# Patient Record
Sex: FEMALE | Race: WHITE | Employment: FULL TIME | URBAN - METROPOLITAN AREA
[De-identification: names, ages, dates, MRNs, and addresses within clinical notes are randomized per-mention and may not be internally consistent; named-entity substitution may affect disease eponyms.]

---

## 2017-04-12 LAB
GLUCOSE CHALLENGE: 77 MG/DL
HCT VFR BLD CALC: 38.5 % (ref 36–48)
HEMOGLOBIN: 12.8 G/DL (ref 12–16)
MCH RBC QN AUTO: 30.7 PG (ref 26–34)
MCHC RBC AUTO-ENTMCNC: 33.3 G/DL (ref 31–36)
MCV RBC AUTO: 92.2 FL (ref 80–100)
PDW BLD-RTO: 13.9 % (ref 12.4–15.4)
PLATELET # BLD: 204 K/UL (ref 135–450)
PMV BLD AUTO: 8.4 FL (ref 5–10.5)
RBC # BLD: 4.18 M/UL (ref 4–5.2)
WBC # BLD: 8.7 K/UL (ref 4–11)

## 2018-03-14 ENCOUNTER — OFFICE VISIT (OUTPATIENT)
Dept: DERMATOLOGY | Age: 43
End: 2018-03-14

## 2018-03-14 DIAGNOSIS — L30.9 DERMATITIS: Primary | ICD-10-CM

## 2018-03-14 PROCEDURE — 99202 OFFICE O/P NEW SF 15 MIN: CPT | Performed by: DERMATOLOGY

## 2018-03-14 RX ORDER — FLUOCINONIDE 1 MG/G
CREAM TOPICAL
Qty: 60 G | Refills: 0 | Status: SHIPPED | OUTPATIENT
Start: 2018-03-14 | End: 2019-11-15 | Stop reason: ALTCHOICE

## 2018-03-14 NOTE — PATIENT INSTRUCTIONS
DRY SKIN CARE    1. Do not take more than 1 shower or bath each day. Try to spend 10 minutes or less in the shower/bath. 2. Use a moisturizing soap such as Dove, Oil of Olay or Cetaphil. Antibacterial soaps can be too drying. 3. Use soap only on limited areas (face, underarms, groin). Try to avoid using soap on the arms, legs, trunk and back. 4. After showering, pat dry with a towel and generously apply a thick moisturizer all over. 5. If you are able, apply the moisturizer a second time during the day as well. 6. If a prescription cream or ointment has been ordered for you, apply the prescription medication to affected areas after your bath/shower while the skin is still damp, then apply the moisturizer as above. 7. When it comes to moisturizers, the thicker the better. Ointments (such as vaseline) are thicker than creams, and creams are thicker than lotions.  Suggested over-the-counter moisturizers include:    · CeraVe Cream  · Cetaphil Cream  · Lubriderm Cream  · Vaseline (petroleum jelly)  · Aquaphor  · Eucerin Cream  · Neutrogena Moisturizing Cream  · Neutrogena Hand Cream  · Aveeno Moisturizing Cream or Lotion  · Curel Cream

## 2018-03-14 NOTE — PROGRESS NOTES
[facility]) Dermatology  Sammy Cheatham Bita Bernal Luis  1975    38 y.o. female     Date of Visit: 3/14/2018    Chief Complaint / Reason for Referral: Rash     I was asked to see this patient by  No ref. provider found. History of Present Illness:  1. 9mo hx persistent mildly pruritic eruption of neck and extremities. No improvement w/ HC 1% crm, Cerave lotion.   -Showers daily w/ Dove all over. Intermittently moisturizes. -(+) personal hx allergic rhiniti s    Review of Systems:  Constitutional: Reports general sense of well-being. Heme: Denies abnormal bleeding/bruising. Past Medical History, Surgical History, Family History, Medications and Allergies reviewed. 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       Physical Examination     The following were examined and determined to be normal: Psych/Neuro, Head/face and Conjunctivae/eyelids. The following were examined and determined to be abnormal: Neck, RUE, LUE, RLE and LLE. -General: Well-appearing, NAD  1. Lateral neck, upper arms, anterior lower legs - ill-defined mildly finely scaling pink papules and plaques   -Arms and legs - severely xerotic     Assessment and Plan     1. Pruritic eruption of neck and extremities.  Morphology of eruption is consistent w/ dermatitis,

## 2018-05-11 ENCOUNTER — HOSPITAL ENCOUNTER (OUTPATIENT)
Dept: OTHER | Age: 43
Discharge: HOME | End: 2018-05-11
Attending: INTERNAL MEDICINE | Admitting: INTERNAL MEDICINE

## 2018-05-11 LAB
BASOPHILS ABSOLUTE: 0 K/UL (ref 0–0.2)
BASOPHILS RELATIVE PERCENT: 0.9 %
CHOLESTEROL, TOTAL: 167 MG/DL (ref 0–199)
EOSINOPHILS ABSOLUTE: 0.1 K/UL (ref 0–0.6)
EOSINOPHILS RELATIVE PERCENT: 3 %
HCT VFR BLD CALC: 40 % (ref 36–48)
HDLC SERPL-MCNC: 87 MG/DL (ref 40–60)
HEMOGLOBIN: 13.7 G/DL (ref 12–16)
LDL CHOLESTEROL CALCULATED: 71 MG/DL
LYMPHOCYTES ABSOLUTE: 1.7 K/UL (ref 1–5.1)
LYMPHOCYTES RELATIVE PERCENT: 33.1 %
MCH RBC QN AUTO: 30.1 PG (ref 26–34)
MCHC RBC AUTO-ENTMCNC: 34.2 G/DL (ref 31–36)
MCV RBC AUTO: 88.1 FL (ref 80–100)
MONOCYTES ABSOLUTE: 0.5 K/UL (ref 0–1.3)
MONOCYTES RELATIVE PERCENT: 10.2 %
NEUTROPHILS ABSOLUTE: 2.6 K/UL (ref 1.7–7.7)
NEUTROPHILS RELATIVE PERCENT: 52.8 %
PDW BLD-RTO: 13.2 % (ref 12.4–15.4)
PLATELET # BLD: 228 K/UL (ref 135–450)
PMV BLD AUTO: 8.5 FL (ref 5–10.5)
RBC # BLD: 4.54 M/UL (ref 4–5.2)
RHEUMATOID FACTOR: <10 IU/ML
SEDIMENTATION RATE, ERYTHROCYTE: 8 MM/HR (ref 0–20)
TRIGL SERPL-MCNC: 47 MG/DL (ref 0–150)
TSH SERPL DL<=0.05 MIU/L-ACNC: 1.01 UIU/ML (ref 0.27–4.2)
VITAMIN D 25-HYDROXY: 26 NG/ML
VLDLC SERPL CALC-MCNC: 9 MG/DL
WBC # BLD: 5 K/UL (ref 4–11)

## 2018-05-13 LAB
ANA INTERPRETATION: NORMAL
ANTI-NUCLEAR ANTIBODY (ANA): NEGATIVE

## 2018-05-15 LAB
A/G RATIO: 1.7 (ref 1.1–2.2)
ALBUMIN SERPL-MCNC: 4.6 G/DL (ref 3.4–5)
ALP BLD-CCNC: 67 U/L (ref 40–129)
ALT SERPL-CCNC: 15 U/L (ref 10–40)
ANION GAP SERPL CALCULATED.3IONS-SCNC: 23 MMOL/L (ref 3–16)
AST SERPL-CCNC: 20 U/L (ref 15–37)
BILIRUB SERPL-MCNC: 0.3 MG/DL (ref 0–1)
BUN BLDV-MCNC: 17 MG/DL (ref 7–20)
CALCIUM SERPL-MCNC: 9.1 MG/DL (ref 8.3–10.6)
CHLORIDE BLD-SCNC: 102 MMOL/L (ref 99–110)
CO2: 19 MMOL/L (ref 21–32)
CREAT SERPL-MCNC: 0.5 MG/DL (ref 0.6–1.1)
GFR AFRICAN AMERICAN: >60
GFR NON-AFRICAN AMERICAN: >60
GLOBULIN: 2.7 G/DL
GLUCOSE BLD-MCNC: 90 MG/DL (ref 70–99)
POTASSIUM SERPL-SCNC: 4.4 MMOL/L (ref 3.5–5.1)
SODIUM BLD-SCNC: 144 MMOL/L (ref 136–145)
TOTAL PROTEIN: 7.3 G/DL (ref 6.4–8.2)

## 2018-09-17 LAB
HPV COMMENT: NORMAL
HPV TYPE 16: NOT DETECTED
HPV TYPE 18: NOT DETECTED
HPVOH (OTHER TYPES): NOT DETECTED

## 2019-05-02 ENCOUNTER — HOSPITAL ENCOUNTER (OUTPATIENT)
Age: 44
Discharge: HOME | End: 2019-05-02
Payer: COMMERCIAL

## 2019-05-02 LAB
A/G RATIO: 1.5 (ref 1.1–2.2)
ALBUMIN SERPL-MCNC: 4.8 G/DL (ref 3.4–5)
ALP BLD-CCNC: 50 U/L (ref 40–129)
ALT SERPL-CCNC: 12 U/L (ref 10–40)
ANION GAP SERPL CALCULATED.3IONS-SCNC: 13 MMOL/L (ref 3–16)
AST SERPL-CCNC: 16 U/L (ref 15–37)
BASOPHILS ABSOLUTE: 0 K/UL (ref 0–0.2)
BASOPHILS RELATIVE PERCENT: 0.5 %
BILIRUB SERPL-MCNC: 0.8 MG/DL (ref 0–1)
BUN BLDV-MCNC: 13 MG/DL (ref 7–20)
CALCIUM SERPL-MCNC: 9.4 MG/DL (ref 8.3–10.6)
CHLORIDE BLD-SCNC: 102 MMOL/L (ref 99–110)
CHOLESTEROL, TOTAL: 165 MG/DL (ref 0–199)
CO2: 25 MMOL/L (ref 21–32)
CREAT SERPL-MCNC: 0.5 MG/DL (ref 0.6–1.1)
EOSINOPHILS ABSOLUTE: 0.1 K/UL (ref 0–0.6)
EOSINOPHILS RELATIVE PERCENT: 1.3 %
FOLATE: 13.29 NG/ML (ref 4.78–24.2)
GFR AFRICAN AMERICAN: >60
GFR NON-AFRICAN AMERICAN: >60
GLOBULIN: 3.1 G/DL
GLUCOSE BLD-MCNC: 93 MG/DL (ref 70–99)
HCT VFR BLD CALC: 43.3 % (ref 36–48)
HDLC SERPL-MCNC: 74 MG/DL (ref 40–60)
HEMOGLOBIN: 14.6 G/DL (ref 12–16)
LDL CHOLESTEROL CALCULATED: 79 MG/DL
LYMPHOCYTES ABSOLUTE: 1.3 K/UL (ref 1–5.1)
LYMPHOCYTES RELATIVE PERCENT: 25.3 %
MCH RBC QN AUTO: 29.5 PG (ref 26–34)
MCHC RBC AUTO-ENTMCNC: 33.8 G/DL (ref 31–36)
MCV RBC AUTO: 87.4 FL (ref 80–100)
MONOCYTES ABSOLUTE: 0.4 K/UL (ref 0–1.3)
MONOCYTES RELATIVE PERCENT: 7.9 %
NEUTROPHILS ABSOLUTE: 3.4 K/UL (ref 1.7–7.7)
NEUTROPHILS RELATIVE PERCENT: 65 %
PDW BLD-RTO: 12.9 % (ref 12.4–15.4)
PLATELET # BLD: 274 K/UL (ref 135–450)
PMV BLD AUTO: 8.6 FL (ref 5–10.5)
POTASSIUM SERPL-SCNC: 4.4 MMOL/L (ref 3.5–5.1)
RBC # BLD: 4.96 M/UL (ref 4–5.2)
SODIUM BLD-SCNC: 140 MMOL/L (ref 136–145)
T3 FREE: 2.8 PG/ML (ref 2.3–4.2)
T4 FREE: 1.3 NG/DL (ref 0.9–1.8)
TOTAL PROTEIN: 7.9 G/DL (ref 6.4–8.2)
TRIGL SERPL-MCNC: 62 MG/DL (ref 0–150)
TSH SERPL DL<=0.05 MIU/L-ACNC: 1.61 UIU/ML (ref 0.27–4.2)
VITAMIN B-12: 452 PG/ML (ref 211–911)
VITAMIN D 25-HYDROXY: 36.8 NG/ML
VLDLC SERPL CALC-MCNC: 12 MG/DL
WBC # BLD: 5.3 K/UL (ref 4–11)

## 2019-05-02 PROCEDURE — 80061 LIPID PANEL: CPT

## 2019-05-02 PROCEDURE — 84439 ASSAY OF FREE THYROXINE: CPT

## 2019-05-02 PROCEDURE — 84443 ASSAY THYROID STIM HORMONE: CPT

## 2019-05-02 PROCEDURE — 36415 COLL VENOUS BLD VENIPUNCTURE: CPT

## 2019-05-02 PROCEDURE — 82746 ASSAY OF FOLIC ACID SERUM: CPT

## 2019-05-02 PROCEDURE — 82607 VITAMIN B-12: CPT

## 2019-05-02 PROCEDURE — 84481 FREE ASSAY (FT-3): CPT

## 2019-05-02 PROCEDURE — 82306 VITAMIN D 25 HYDROXY: CPT

## 2019-05-02 PROCEDURE — 80053 COMPREHEN METABOLIC PANEL: CPT

## 2019-05-02 PROCEDURE — 85025 COMPLETE CBC W/AUTO DIFF WBC: CPT

## 2019-10-01 ENCOUNTER — HOSPITAL ENCOUNTER (OUTPATIENT)
Dept: CT IMAGING | Age: 44
Discharge: HOME | End: 2019-10-01
Payer: COMMERCIAL

## 2019-10-01 DIAGNOSIS — R10.32 LEFT LOWER QUADRANT PAIN: ICD-10-CM

## 2019-10-01 PROCEDURE — 74177 CT ABD & PELVIS W/CONTRAST: CPT

## 2019-10-01 PROCEDURE — 6360000004 HC RX CONTRAST MEDICATION: Performed by: INTERNAL MEDICINE

## 2019-10-01 RX ADMIN — IOPAMIDOL 75 ML: 755 INJECTION, SOLUTION INTRAVENOUS at 12:08

## 2019-10-01 RX ADMIN — IOHEXOL 50 ML: 240 INJECTION, SOLUTION INTRATHECAL; INTRAVASCULAR; INTRAVENOUS; ORAL at 12:07

## 2019-10-04 ENCOUNTER — HOSPITAL ENCOUNTER (OUTPATIENT)
Dept: WOMENS IMAGING | Age: 44
Discharge: HOME | End: 2019-10-04
Payer: COMMERCIAL

## 2019-10-04 DIAGNOSIS — Z12.31 VISIT FOR SCREENING MAMMOGRAM: ICD-10-CM

## 2019-10-04 PROCEDURE — 77067 SCR MAMMO BI INCL CAD: CPT

## 2019-11-15 ENCOUNTER — ANESTHESIA EVENT (OUTPATIENT)
Dept: ENDOSCOPY | Age: 44
End: 2019-11-15
Payer: COMMERCIAL

## 2019-11-15 RX ORDER — OMEPRAZOLE 20 MG/1
20 CAPSULE, DELAYED RELEASE ORAL DAILY
COMMUNITY

## 2019-11-26 ENCOUNTER — HOSPITAL ENCOUNTER (OUTPATIENT)
Age: 44
Setting detail: OUTPATIENT SURGERY
Discharge: HOME | End: 2019-11-26
Attending: INTERNAL MEDICINE | Admitting: INTERNAL MEDICINE
Payer: COMMERCIAL

## 2019-11-26 ENCOUNTER — ANESTHESIA (OUTPATIENT)
Dept: ENDOSCOPY | Age: 44
End: 2019-11-26
Payer: COMMERCIAL

## 2019-11-26 VITALS
WEIGHT: 145 LBS | OXYGEN SATURATION: 100 % | TEMPERATURE: 97.5 F | BODY MASS INDEX: 25.69 KG/M2 | DIASTOLIC BLOOD PRESSURE: 67 MMHG | HEIGHT: 63 IN | RESPIRATION RATE: 16 BRPM | SYSTOLIC BLOOD PRESSURE: 113 MMHG | HEART RATE: 64 BPM

## 2019-11-26 VITALS
RESPIRATION RATE: 19 BRPM | DIASTOLIC BLOOD PRESSURE: 63 MMHG | OXYGEN SATURATION: 100 % | SYSTOLIC BLOOD PRESSURE: 102 MMHG

## 2019-11-26 LAB — HCG(URINE) PREGNANCY TEST: NEGATIVE

## 2019-11-26 PROCEDURE — 88305 TISSUE EXAM BY PATHOLOGIST: CPT

## 2019-11-26 PROCEDURE — 6370000000 HC RX 637 (ALT 250 FOR IP): Performed by: INTERNAL MEDICINE

## 2019-11-26 PROCEDURE — 3609012400 HC EGD TRANSORAL BIOPSY SINGLE/MULTIPLE: Performed by: INTERNAL MEDICINE

## 2019-11-26 PROCEDURE — 2709999900 HC NON-CHARGEABLE SUPPLY: Performed by: INTERNAL MEDICINE

## 2019-11-26 PROCEDURE — 7100000010 HC PHASE II RECOVERY - FIRST 15 MIN: Performed by: INTERNAL MEDICINE

## 2019-11-26 PROCEDURE — 3700000001 HC ADD 15 MINUTES (ANESTHESIA): Performed by: INTERNAL MEDICINE

## 2019-11-26 PROCEDURE — 7100000011 HC PHASE II RECOVERY - ADDTL 15 MIN: Performed by: INTERNAL MEDICINE

## 2019-11-26 PROCEDURE — 84703 CHORIONIC GONADOTROPIN ASSAY: CPT

## 2019-11-26 PROCEDURE — 3700000000 HC ANESTHESIA ATTENDED CARE: Performed by: INTERNAL MEDICINE

## 2019-11-26 PROCEDURE — 6360000002 HC RX W HCPCS: Performed by: NURSE ANESTHETIST, CERTIFIED REGISTERED

## 2019-11-26 PROCEDURE — 2500000003 HC RX 250 WO HCPCS: Performed by: NURSE ANESTHETIST, CERTIFIED REGISTERED

## 2019-11-26 PROCEDURE — 2580000003 HC RX 258: Performed by: ANESTHESIOLOGY

## 2019-11-26 PROCEDURE — 3609010600 HC COLONOSCOPY POLYPECTOMY SNARE/COLD BIOPSY: Performed by: INTERNAL MEDICINE

## 2019-11-26 RX ORDER — SODIUM CHLORIDE 9 MG/ML
INJECTION, SOLUTION INTRAVENOUS CONTINUOUS
Status: DISCONTINUED | OUTPATIENT
Start: 2019-11-26 | End: 2019-11-26 | Stop reason: HOSPADM

## 2019-11-26 RX ORDER — SODIUM CHLORIDE 0.9 % (FLUSH) 0.9 %
10 SYRINGE (ML) INJECTION PRN
Status: DISCONTINUED | OUTPATIENT
Start: 2019-11-26 | End: 2019-11-26 | Stop reason: HOSPADM

## 2019-11-26 RX ORDER — PROPOFOL 10 MG/ML
INJECTION, EMULSION INTRAVENOUS PRN
Status: DISCONTINUED | OUTPATIENT
Start: 2019-11-26 | End: 2019-11-26 | Stop reason: SDUPTHER

## 2019-11-26 RX ORDER — GLYCOPYRROLATE 0.2 MG/ML
INJECTION INTRAMUSCULAR; INTRAVENOUS PRN
Status: DISCONTINUED | OUTPATIENT
Start: 2019-11-26 | End: 2019-11-26 | Stop reason: SDUPTHER

## 2019-11-26 RX ORDER — LIDOCAINE HYDROCHLORIDE 20 MG/ML
INJECTION, SOLUTION INFILTRATION; PERINEURAL PRN
Status: DISCONTINUED | OUTPATIENT
Start: 2019-11-26 | End: 2019-11-26 | Stop reason: SDUPTHER

## 2019-11-26 RX ORDER — SODIUM CHLORIDE 0.9 % (FLUSH) 0.9 %
10 SYRINGE (ML) INJECTION EVERY 12 HOURS SCHEDULED
Status: DISCONTINUED | OUTPATIENT
Start: 2019-11-26 | End: 2019-11-26 | Stop reason: HOSPADM

## 2019-11-26 RX ADMIN — GLYCOPYRROLATE 0.2 MG: 0.2 INJECTION, SOLUTION INTRAMUSCULAR; INTRAVENOUS at 10:20

## 2019-11-26 RX ADMIN — PROPOFOL 100 MG: 10 INJECTION, EMULSION INTRAVENOUS at 10:24

## 2019-11-26 RX ADMIN — PROPOFOL 100 MG: 10 INJECTION, EMULSION INTRAVENOUS at 10:20

## 2019-11-26 RX ADMIN — PROPOFOL 30 MG: 10 INJECTION, EMULSION INTRAVENOUS at 10:38

## 2019-11-26 RX ADMIN — LIDOCAINE HYDROCHLORIDE 100 MG: 20 INJECTION, SOLUTION INFILTRATION; PERINEURAL at 10:20

## 2019-11-26 RX ADMIN — PROPOFOL 100 MG: 10 INJECTION, EMULSION INTRAVENOUS at 10:29

## 2019-11-26 RX ADMIN — SODIUM CHLORIDE: 9 INJECTION, SOLUTION INTRAVENOUS at 10:04

## 2019-11-26 RX ADMIN — PROPOFOL 30 MG: 10 INJECTION, EMULSION INTRAVENOUS at 10:43

## 2019-11-26 ASSESSMENT — PAIN SCALES - GENERAL
PAINLEVEL_OUTOF10: 0

## 2019-11-26 ASSESSMENT — ENCOUNTER SYMPTOMS: SHORTNESS OF BREATH: 0

## 2019-11-26 ASSESSMENT — PAIN - FUNCTIONAL ASSESSMENT: PAIN_FUNCTIONAL_ASSESSMENT: 0-10

## 2020-05-01 ENCOUNTER — OFFICE VISIT (OUTPATIENT)
Dept: ORTHOPEDIC SURGERY | Age: 45
End: 2020-05-01
Payer: COMMERCIAL

## 2020-05-01 VITALS — WEIGHT: 144 LBS | BODY MASS INDEX: 25.52 KG/M2 | HEIGHT: 63 IN | TEMPERATURE: 99.1 F

## 2020-05-01 PROCEDURE — 99202 OFFICE O/P NEW SF 15 MIN: CPT | Performed by: ORTHOPAEDIC SURGERY

## 2020-05-07 ENCOUNTER — HOSPITAL ENCOUNTER (OUTPATIENT)
Dept: MRI IMAGING | Age: 45
Discharge: HOME | End: 2020-05-07
Payer: COMMERCIAL

## 2020-05-07 PROCEDURE — 73721 MRI JNT OF LWR EXTRE W/O DYE: CPT

## 2020-05-08 ENCOUNTER — TELEPHONE (OUTPATIENT)
Dept: ORTHOPEDIC SURGERY | Age: 45
End: 2020-05-08

## 2020-05-08 ENCOUNTER — HOSPITAL ENCOUNTER (EMERGENCY)
Age: 45
Discharge: HOME | End: 2020-05-08
Payer: COMMERCIAL

## 2020-05-08 ENCOUNTER — NURSE TRIAGE (OUTPATIENT)
Dept: OTHER | Facility: CLINIC | Age: 45
End: 2020-05-08

## 2020-05-08 VITALS
TEMPERATURE: 98.2 F | HEART RATE: 69 BPM | HEIGHT: 63 IN | BODY MASS INDEX: 25.69 KG/M2 | WEIGHT: 145 LBS | DIASTOLIC BLOOD PRESSURE: 77 MMHG | SYSTOLIC BLOOD PRESSURE: 121 MMHG | RESPIRATION RATE: 19 BRPM | OXYGEN SATURATION: 97 %

## 2020-05-08 PROCEDURE — 99282 EMERGENCY DEPT VISIT SF MDM: CPT

## 2020-05-08 RX ORDER — IBUPROFEN 600 MG/1
600 TABLET ORAL EVERY 6 HOURS PRN
Qty: 30 TABLET | Refills: 0 | Status: ON HOLD | OUTPATIENT
Start: 2020-05-08 | End: 2020-06-04 | Stop reason: HOSPADM

## 2020-05-08 RX ORDER — HYDROCODONE BITARTRATE AND ACETAMINOPHEN 5; 325 MG/1; MG/1
1 TABLET ORAL EVERY 6 HOURS PRN
Qty: 10 TABLET | Refills: 0 | Status: SHIPPED | OUTPATIENT
Start: 2020-05-08 | End: 2020-05-11

## 2020-05-08 ASSESSMENT — ENCOUNTER SYMPTOMS
DIARRHEA: 0
VOMITING: 0
NAUSEA: 0
COUGH: 0
RHINORRHEA: 0
SHORTNESS OF BREATH: 0
WHEEZING: 0
ABDOMINAL PAIN: 0

## 2020-05-08 ASSESSMENT — PAIN SCALES - GENERAL: PAINLEVEL_OUTOF10: 10

## 2020-05-08 ASSESSMENT — PAIN DESCRIPTION - LOCATION: LOCATION: KNEE

## 2020-05-08 NOTE — ED PROVIDER NOTES
[facility]        Pt Name: Zabrina Daly  MRN: [id number]  [place] 1975  Date of evaluation: 5/8/2020  Provider: Areli Fuentes PA-C  PCP: Shivani Castro MD    Evaluation by VINNIE. My supervising physician was available for consultation. CHIEF COMPLAINT       Chief Complaint   Patient presents with    Fall     Pt to ED c/o pain to left knee. Pt states she injured it three weeks ago and has had a MRI. Pt reports falling yesterday on stairs and reinjured it. CMS intact to left lower extremity.  Knee Pain       HISTORY OF PRESENT ILLNESS   (Location, Timing/Onset, Context/Setting, Quality, Duration, Modifying Factors, Severity, Associated Signs and Symptoms)  Note limiting factors. Zabrina Daly is a 39 y.o. female with recent left knee injury that initially occurred 3 weeks ago presents for evaluation of new injury that occurred yesterday. Patient states that she did have an MRI performed yesterday did show a complete ACL tear. States that she was trying to walk down the stairs yesterday, missed a step and twisted the knee, landing on her right ankle. She states that she did not hit the knee or land on the knee. She states that she has since had increasing pain and swelling. She denies numbness tingling or weakness distally. Range of motion continues to be to be decreased secondary to pain and swelling. She tried to get a hold of her orthopedist, Dr. Joycelyn Martino, however, he was in the OR. She came here for further evaluation management of symptoms. No other complaints or concerns at this time. Nursing Notes were all reviewed and agreed with or any disagreements were addressed in the HPI. REVIEW OF SYSTEMS    (2-9 systems for level 4, 10 or more for level 5)     Review of Systems   Constitutional: Negative for appetite change, chills and fever. HENT: Negative for congestion and rhinorrhea. Topics    Alcohol use: Yes     Comment: socially    Drug use: No       SCREENINGS             PHYSICAL EXAM    (up to 7 for level 4, 8 or more for level 5)     ED Triage Vitals [05/08/20 1709]   BP Temp Temp Source Pulse Resp SpO2 Height Weight   121/77 98.2 °F (36.8 °C) Oral 69 19 97 % 5' 3\" (1.6 m) 145 lb (65.8 kg)       Physical Exam  Vitals signs and nursing note reviewed. Constitutional:       Appearance: She is well-developed. She is not diaphoretic. HENT:      Head: Normocephalic and atraumatic. Right Ear: External ear normal.      Left Ear: External ear normal.      Nose: Nose normal.   Eyes:      General:         Right eye: No discharge. Left eye: No discharge. Neck:      Musculoskeletal: Normal range of motion and neck supple. Cardiovascular:      Pulses: Normal pulses. Pulmonary:      Effort: Pulmonary effort is normal. No respiratory distress. Musculoskeletal:      Left knee: She exhibits decreased range of motion, swelling and effusion. She exhibits no deformity and normal patellar mobility. Tenderness found. Skin:     General: Skin is warm and dry. Neurological:      Mental Status: She is alert and oriented to person, place, and time. Sensory: Sensation is intact. Motor: Motor function is intact. Psychiatric:         Behavior: Behavior normal.         DIAGNOSTIC RESULTS   LABS:    Labs Reviewed - No data to display    All other labs were within normal range or not returned as of this dictation. EKG: All EKG's are interpreted by the Emergency Department Physician in the absence of a cardiologist.  Please see their note for interpretation of EKG.       RADIOLOGY:   Non-plain film images such as CT, Ultrasound and MRI are read by the radiologist. Plain radiographic images are visualized and preliminarily interpreted by the ED Provider with the below findings:        Interpretation per the Radiologist below, if available at the time of this note:    No orders to N/A    CONSULTS:  None      EMERGENCY DEPARTMENT COURSE and DIFFERENTIAL DIAGNOSIS/MDM:   Vitals:    Vitals:    05/08/20 1709   BP: 121/77   Pulse: 69   Resp: 19   Temp: 98.2 °F (36.8 °C)   TempSrc: Oral   SpO2: 97%   Weight: 145 lb (65.8 kg)   Height: 5' 3\" (1.6 m)       Patient was given the following medications:  Medications - No data to display    Patient presents for evaluation of left knee pain and swelling. On exam, she is resting comfortably in bed no acute distress nontoxic. Vitals are stable and she is afebrile. She does have exquisite tenderness with obvious effusion to the left knee. There is no bony tenderness step-offs crepitus obvious deformity or dislocation. Patella is intact. Mild ligamentous laxity noted with anterior drawer test.  She is neurovascularly intact distally. Range of motion decreased secondary to pain and swelling. MRI from yesterday does show complete ACL tear but there is no osseous injury. Due to lack of any reproducible bony tenderness and mechanism of injury do not lose any indication for repeat imaging and patient is in agreement with this. She is provided with Ace wrap, knee immobilizer and crutches as well as Motrin and Norco for additional pain relief and encouraged to follow-up with Dr. Jamee Jasso next week. I estimate there is LOW risk for COMPARTMENT SYNDROME, DEEP VENOUS THROMBOSIS, SEPTIC ARTHRITIS, TENDON OR NEUROVASCULAR INJURY, thus I consider the discharge disposition reasonable. Conditions for return to the ED were also discussed such as any new or worsening symptoms or signs of neurovascular compromise, intractable pain or infection. She is agreeable with plan stable for discharge at this time. FINAL IMPRESSION      1.  Rupture of anterior cruciate ligament of left knee, initial encounter          DISPOSITION/PLAN   DISPOSITION Decision To Discharge 05/08/2020 06:17:00 PM      PATIENT REFERREDTO:  Lilia Nava MD  [facility] [address]

## 2020-05-11 ENCOUNTER — OFFICE VISIT (OUTPATIENT)
Dept: ORTHOPEDIC SURGERY | Age: 45
End: 2020-05-11
Payer: COMMERCIAL

## 2020-05-11 VITALS — BODY MASS INDEX: 25.69 KG/M2 | HEIGHT: 63 IN | TEMPERATURE: 98.8 F | WEIGHT: 145 LBS

## 2020-05-11 PROCEDURE — L1812 KO ELASTIC W/JOINTS PRE OTS: HCPCS | Performed by: ORTHOPAEDIC SURGERY

## 2020-05-11 PROCEDURE — 99213 OFFICE O/P EST LOW 20 MIN: CPT | Performed by: ORTHOPAEDIC SURGERY

## 2020-05-11 NOTE — LETTER
[address]  Phone: [phone]  Fax: [phone]    Bassem Bailey MD        May 11, 2020     Patient: Paige De La Rosa   YOB: 1975   Date of Visit: 5/11/2020       To Whom It May Concern: It is my medical opinion that Ileana Melchor may return to work on 8/11/20. If you have any questions or concerns, please don't hesitate to call.     Sincerely,          Bassem Bailey MD

## 2020-05-13 ENCOUNTER — HOSPITAL ENCOUNTER (OUTPATIENT)
Dept: PHYSICAL THERAPY | Age: 45
Setting detail: THERAPIES SERIES
Discharge: HOME | End: 2020-05-13
Payer: COMMERCIAL

## 2020-05-13 PROCEDURE — 97161 PT EVAL LOW COMPLEX 20 MIN: CPT | Performed by: PHYSICAL THERAPIST

## 2020-05-13 PROCEDURE — 97016 VASOPNEUMATIC DEVICE THERAPY: CPT | Performed by: PHYSICAL THERAPIST

## 2020-05-13 PROCEDURE — 97110 THERAPEUTIC EXERCISES: CPT | Performed by: PHYSICAL THERAPIST

## 2020-05-13 NOTE — FLOWSHEET NOTE
[facility]  Phone: [phone]   Fax: [phone]    Physical Therapy Treatment Note/ Progress Report:     Date:  2020    Patient Name:  Joana Monsivais    :  1975  MRN: [id number]  Restrictions/Precautions:    Medical/Treatment Diagnosis Information:  Diagnosis: S83.512 (ICD-10-CM) - Anterior cruciate ligament complete tear, left  Treatment Diagnosis: L knee pain; L knee stiffness; L LE atrophy; L LE swelling; difficulty walking  Insurance/Certification information:  PT Insurance Information: Med [place] - visits per medical necessity; no auth  Physician Information:  Referring Practitioner: Dr. Andrew Tang of care signed (Y/N): []  Yes [x]  No     Date of Patient follow up with Physician:      Progress Report: []  Yes  [x]  No     Date Range for reporting period:  Beginnin2020  Ending:     Progress report due (10 Rx/or 30 days whichever is less): visit #10 or      Recertification due (POC duration/ or 90 days whichever is less): visit #8 or 2020    Visit # Insurance Allowable Auth required? Date Range   1 Medical necessity []  Yes  [x]  No n/a     Latex Allergy:  [x]NO      []YES  Preferred Language for Healthcare:   [x]English       []other:    Functional Scale:        Date assessed:  LEFS: raw score = 9/80; dysfunction = 89%  2020    Pain level:  7-10/10     SUBJECTIVE:  See eval    OBJECTIVE: See eval      RESTRICTIONS/PRECAUTIONS: ACL tear. NWB w/ B axillary crutches.      Exercises/Interventions:     Therapeutic Exercise (96859)  Resistance / level Sets/sec Reps Notes / Cues   Calf stretch - seated w/ towel pull   30\"  5 Small towel roll under knee for support   Hamstring stretch - seate  30\" 5 Small towel roll under knee for support   Knee flexion PROM - EOB  10\" 10    Knee flexion PROM - heel slide w/ strap  10\" 10           Hip addcutor ball squeeze    Next visit   SAQ    Next visit   SLR supine in constant attendance of 2 or more patients providing skilled therapy interventions, but not providing any significant amount of measurable one-on-one time to either patient, for improvements in LE, proximal hip, and core control in self care, mobility, lifting, ambulation and eccentric single leg control. NMR and Therapeutic Activities:    [x] (80796 or 84547) Provided verbal/tactile cueing for activities related to improving balance, coordination, kinesthetic sense, posture, motor skill, proprioception and motor activation to allow for proper function of core, proximal hip and LE with self care and ADLs  [] (84570) Gait Re-education- Provided training and instruction to the patient for proper LE, core and proximal hip recruitment and positioning and eccentric body weight control with ambulation re-education including up and down stairs     Home Exercise Program:    [x] (57119) Reviewed/Progressed HEP activities related to strengthening, flexibility, endurance, ROM of core, proximal hip and LE for functional self-care, mobility, lifting and ambulation/stair navigation   [] (02676)Reviewed/Progressed HEP activities related to improving balance, coordination, kinesthetic sense, posture, motor skill, proprioception of core, proximal hip and LE for self care, mobility, lifting, and ambulation/stair navigation      Manual Treatments:  PROM / STM / Oscillations-Mobs:  G-I, II, III, IV (PA's, Inf., Post.)  [x] (41405) Provided manual therapy to mobilize LE, proximal hip and/or LS spine soft tissue/joints for the purpose of modulating pain, promoting relaxation,  increasing ROM, reducing/eliminating soft tissue swelling/inflammation/restriction, improving soft tissue extensibility and allowing for proper ROM for normal function with self care, mobility, lifting and ambulation.      Modalities:  [] (78099) Vasopneumatic compression: Utilized vasopneumatic compression to decrease edema / swelling for the purpose of

## 2020-05-13 NOTE — PLAN OF CARE
[place], [facility], [address]  Phone: [phone]   Fax: [phone]                                                       Physical Therapy Certification    Dear Referring Practitioner: Dr. Raoul Ho,    We had the pleasure of evaluating the following patient for physical therapy services at [address] [place] [address]. A summary of our findings can be found in the initial assessment below. This includes our plan of care. If you have any questions or concerns regarding these findings, please do not hesitate to contact me at the office phone number checked above. Thank you for the referral.       Physician Signature:_______________________________Date:__________________  By signing above (or electronic signature), therapists plan is approved by physician      Patient: Sriram White   : 1975   MRN: [id number]  Referring Physician: Referring Practitioner: Dr. Raoul Ho      Evaluation Date: 2020      Medical Diagnosis Information:  Diagnosis: D86.050 (ICD-10-CM) - Anterior cruciate ligament complete tear, left   Treatment Diagnosis: L knee pain; L knee stiffness; L LE atrophy; L LE swelling; difficulty walking                                         Insurance information: PT Insurance Information: Med [place] - visits per medical necessity; no auth     Precautions/ Contra-indications: ACL tear  Latex Allergy:  [x]NO      []YES  Preferred Language for Healthcare:   [x]English       []other:    SUBJECTIVE: Patient stated complaint: L knee original injury - was trying to jump over something and felt it pop about a month ago. Last Thursday, had MRI and later that evening fell down her back steps resulting in increased pain. Went to ER  - was given immobilizer, crutches and pain meds.  Returned to Dr. Raoul Ho on  and was given smaller hinged

## 2020-05-14 PROBLEM — S83.512A ANTERIOR CRUCIATE LIGAMENT COMPLETE TEAR, LEFT, INITIAL ENCOUNTER: Status: ACTIVE | Noted: 2020-05-14

## 2020-05-15 ENCOUNTER — TELEPHONE (OUTPATIENT)
Dept: ORTHOPEDIC SURGERY | Age: 45
End: 2020-05-15

## 2020-05-15 NOTE — TELEPHONE ENCOUNTER
PATIENT WOULD LIKE A CALL BACK REGARDING HER [facility] PAPERWORK. PATIENT WOULD LIKE TO COME IN THE OFFICE TODAY AND GET HER PAPERWORK FILLED OUT.  [place] [phone]

## 2020-05-19 ENCOUNTER — TELEPHONE (OUTPATIENT)
Dept: ORTHOPEDIC SURGERY | Age: 45
End: 2020-05-19

## 2020-05-19 NOTE — TELEPHONE ENCOUNTER
Pt called req her disability forms be re-faxed. Pt stated last page was not received. Please advise.        Call [phone]    Toni

## 2020-05-20 ENCOUNTER — TELEPHONE (OUTPATIENT)
Dept: ORTHOPEDIC SURGERY | Age: 45
End: 2020-05-20

## 2020-05-20 ENCOUNTER — HOSPITAL ENCOUNTER (OUTPATIENT)
Dept: PHYSICAL THERAPY | Age: 45
Setting detail: THERAPIES SERIES
Discharge: HOME | End: 2020-05-20
Payer: COMMERCIAL

## 2020-05-20 PROCEDURE — 97112 NEUROMUSCULAR REEDUCATION: CPT

## 2020-05-20 PROCEDURE — 97110 THERAPEUTIC EXERCISES: CPT

## 2020-05-20 NOTE — FLOWSHEET NOTE
[facility]  Phone: [phone]   Fax: [phone]    Physical Therapy Treatment Note/ Progress Report:     Date:  2020    Patient Name:  Hero Haile    :  1975  MRN: [id number]  Restrictions/Precautions:    Medical/Treatment Diagnosis Information:  Diagnosis: S83.512 (ICD-10-CM) - Anterior cruciate ligament complete tear, left  Treatment Diagnosis: L knee pain; L knee stiffness; L LE atrophy; L LE swelling; difficulty walking  Insurance/Certification information:  PT Insurance Information: Med [place] - visits per medical necessity; no auth  Physician Information:  Referring Practitioner: Dr. Dianne Orta of care signed (Y/N): []  Yes [x]  No     Date of Patient follow up with Physician:      Progress Report: []  Yes  [x]  No     Date Range for reporting period:  Beginnin2020  Ending:     Progress report due (10 Rx/or 30 days whichever is less): visit #10 or      Recertification due (POC duration/ or 90 days whichever is less): visit #8 or 2020    Visit # Insurance Allowable Auth required? Date Range   2 Medical necessity []  Yes  [x]  No n/a     Latex Allergy:  [x]NO      []YES  Preferred Language for Healthcare:   [x]English       []other:    Functional Scale:        Date assessed:  LEFS: raw score = 9/80; dysfunction = 89%  2020    Pain level:  3/10     SUBJECTIVE:  Pt reports she has been feeling better since her eval. Having pain in bed with rolling and moving around. The exercises went well at home. OBJECTIVE:   : pt arrives to department with knee brace (requested to remove for mat therex with exception of side lying hip abd) and leggings (requested she wear or bring shorts to next visit); L knee AROM ext: 8 deg from neutral, L knee AROM (seated at EOB): 90 deg,  quad set (with towel roll under knee): fair to good      RESTRICTIONS/PRECAUTIONS: ACL tear. NWB w/ B axillary crutches. LE for functional self-care, mobility, lifting and ambulation/stair navigation   [] (15884)Reviewed/Progressed HEP activities related to improving balance, coordination, kinesthetic sense, posture, motor skill, proprioception of core, proximal hip and LE for self care, mobility, lifting, and ambulation/stair navigation      Manual Treatments:  PROM / STM / Oscillations-Mobs:  G-I, II, III, IV (PA's, Inf., Post.)  [x] (38516) Provided manual therapy to mobilize LE, proximal hip and/or LS spine soft tissue/joints for the purpose of modulating pain, promoting relaxation,  increasing ROM, reducing/eliminating soft tissue swelling/inflammation/restriction, improving soft tissue extensibility and allowing for proper ROM for normal function with self care, mobility, lifting and ambulation. Modalities:  [] (43455) Vasopneumatic compression: Utilized vasopneumatic compression to decrease edema / swelling for the purpose of improving mobility and quad tone / recruitment which will allow for increased overall function including but not limited to self-care, transfers, ambulation, and ascending / descending stairs. Modalities:  Vaso to L knee x 10' (consider knee and ankle next visit). Charges:  Timed Code Treatment Minutes: 55   Total Treatment Minutes: 55     [] EVAL - LOW (46422)   [] EVAL - MOD (48888)  [] EVAL - HIGH (22213)  [] RE-EVAL (58762)  [x] (11668) x 3      [] Ionto  [x] NMR (74641) x 1      [x] Vaso  [] Manual (00593) x       [] Ultrasound  [] TA x        [] Mech Traction (63706)  [] Aquatic Therapy x     [] ES (un) (22827):   [] Home Management Training x  [] ES(attended) (89804)   [] Dry Needling 1-2 muscles (89626):  [] Dry Needling 3+ muscles (008587  [] Group:      [] Other:     GOALS:  Patient stated goal: improve ROM and return to normal   []? Progressing: []? Met: []? Not Met: []? Adjusted     Therapist goals for Patient:   Short Term Goals: To be achieved in: 2 weeks  1.  Independent in

## 2020-05-22 ENCOUNTER — HOSPITAL ENCOUNTER (OUTPATIENT)
Dept: PHYSICAL THERAPY | Age: 45
Setting detail: THERAPIES SERIES
Discharge: HOME | End: 2020-05-22
Payer: COMMERCIAL

## 2020-05-22 PROCEDURE — 97110 THERAPEUTIC EXERCISES: CPT

## 2020-05-22 PROCEDURE — 97112 NEUROMUSCULAR REEDUCATION: CPT

## 2020-05-22 NOTE — FLOWSHEET NOTE
NMES      RESTRICTIONS/PRECAUTIONS: ACL tear. NWB w/ B axillary crutches. Exercises/Interventions:     Therapeutic Exercise (98455)  Resistance / level Sets/sec Reps Notes / Cues   Nu Step    Added 5/20   Calf stretch - seated w/ towel pull   30\"  5 Small towel roll under knee for support   Hamstring stretch - seated  Small towel roll under knee for support   Knee flexion PROM - EOB  10\" 10    Knee flexion PROM - heel slide w/ strap  10\" 10    Glute sets  10\" 10 Added 5/20   Hip addcutor ball squeeze  10\" 10 Added 5/20 pt in long sitting   SAQ  2/3\" 10 Added 5/20   SAQ to SLR supine  1 10 Added 5/20   SLR abduction  2 10 Added 5/20   Long sitting hamstring stretch  30\" 5 Added 5/20   SLR npv? Therapeutic Activities (74272)       Gait training with axillary crutches npv? Neuromuscular Re-ed (23310)       VMS Burst w/ quad sets  8 min  Added 5/22  50% duty cycle  10/20 cycle time  2 sec ramp  50 bps  No towel roll   Quad Set Into towel roll  1/10\"  10                  Manual Intervention (61572)       Patella Mobs - sup / inf and med / lat Gr II 3'                        Pt. Education:  -pt educated on diagnosis, prognosis and expectations for rehab  -discussed ACL reconstruction surgery as well as recovery and expectations.   -all pt questions were answered    Home Exercise Program:  Access Code: [email or web address]   URL: [email or web address]   Date: 05/13/2020   Prepared by: Abdi Argueta     Program Notes   Ice: 15 - 20 minutes at least 3-4 times per day. Okay to ice more (up to 20 minutes per hour). Elevate when possible.      Exercises   Long Sitting Ankle Pumps - 30 reps - 1 sets - 3x daily - 21x weekly   Long Sitting Calf Stretch with Strap - 5 reps - 1 sets - 30 second hold - 2x daily - 14x weekly   Seated Table Hamstring Stretch - 5 reps - 1 sets - 30 seconds hold - 2x daily - 14x weekly   Long Sitting Quad Set - 20 reps - 1 sets - 5 second hold - 2x daily - Met: []? Not Met: []? Adjusted     Therapist goals for Patient:   Short Term Goals: To be achieved in: 2 weeks  1. Independent in HEP and progression per patient tolerance, in order to prevent re-injury. []? Progressing: []? Met: []? Not Met: []? Adjusted  2. Patient will have a decrease in pain to facilitate improvement in movement, function, and ADLs as indicated by Functional Deficits. []? Progressing: []? Met: []? Not Met: []? Adjusted     Long Term Goals: To be achieved in: 4-6 weeks  1. Disability index score of 50% or less for the LEFS to assist with reaching prior level of function. []? Progressing: []? Met: []? Not Met: []? Adjusted  2. Patient will demonstrate increased AROM to at least 0-120 to allow for proper joint functioning as indicated by patients Functional Deficits. []? Progressing: []? Met: []? Not Met: []? Adjusted  3. Patient will demonstrate an increase in Strength to at least good as well as good proximal hip strength and control to allow for proper functional mobility as indicated by patients Functional Deficits. []? Progressing: []? Met: []? Not Met: []? Adjusted  4. Patient will return to functional activities including walking w/o assistive device without increased symptoms or restriction. []? Progressing: []? Met: []? Not Met: []? Adjusted  5. To be determined post-op. []? Progressing: []? Met: []? Not Met: []? Adjusted         Overall Progression Towards Functional goals/ Treatment Progress Update:  [] Patient is progressing as expected towards functional goals listed. [] Progression is slowed due to complexities/Impairments listed. [] Progression has been slowed due to co-morbidities.   [x] Plan just implemented, too soon to assess goals progression <30days   [] Goals require adjustment due to lack of progress  [] Patient is not progressing as expected and requires additional follow up with physician  [] Other    Persisting Functional Limitations/Impairments:  [x]Sitting [x]Standing   [x]Walking [x]Stairs   [x]Transfers [x]ADLs   [x]Squatting/bending [x]Kneeling  [x]Housework [x]Job related tasks  []Driving [x]Sports/Recreation   [x]Sleeping []Other:    ASSESSMENT:  Pt presents to clinic ambulating with axillary crutches, NWB through L LE secondary to pain/buckling. Exercise progression in bold above. Pt is able to tolerate new exercise with no increase in symptoms. Pt presents with improved extension and flexion ROM this date. Also presents with improved quad set with towel roll under knee. Unable to initiate quad set without towel roll, but improved with NMES this date. Continue to progress LE strengthening as able to tolerate. Consider gait training next visit if pt is able to tolerate partial WB. Treatment/Activity Tolerance:  [x] Pt able to complete treatment [] Patient limited by fatique  [] Patient limited by pain  [] Patient limited by other medical complications  [] Other:     Prognosis:  [x] Good [] Fair  [] Poor    Patient Requires Follow-up: [x] Yes  [] No    Return to Play:    [x]  N/A   []  Stage 1: Intro to Strength   []  Stage 2: Return to Run and Strength   []  Stage 3: Return to Jump and Strength   []  Stage 4: Dynamic Strength and Agility   []  Stage 5: Sport Specific Training     []  Ready to Return to Play, Meets All Above Stages   []  Not Ready for Return to Sports   Comments:            PLAN: See eval. PT 2x / week for 4 weeks prior to potential surgery. [x] Continue per plan of care [] Alter current plan (see comments)  [] Plan of care initiated [] Hold pending MD visit [] Discharge    Electronically signed by: Angie Baumann PT, DPT      Note: If patient does not return for scheduled/ recommended follow up visits, this note will serve as a discharge from care along with most recent update on progress.

## 2020-05-26 ENCOUNTER — HOSPITAL ENCOUNTER (OUTPATIENT)
Dept: PHYSICAL THERAPY | Age: 45
Setting detail: THERAPIES SERIES
Discharge: HOME | End: 2020-05-26
Payer: COMMERCIAL

## 2020-05-26 PROCEDURE — 97112 NEUROMUSCULAR REEDUCATION: CPT

## 2020-05-26 PROCEDURE — 97110 THERAPEUTIC EXERCISES: CPT

## 2020-05-26 NOTE — FLOWSHEET NOTE
Information:  Referring Practitioner: Dr. Evelin Esquivel of care signed (Y/N): []  Yes [x]  No     Date of Patient follow up with Physician:      Progress Report: []  Yes  [x]  No     Date Range for reporting period:  Beginnin2020  Ending:     Progress report due (10 Rx/or 30 days whichever is less): visit #10 or      Recertification due (POC duration/ or 90 days whichever is less): visit #8 or 2020    Visit # Insurance Allowable Auth required? Date Range   4 Medical necessity []  Yes  [x]  No n/a     Latex Allergy:  [x]NO      []YES  Preferred Language for Healthcare:   [x]English       []other:    Functional Scale:        Date assessed:  LEFS: raw score = ; dysfunction = 89%  2020    Pain level:  3/10     SUBJECTIVE:  Pt reports she is feeling about the same. When she tried to bear weight with the crutches it feels like the leg is going to buckle and she get pain inferior and medial to the patella. When she tries to [facility] with walking it feels like she is over doing it. She is hoping to schedule surgery when she speaks with Dr. Emily Chappell tomorrow. OBJECTIVE:   : pt arrives to department with knee brace (requested to remove for mat therex); L knee AROM ext: 6 deg from neutral, L knee AROM (with heel slide/stretch strap): 102 deg,  quad set (with towel roll under knee): fair to good, improved with NMES    :  L knee AROM ext: 8 deg from neutral, L knee AROM (with heel slide/stretch strap): 95 deg,  quad set (with towel roll under knee): fair to good, poor quad       RESTRICTIONS/PRECAUTIONS: ACL tear. NWB w/ B axillary crutches.      Exercises/Interventions:     Therapeutic Exercise (93018)  Resistance / level Sets/sec Reps Notes / Cues   Nu Step    Added    Calf stretch - seated w/ towel pull   30\"  5 Small towel roll under knee for support   Hamstring stretch - seated  Small towel roll under knee for support   Knee flexion PROM - EOB  10\" 10    Knee flexion PROM - heel slide for self care, mobility, lifting, and ambulation/stair navigation      Manual Treatments:  PROM / STM / Oscillations-Mobs:  G-I, II, III, IV (PA's, Inf., Post.)  [x] (50546) Provided manual therapy to mobilize LE, proximal hip and/or LS spine soft tissue/joints for the purpose of modulating pain, promoting relaxation,  increasing ROM, reducing/eliminating soft tissue swelling/inflammation/restriction, improving soft tissue extensibility and allowing for proper ROM for normal function with self care, mobility, lifting and ambulation. Modalities:  [] (20377) Vasopneumatic compression: Utilized vasopneumatic compression to decrease edema / swelling for the purpose of improving mobility and quad tone / recruitment which will allow for increased overall function including but not limited to self-care, transfers, ambulation, and ascending / descending stairs. Modalities:  Vaso to L knee x 10' (consider knee and ankle next visit). Charges:  Timed Code Treatment Minutes: 47   Total Treatment Minutes: 47     [] EVAL - LOW (90741)   [] EVAL - MOD (29177)  [] EVAL - HIGH (68505)  [] RE-EVAL (35728)  [x] GZ(84342) x 2      [] Ionto  [x] NMR (68841) x 1      [x] Vaso  [] Manual (22790) x       [] Ultrasound  [] TA x        [] Mech Traction (36160)  [] Aquatic Therapy x     [] ES (un) (34087):   [] Home Management Training x  [] ES(attended) (54739)   [] Dry Needling 1-2 muscles (64991):  [] Dry Needling 3+ muscles (222307  [] Group:      [] Other:     GOALS:  Patient stated goal: improve ROM and return to normal   []? Progressing: []? Met: []? Not Met: []? Adjusted     Therapist goals for Patient:   Short Term Goals: To be achieved in: 2 weeks  1. Independent in HEP and progression per patient tolerance, in order to prevent re-injury. []? Progressing: []? Met: []? Not Met: []? Adjusted  2.  Patient will have a decrease in pain to facilitate improvement in movement, function, and ADLs as indicated by Functional Deficits. []? Progressing: []? Met: []? Not Met: []? Adjusted     Long Term Goals: To be achieved in: 4-6 weeks  1. Disability index score of 50% or less for the LEFS to assist with reaching prior level of function. []? Progressing: []? Met: []? Not Met: []? Adjusted  2. Patient will demonstrate increased AROM to at least 0-120 to allow for proper joint functioning as indicated by patients Functional Deficits. []? Progressing: []? Met: []? Not Met: []? Adjusted  3. Patient will demonstrate an increase in Strength to at least good as well as good proximal hip strength and control to allow for proper functional mobility as indicated by patients Functional Deficits. []? Progressing: []? Met: []? Not Met: []? Adjusted  4. Patient will return to functional activities including walking w/o assistive device without increased symptoms or restriction. []? Progressing: []? Met: []? Not Met: []? Adjusted  5. To be determined post-op. []? Progressing: []? Met: []? Not Met: []? Adjusted         Overall Progression Towards Functional goals/ Treatment Progress Update:  [] Patient is progressing as expected towards functional goals listed. [] Progression is slowed due to complexities/Impairments listed. [] Progression has been slowed due to co-morbidities. [x] Plan just implemented, too soon to assess goals progression <30days   [] Goals require adjustment due to lack of progress  [] Patient is not progressing as expected and requires additional follow up with physician  [] Other    Persisting Functional Limitations/Impairments:  [x]Sitting [x]Standing   [x]Walking [x]Stairs   [x]Transfers [x]ADLs   [x]Squatting/bending [x]Kneeling  [x]Housework [x]Job related tasks  []Driving [x]Sports/Recreation   [x]Sleeping []Other:    ASSESSMENT:  Pt is a 38 yo female referred to PT s/p L ACL tear occurring mid April. She has been seen for 4 visits in PT.  She presents with slight improvement in ROM and quad set, however has had difficulty with therex progression secondary to pain and extensor lag. She continues to present with significant functional deficits. Current plan to to hold PT pending follow up with MD.     Treatment/Activity Tolerance:  [x] Pt able to complete treatment [] Patient limited by fatique  [] Patient limited by pain  [] Patient limited by other medical complications  [] Other:     Prognosis:  [x] Good [] Fair  [] Poor    Patient Requires Follow-up: [x] Yes  [] No    Return to Play:    [x]  N/A   []  Stage 1: Intro to Strength   []  Stage 2: Return to Run and Strength   []  Stage 3: Return to Jump and Strength   []  Stage 4: Dynamic Strength and Agility   []  Stage 5: Sport Specific Training     []  Ready to Return to Play, Meets All Above Stages   []  Not Ready for Return to Sports   Comments:            PLAN: See eval. PT 2x / week for 4 weeks prior to potential surgery. [x] Continue per plan of care [] Alter current plan (see comments)  [] Plan of care initiated [] Hold pending MD visit [] Discharge    Electronically signed by: Aura Ball PT, DPT      Note: If patient does not return for scheduled/ recommended follow up visits, this note will serve as a discharge from care along with most recent update on progress.

## 2020-05-27 ENCOUNTER — OFFICE VISIT (OUTPATIENT)
Dept: ORTHOPEDIC SURGERY | Age: 45
End: 2020-05-27
Payer: COMMERCIAL

## 2020-05-27 VITALS — HEIGHT: 63 IN | TEMPERATURE: 98.2 F | WEIGHT: 145 LBS | BODY MASS INDEX: 25.69 KG/M2

## 2020-05-27 PROBLEM — S83.512D ANTERIOR CRUCIATE LIGAMENT COMPLETE TEAR, LEFT, SUBSEQUENT ENCOUNTER: Status: ACTIVE | Noted: 2020-05-14

## 2020-05-27 PROCEDURE — 99213 OFFICE O/P EST LOW 20 MIN: CPT | Performed by: ORTHOPAEDIC SURGERY

## 2020-05-27 NOTE — PROGRESS NOTES
Honor Raymond Guardado MD  [facility]  [address]    History of Present Illness:  Chief Complaint   Patient presents with    Knee Pain     f/u for LT knee pain, ACL tear. Kathy Davies is a 39 y.o. female here for evaluation of left knee pain. She is here to discuss upcoming left ACL reconstruction. Her pain is improved from her last visit and she has been working with physical therapy to regain some range of motion. Pain is rated 3/10 in severity. She reports continued tenderness in the knee. She denies new injury. Medication Review:  Current Outpatient Medications   Medication Sig Dispense Refill    ibuprofen (ADVIL;MOTRIN) 600 MG tablet Take 1 tablet by mouth every 6 hours as needed for Pain 30 tablet 0    omeprazole (PRILOSEC) 20 MG delayed release capsule Take 20 mg by mouth Daily       No current facility-administered medications for this visit. Review of Systems:  Relevant review of systems reviewed and can be found in the Media section of patient's chart.        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 She expressed desire to have surgery as soon as possible, and I discussed the risks and benefits of this procedure with her. She will schedule her procedure for next Thursday at today's visit. We again reviewed her graft choice and she would prefer autograft bone patella tendon bone ACL reconstruction. She understands will be able to evaluate the meniscal cartilages as well and if there is been any damage from her secondary injury following her MRI we can repair that at the same time. She was given her postop T scope knee brace today. Once we have her surgical date set she will need her COVID testing within 7 days of surgery and understands the multiple steps that we are using to safeguard her patients from liam COVID-19 although still there were no guarantees that she will not develop the COVID-19 virus during her recovery and this could certainly complicate matters. I have reviewed patient's pertinent medical history, relevant laboratory and imaging studies, and past surgical history. Patient's medications have been reviewed and were discussed during the visit. Patient was advised to keep future appointments with their respective specialty care team(s). Patient had the opportunity to ask questions, all of which were answered to the best of my ability and with patient satisfaction. Patient understands and is agreeable with the care plan following today's visit. Patient is to schedule an appointment for any new or worsening symptoms. By signing my name below, Liz Wilson, attest that this documentation has been prepared under the direction and in the presence of Yemi Watts MD.   Electronically Signed: Kayley Solorio, 5/27/20, 1:32 PM EDT. Lauren Montero MD, personally performed the services described in this documentation.  All medical record entries made by the kayley were at my direction and in my presence. I have reviewed the chart and discharge instructions (if applicable) and agree that the record reflects my personal performance and is accurate and complete. Jed Sims MD, 6/7/20, 9:41 PM EDT. Some documentation was done using voice recognition dragon software. Every effort was made to ensure accuracy; however, inadvertent unintentional computerized transcription errors may be present.

## 2020-05-27 NOTE — LETTER
[facility]  Surgery Scheduling Form:  [facility]    DEMOGRAPHICS:                                                                                                              .    Patient Name:  Amelia Zaman  Patient :  1975   Patient SS#:      Patient Phone:  [phone] (home) [phone] (work) Alt. Patient Phone:    Patient Address:  [address] [place], [place] [address] [address]    PCP:  Mahogany Ambrosio MD  Payor/Plan Subscr  Sex Relation Sub. Ins. ID Effective Group Num   1. [id number] [facility] Danielle Paci 1975 Female  [id number]                                   [address]     DIAGNOSIS & PROCEDURE:                                                                                            .    Diagnosis:   Left anterior cruciate ligament tear S83.512D  Operation:  Arthroscopic Left anterior cruciate ligament bone patella tendon bone reconstruction [id number]  Location: [address] [place] [address]  Provider:  Ivette Rodarte.  DERRELL Franks Rose:                                                                                         .    Requested Date:  20    OR Time:  10:30                      Patient Arrival Time:  8:30  OR Time Required:  80  Minutes  Anesthesia:  General and Regional Block  Equipment:   CPM,   Mini C-Arm:  No   Standard C-Arm:  No  Status:  Outpatient  PAT Required:  No  Comments:Allergies: Sulfa antibiotics     20   BILLING INFORMATION:                                                                                                    .    Procedure:       CPT Code Modifier                        ORTHOPAEDIC SURGERY PRE-SURGICAL PHYSICIAN ORDERS    Amelia Zaman  1975  Date of Surgery: 20  Arthroscopic Left anterior cruciate ligament bone patella tendon bone reconstruction     Sulfa antibiotics  History & Physical:  [ ] [facility]   [ ] By Surgeon   By PCP [ Chioma Adame  Referrals:

## 2020-05-28 ENCOUNTER — TELEPHONE (OUTPATIENT)
Dept: ORTHOPEDIC SURGERY | Age: 45
End: 2020-05-28

## 2020-05-28 ENCOUNTER — HOSPITAL ENCOUNTER (OUTPATIENT)
Dept: PHYSICAL THERAPY | Age: 45
Setting detail: THERAPIES SERIES
Discharge: HOME | End: 2020-05-28
Payer: COMMERCIAL

## 2020-05-28 ENCOUNTER — OFFICE VISIT (OUTPATIENT)
Dept: PRIMARY CARE CLINIC | Age: 45
End: 2020-05-28

## 2020-05-28 NOTE — PROGRESS NOTES
Name_______________________________________Printed:____________________  Date and time of surgery__6/4 1030______________________Arrival Time:_0900_______________   1. The instructions given regarding when and if a patient needs to stop oral intake prior to surgery varies. Follow the specific instructions you were given                  ___Nothing to eat or to drink after Midnight the night before.                             ____Endoscopy patient follow your DRS instructions-generally you will be doing a part of the prep after Midnight                   __x__Carbo loading or ERAS instructions will be given to select patients-if you have been given those instructions -please do the following                           The evening before your surgery after dinner before midnight drink 40 ounces of gatorade. If you are diabetic use sugar free. The morning of surgery drink 40 ounces of water. This needs to be finished 3 hours prior to your surgery start time. 2. Take the following pills with a small sip of water on the morning of surgery_____prilosec______________________________________________                  Do not take blood pressure medications ending in pril or sartan the noel prior to surgery or the morning of surgery_   3. Aspirin, Ibuprofen, Advil, Naproxen, Vitamin E and other Anti-inflammatory products and supplements should be stopped for 5 -7days before surgery or as directed by your physician. 4. Check with your Doctor regarding stopping Plavix, Coumadin,Eliquis, Lovenox,Effient,Pradaxa,Xarelto, Fragmin or other blood thinners and follow their instructions. 5. Do not smoke, and do not drink any alcoholic beverages 24 hours prior to surgery. This includes NA Beer. Refrain from the usage of any recreational drugs. 6. You may brush your teeth and gargle the morning of surgery. DO NOT SWALLOW WATER   7.  You MUST make arrangements for a responsible adult to stay on site while you are here and take you

## 2020-05-31 LAB
SARS-COV-2: NOT DETECTED
SOURCE: NORMAL

## 2020-05-31 NOTE — PROGRESS NOTES
 BLADDER SUSPENSION      COLONOSCOPY N/A 11/26/2019    COLONOSCOPY POLYPECTOMY SNARE/COLD BIOPSY performed by Samira Torres MD at [address] [address] [facility]      TONSILLECTOMY      UPPER GASTROINTESTINAL ENDOSCOPY N/A 11/26/2019    EGD BIOPSY performed by Samira Torres MD at [address]       Current Outpatient Medications   Medication Sig Dispense Refill    ibuprofen (ADVIL;MOTRIN) 600 MG tablet Take 1 tablet by mouth every 6 hours as needed for Pain 30 tablet 0    omeprazole (PRILOSEC) 20 MG delayed release capsule Take 20 mg by mouth Daily       No current facility-administered medications for this visit. allergies, social and family histories were reviewed and updated as appropriate. Review of Systems:  Relevant review of systems reviewed and available in the patient's chart and scanned in under the MEDIA tab today. Vital Signs:  Temp 99.1 °F (37.3 °C)   Ht 5' 3\" (1.6 m)   Wt 144 lb (65.3 kg)   LMP 04/22/2020   BMI 25.51 kg/m²     General Exam:   Constitutional: She is adequately groomed with no evidence of malnutrition, obesity absent  Mental Status: She is oriented to time, place and person. Normal mentation and affect for age. Lymphatic: The lymphatic examination bilaterally reveals all areas to be without enlargement or induration. Vascular: Examination reveals no swelling or calf tenderness. Peripheral pulses are palpable and 2+. Neurological: She has good coordination and balance. There is no focal weakness or sensory deficit. Left Knee Examination:    Inspection:  Knee shows neutral alignment  Normal muscle bulk and tone for her age and activity level. No erythema or significant effusion. Palpation: Moderate tenderness over the joint line laterally and anterior laterally. She prefers a flexed knee posture.     Range of Motion: With extreme difficulty she can achieve active extension within about 10 degrees of neutral to 60 degrees of flexion with moderate pain    Strength:  Quad 3/ 5  ; Hamstrings 3/ 5. Gross motor to hip and ankle intact, no pain with logroll the hip. Stinchfield examination negative. Special Tests:      Lachman difficult to assess due to pain   Unable to assess as she is unable to bend to 90 degrees for anterior drawer or posterior drawer   Pain causes her to guard making assessment of varus or valgus stress instability difficult   Unable to flex past 90 degrees to check Sheldon's   negative Homans    Posterior tibial pulses are +2/4 capillary refill is brisk sensation is intact. Skin: There are no rashes, ulcerations or lesions. Gait: Mild to moderate limping favoring her left knee with a preferred slightly flexed knee posture. Radiology:     X-rays obtained today and reviewed in office:  Views 4 Left  Knee with comparison AP, flexion PA and skyline views of the right knee  Impression No evidence for acute fracture or subluxation / dislocation. On the comparison AP views both knees show slight squaring of the medial femoral condyles and slight joint space narrowing compatible with age. No significant joint effusion noted in the left knee on lateral view. Good alignment of both patella on skyline views. No lytic or blastic lesions within the metaphyseal regions of either knee. Impression:  Encounter Diagnoses   Name Primary?     Left knee pain, unspecified chronicity Yes    Loose body in knee, left knee        Office Procedures:  Orders Placed This Encounter   Procedures    XR Knee Bilateral Standing     Standing Status:   Future     Number of Occurrences:   1     Standing Expiration Date:   6/1/2020    XR KNEE LEFT (3 VIEWS)     Standing Status:   Future     Number of Occurrences:   1     Standing Expiration Date:   6/1/2020    MRI KNEE LEFT WO CONTRAST     ESSENTIAL     Standing Status:   Future     Number of Occurrences:   1     Standing Expiration Date:   5/1/2021

## 2020-06-01 PROCEDURE — L1832 KO ADJ JNT POS R SUP PRE CST: HCPCS | Performed by: ORTHOPAEDIC SURGERY

## 2020-06-03 ENCOUNTER — TELEPHONE (OUTPATIENT)
Dept: ORTHOPEDIC SURGERY | Age: 45
End: 2020-06-03

## 2020-06-04 ENCOUNTER — HOSPITAL ENCOUNTER (OUTPATIENT)
Age: 45
Setting detail: OUTPATIENT SURGERY
Discharge: HOME | End: 2020-06-04
Attending: ORTHOPAEDIC SURGERY | Admitting: ORTHOPAEDIC SURGERY
Payer: COMMERCIAL

## 2020-06-04 ENCOUNTER — ANESTHESIA (OUTPATIENT)
Dept: OPERATING ROOM | Age: 45
End: 2020-06-04
Payer: COMMERCIAL

## 2020-06-04 ENCOUNTER — ANESTHESIA EVENT (OUTPATIENT)
Dept: OPERATING ROOM | Age: 45
End: 2020-06-04
Payer: COMMERCIAL

## 2020-06-04 VITALS
SYSTOLIC BLOOD PRESSURE: 106 MMHG | OXYGEN SATURATION: 100 % | DIASTOLIC BLOOD PRESSURE: 58 MMHG | RESPIRATION RATE: 11 BRPM

## 2020-06-04 VITALS
OXYGEN SATURATION: 98 % | DIASTOLIC BLOOD PRESSURE: 62 MMHG | RESPIRATION RATE: 14 BRPM | HEART RATE: 48 BPM | WEIGHT: 142 LBS | BODY MASS INDEX: 25.16 KG/M2 | TEMPERATURE: 98.2 F | HEIGHT: 63 IN | SYSTOLIC BLOOD PRESSURE: 102 MMHG

## 2020-06-04 LAB
GLUCOSE BLD-MCNC: 98 MG/DL (ref 70–99)
HCG(URINE) PREGNANCY TEST: NEGATIVE
PERFORMED ON: NORMAL

## 2020-06-04 PROCEDURE — 7100000010 HC PHASE II RECOVERY - FIRST 15 MIN: Performed by: ORTHOPAEDIC SURGERY

## 2020-06-04 PROCEDURE — 7100000001 HC PACU RECOVERY - ADDTL 15 MIN: Performed by: ORTHOPAEDIC SURGERY

## 2020-06-04 PROCEDURE — 2780000010 HC IMPLANT OTHER: Performed by: ORTHOPAEDIC SURGERY

## 2020-06-04 PROCEDURE — 76942 ECHO GUIDE FOR BIOPSY: CPT | Performed by: FAMILY MEDICINE

## 2020-06-04 PROCEDURE — 3600000004 HC SURGERY LEVEL 4 BASE: Performed by: ORTHOPAEDIC SURGERY

## 2020-06-04 PROCEDURE — 6360000002 HC RX W HCPCS

## 2020-06-04 PROCEDURE — 6360000002 HC RX W HCPCS: Performed by: FAMILY MEDICINE

## 2020-06-04 PROCEDURE — 6360000002 HC RX W HCPCS: Performed by: ORTHOPAEDIC SURGERY

## 2020-06-04 PROCEDURE — C1769 GUIDE WIRE: HCPCS | Performed by: ORTHOPAEDIC SURGERY

## 2020-06-04 PROCEDURE — 7100000000 HC PACU RECOVERY - FIRST 15 MIN: Performed by: ORTHOPAEDIC SURGERY

## 2020-06-04 PROCEDURE — 3600000014 HC SURGERY LEVEL 4 ADDTL 15MIN: Performed by: ORTHOPAEDIC SURGERY

## 2020-06-04 PROCEDURE — 84703 CHORIONIC GONADOTROPIN ASSAY: CPT

## 2020-06-04 PROCEDURE — C1713 ANCHOR/SCREW BN/BN,TIS/BN: HCPCS | Performed by: ORTHOPAEDIC SURGERY

## 2020-06-04 PROCEDURE — 2500000003 HC RX 250 WO HCPCS: Performed by: NURSE ANESTHETIST, CERTIFIED REGISTERED

## 2020-06-04 PROCEDURE — 2720000010 HC SURG SUPPLY STERILE: Performed by: ORTHOPAEDIC SURGERY

## 2020-06-04 PROCEDURE — 6360000002 HC RX W HCPCS: Performed by: NURSE ANESTHETIST, CERTIFIED REGISTERED

## 2020-06-04 PROCEDURE — 3700000000 HC ANESTHESIA ATTENDED CARE: Performed by: ORTHOPAEDIC SURGERY

## 2020-06-04 PROCEDURE — 7100000011 HC PHASE II RECOVERY - ADDTL 15 MIN: Performed by: ORTHOPAEDIC SURGERY

## 2020-06-04 PROCEDURE — 3700000001 HC ADD 15 MINUTES (ANESTHESIA): Performed by: ORTHOPAEDIC SURGERY

## 2020-06-04 PROCEDURE — 64447 NJX AA&/STRD FEMORAL NRV IMG: CPT | Performed by: FAMILY MEDICINE

## 2020-06-04 PROCEDURE — 2580000003 HC RX 258: Performed by: ORTHOPAEDIC SURGERY

## 2020-06-04 PROCEDURE — C9359 IMPLNT,BON VOID FILLER-PUTTY: HCPCS | Performed by: ORTHOPAEDIC SURGERY

## 2020-06-04 PROCEDURE — 2709999900 HC NON-CHARGEABLE SUPPLY: Performed by: ORTHOPAEDIC SURGERY

## 2020-06-04 DEVICE — BIOSURE REGENSORB INTERFERENCE                                    SCREW 8 MM X 25MM
Type: IMPLANTABLE DEVICE | Site: KNEE | Status: FUNCTIONAL
Brand: BIOSURE

## 2020-06-04 DEVICE — ULTRA FAST-FIX ASSEMBLY - CURVED
Type: IMPLANTABLE DEVICE | Site: KNEE | Status: FUNCTIONAL
Brand: FAST-FIX

## 2020-06-04 DEVICE — BIOSURE REGENSORB INTERFERENCE                                    SCREW 7 MM X 20MM
Type: IMPLANTABLE DEVICE | Site: KNEE | Status: FUNCTIONAL
Brand: BIOSURE

## 2020-06-04 DEVICE — DBX PUTTY, 5CC
Type: IMPLANTABLE DEVICE | Site: KNEE | Status: FUNCTIONAL
Brand: DBX®

## 2020-06-04 RX ORDER — PROCHLORPERAZINE EDISYLATE 5 MG/ML
5 INJECTION INTRAMUSCULAR; INTRAVENOUS ONCE
Status: COMPLETED | OUTPATIENT
Start: 2020-06-04 | End: 2020-06-04

## 2020-06-04 RX ORDER — DIPHENHYDRAMINE HYDROCHLORIDE 50 MG/ML
12.5 INJECTION INTRAMUSCULAR; INTRAVENOUS
Status: DISCONTINUED | OUTPATIENT
Start: 2020-06-04 | End: 2020-06-04 | Stop reason: HOSPADM

## 2020-06-04 RX ORDER — ONDANSETRON 2 MG/ML
INJECTION INTRAMUSCULAR; INTRAVENOUS PRN
Status: DISCONTINUED | OUTPATIENT
Start: 2020-06-04 | End: 2020-06-04 | Stop reason: SDUPTHER

## 2020-06-04 RX ORDER — SODIUM CHLORIDE, SODIUM LACTATE, POTASSIUM CHLORIDE, CALCIUM CHLORIDE 600; 310; 30; 20 MG/100ML; MG/100ML; MG/100ML; MG/100ML
INJECTION, SOLUTION INTRAVENOUS CONTINUOUS
Status: DISCONTINUED | OUTPATIENT
Start: 2020-06-04 | End: 2020-06-04 | Stop reason: HOSPADM

## 2020-06-04 RX ORDER — HYDROMORPHONE HCL 110MG/55ML
0.25 PATIENT CONTROLLED ANALGESIA SYRINGE INTRAVENOUS EVERY 5 MIN PRN
Status: DISCONTINUED | OUTPATIENT
Start: 2020-06-04 | End: 2020-06-04 | Stop reason: HOSPADM

## 2020-06-04 RX ORDER — SODIUM CHLORIDE 0.9 % (FLUSH) 0.9 %
10 SYRINGE (ML) INJECTION PRN
Status: DISCONTINUED | OUTPATIENT
Start: 2020-06-04 | End: 2020-06-04 | Stop reason: HOSPADM

## 2020-06-04 RX ORDER — DEXAMETHASONE SODIUM PHOSPHATE 4 MG/ML
INJECTION, SOLUTION INTRA-ARTICULAR; INTRALESIONAL; INTRAMUSCULAR; INTRAVENOUS; SOFT TISSUE PRN
Status: DISCONTINUED | OUTPATIENT
Start: 2020-06-04 | End: 2020-06-04 | Stop reason: SDUPTHER

## 2020-06-04 RX ORDER — OXYCODONE HYDROCHLORIDE 5 MG/1
10 TABLET ORAL PRN
Status: DISCONTINUED | OUTPATIENT
Start: 2020-06-04 | End: 2020-06-04 | Stop reason: HOSPADM

## 2020-06-04 RX ORDER — SODIUM CHLORIDE 0.9 % (FLUSH) 0.9 %
10 SYRINGE (ML) INJECTION EVERY 12 HOURS SCHEDULED
Status: DISCONTINUED | OUTPATIENT
Start: 2020-06-04 | End: 2020-06-04 | Stop reason: HOSPADM

## 2020-06-04 RX ORDER — SODIUM CHLORIDE 9 MG/ML
INJECTION, SOLUTION INTRAVENOUS CONTINUOUS
Status: DISCONTINUED | OUTPATIENT
Start: 2020-06-04 | End: 2020-06-04 | Stop reason: HOSPADM

## 2020-06-04 RX ORDER — LIDOCAINE HYDROCHLORIDE 10 MG/ML
0.5 INJECTION, SOLUTION EPIDURAL; INFILTRATION; INTRACAUDAL; PERINEURAL ONCE
Status: DISCONTINUED | OUTPATIENT
Start: 2020-06-04 | End: 2020-06-04 | Stop reason: HOSPADM

## 2020-06-04 RX ORDER — FENTANYL CITRATE 50 UG/ML
50 INJECTION, SOLUTION INTRAMUSCULAR; INTRAVENOUS EVERY 5 MIN PRN
Status: DISCONTINUED | OUTPATIENT
Start: 2020-06-04 | End: 2020-06-04 | Stop reason: HOSPADM

## 2020-06-04 RX ORDER — PROPOFOL 10 MG/ML
INJECTION, EMULSION INTRAVENOUS PRN
Status: DISCONTINUED | OUTPATIENT
Start: 2020-06-04 | End: 2020-06-04 | Stop reason: SDUPTHER

## 2020-06-04 RX ORDER — PROCHLORPERAZINE EDISYLATE 5 MG/ML
INJECTION INTRAMUSCULAR; INTRAVENOUS
Status: COMPLETED
Start: 2020-06-04 | End: 2020-06-04

## 2020-06-04 RX ORDER — MEPERIDINE HYDROCHLORIDE 25 MG/ML
12.5 INJECTION INTRAMUSCULAR; INTRAVENOUS; SUBCUTANEOUS EVERY 5 MIN PRN
Status: DISCONTINUED | OUTPATIENT
Start: 2020-06-04 | End: 2020-06-04 | Stop reason: HOSPADM

## 2020-06-04 RX ORDER — PROMETHAZINE HYDROCHLORIDE 25 MG/ML
6.25 INJECTION, SOLUTION INTRAMUSCULAR; INTRAVENOUS PRN
Status: DISCONTINUED | OUTPATIENT
Start: 2020-06-04 | End: 2020-06-04 | Stop reason: HOSPADM

## 2020-06-04 RX ORDER — LABETALOL HYDROCHLORIDE 5 MG/ML
5 INJECTION, SOLUTION INTRAVENOUS EVERY 10 MIN PRN
Status: DISCONTINUED | OUTPATIENT
Start: 2020-06-04 | End: 2020-06-04 | Stop reason: HOSPADM

## 2020-06-04 RX ORDER — OXYCODONE HYDROCHLORIDE 5 MG/1
5 TABLET ORAL PRN
Status: DISCONTINUED | OUTPATIENT
Start: 2020-06-04 | End: 2020-06-04 | Stop reason: HOSPADM

## 2020-06-04 RX ORDER — LIDOCAINE HYDROCHLORIDE 20 MG/ML
INJECTION, SOLUTION INFILTRATION; PERINEURAL PRN
Status: DISCONTINUED | OUTPATIENT
Start: 2020-06-04 | End: 2020-06-04 | Stop reason: SDUPTHER

## 2020-06-04 RX ORDER — HYDROMORPHONE HCL 110MG/55ML
0.5 PATIENT CONTROLLED ANALGESIA SYRINGE INTRAVENOUS EVERY 5 MIN PRN
Status: DISCONTINUED | OUTPATIENT
Start: 2020-06-04 | End: 2020-06-04 | Stop reason: HOSPADM

## 2020-06-04 RX ORDER — OXYCODONE HYDROCHLORIDE AND ACETAMINOPHEN 5; 325 MG/1; MG/1
1-2 TABLET ORAL EVERY 6 HOURS PRN
Qty: 42 TABLET | Refills: 0 | Status: SHIPPED | OUTPATIENT
Start: 2020-06-04 | End: 2020-06-11

## 2020-06-04 RX ADMIN — LIDOCAINE HYDROCHLORIDE 100 MG: 20 INJECTION, SOLUTION INFILTRATION; PERINEURAL at 10:49

## 2020-06-04 RX ADMIN — PROCHLORPERAZINE EDISYLATE 5 MG: 5 INJECTION INTRAMUSCULAR; INTRAVENOUS at 13:34

## 2020-06-04 RX ADMIN — SODIUM CHLORIDE, POTASSIUM CHLORIDE, SODIUM LACTATE AND CALCIUM CHLORIDE: 600; 310; 30; 20 INJECTION, SOLUTION INTRAVENOUS at 12:41

## 2020-06-04 RX ADMIN — FENTANYL CITRATE 25 MCG: 50 INJECTION, SOLUTION INTRAMUSCULAR; INTRAVENOUS at 12:27

## 2020-06-04 RX ADMIN — DEXAMETHASONE SODIUM PHOSPHATE 8 MG: 4 INJECTION, SOLUTION INTRAMUSCULAR; INTRAVENOUS at 11:10

## 2020-06-04 RX ADMIN — ONDANSETRON 4 MG: 2 INJECTION INTRAMUSCULAR; INTRAVENOUS at 11:10

## 2020-06-04 RX ADMIN — SODIUM CHLORIDE, POTASSIUM CHLORIDE, SODIUM LACTATE AND CALCIUM CHLORIDE: 600; 310; 30; 20 INJECTION, SOLUTION INTRAVENOUS at 10:45

## 2020-06-04 RX ADMIN — FENTANYL CITRATE 25 MCG: 50 INJECTION, SOLUTION INTRAMUSCULAR; INTRAVENOUS at 12:32

## 2020-06-04 RX ADMIN — SODIUM CHLORIDE, POTASSIUM CHLORIDE, SODIUM LACTATE AND CALCIUM CHLORIDE: 600; 310; 30; 20 INJECTION, SOLUTION INTRAVENOUS at 09:26

## 2020-06-04 RX ADMIN — PROPOFOL 200 MG: 10 INJECTION, EMULSION INTRAVENOUS at 10:49

## 2020-06-04 RX ADMIN — FENTANYL CITRATE 50 MCG: 50 INJECTION, SOLUTION INTRAMUSCULAR; INTRAVENOUS at 10:48

## 2020-06-04 RX ADMIN — CEFAZOLIN SODIUM 2 G: 10 INJECTION, POWDER, FOR SOLUTION INTRAVENOUS at 10:44

## 2020-06-04 ASSESSMENT — PULMONARY FUNCTION TESTS
PIF_VALUE: 11
PIF_VALUE: 10
PIF_VALUE: 2
PIF_VALUE: 11
PIF_VALUE: 15
PIF_VALUE: 11
PIF_VALUE: 11
PIF_VALUE: 10
PIF_VALUE: 11
PIF_VALUE: 11
PIF_VALUE: 16
PIF_VALUE: 7
PIF_VALUE: 13
PIF_VALUE: 9
PIF_VALUE: 2
PIF_VALUE: 7
PIF_VALUE: 19
PIF_VALUE: 1
PIF_VALUE: 10
PIF_VALUE: 11
PIF_VALUE: 2
PIF_VALUE: 8
PIF_VALUE: 19
PIF_VALUE: 7
PIF_VALUE: 11
PIF_VALUE: 10
PIF_VALUE: 11
PIF_VALUE: 15
PIF_VALUE: 2
PIF_VALUE: 15
PIF_VALUE: 8
PIF_VALUE: 13
PIF_VALUE: 2
PIF_VALUE: 11
PIF_VALUE: 11
PIF_VALUE: 7
PIF_VALUE: 12
PIF_VALUE: 2
PIF_VALUE: 14
PIF_VALUE: 10
PIF_VALUE: 13
PIF_VALUE: 10
PIF_VALUE: 7
PIF_VALUE: 11
PIF_VALUE: 11
PIF_VALUE: 12
PIF_VALUE: 0
PIF_VALUE: 14
PIF_VALUE: 8
PIF_VALUE: 12
PIF_VALUE: 13
PIF_VALUE: 1
PIF_VALUE: 11
PIF_VALUE: 2
PIF_VALUE: 8
PIF_VALUE: 2
PIF_VALUE: 11
PIF_VALUE: 2
PIF_VALUE: 19
PIF_VALUE: 10
PIF_VALUE: 15
PIF_VALUE: 0
PIF_VALUE: 7
PIF_VALUE: 10
PIF_VALUE: 2
PIF_VALUE: 17
PIF_VALUE: 3
PIF_VALUE: 2
PIF_VALUE: 1
PIF_VALUE: 12
PIF_VALUE: 12
PIF_VALUE: 11
PIF_VALUE: 12
PIF_VALUE: 2
PIF_VALUE: 6
PIF_VALUE: 12
PIF_VALUE: 16
PIF_VALUE: 11
PIF_VALUE: 0
PIF_VALUE: 19
PIF_VALUE: 10
PIF_VALUE: 12
PIF_VALUE: 9
PIF_VALUE: 15
PIF_VALUE: 10
PIF_VALUE: 5
PIF_VALUE: 3
PIF_VALUE: 1
PIF_VALUE: 15
PIF_VALUE: 15
PIF_VALUE: 11
PIF_VALUE: 2
PIF_VALUE: 15
PIF_VALUE: 11
PIF_VALUE: 10
PIF_VALUE: 14
PIF_VALUE: 16
PIF_VALUE: 10
PIF_VALUE: 17
PIF_VALUE: 7
PIF_VALUE: 10
PIF_VALUE: 18
PIF_VALUE: 0
PIF_VALUE: 2
PIF_VALUE: 10
PIF_VALUE: 4
PIF_VALUE: 20
PIF_VALUE: 2
PIF_VALUE: 17
PIF_VALUE: 11
PIF_VALUE: 20
PIF_VALUE: 15
PIF_VALUE: 10

## 2020-06-04 ASSESSMENT — PAIN - FUNCTIONAL ASSESSMENT
PAIN_FUNCTIONAL_ASSESSMENT: 0-10
PAIN_FUNCTIONAL_ASSESSMENT: PREVENTS OR INTERFERES SOME ACTIVE ACTIVITIES AND ADLS

## 2020-06-04 ASSESSMENT — PAIN SCALES - GENERAL
PAINLEVEL_OUTOF10: 0

## 2020-06-04 ASSESSMENT — PAIN DESCRIPTION - DESCRIPTORS: DESCRIPTORS: ACHING

## 2020-06-04 NOTE — PROGRESS NOTES
Pt arrived to PACU from OR in Stable condition and is RASS -1 (Drowsy) . Respirations are Regular Pattern; RR 8-20 = 0 on room air. Skin warm and dry. Abd is  soft. Pain: denies at this time. Left knee surgical site(s) intact with dressing= clean, dry, intact and nontender. Will continue to monitor for safety and comfort. S/P: ARTHROSCOPIC LEFT KNEE MEDIAL MENISCU REPARI AND ANTERIOR CRUCIATE LIGAMENT BONE PATELLA TENDON BONE RECONSTRUCTION - (REGIONAL BLOCK) (Left Knee), with Dr. Tyrell Fuentes at [facility].

## 2020-06-04 NOTE — ANESTHESIA PRE PROCEDURE
Department of Anesthesiology  Preprocedure Note       Name:  Joana Monsivais   Age:  39 y.o.  :  1975                                          MRN:  [id number]         Date:  2020      Surgeon: Arlette Whalenr):  Abdirashid Byrne MD    Procedure: Procedure(s):  ARTHROSCOPIC LEFT ANTERIOR CRUCIATE LIGAMENT BONE PATELLA TENDON BONE RECONSTRUCTION - (REGIONAL BLOCK)    Medications prior to admission:   Prior to Admission medications    Medication Sig Start Date End Date Taking?  Authorizing Provider   ibuprofen (ADVIL;MOTRIN) 600 MG tablet Take 1 tablet by mouth every 6 hours as needed for Pain 20   Jeovanny Muniz PA-C   omeprazole (PRILOSEC) 20 MG delayed release capsule Take 20 mg by mouth Daily    Historical Provider, MD       Current medications:    Current Facility-Administered Medications   Medication Dose Route Frequency Provider Last Rate Last Dose    lactated ringers infusion   Intravenous Continuous Abdirashid Byrne MD        lidocaine PF 1 % injection 0.5 mL  0.5 mL Intradermal Once Abdirashid Byrne MD        ceFAZolin (ANCEF) 2 g in dextrose 5 % 100 mL IVPB  2 g Intravenous On Call to [address] [place] [address], MD        HYDROmorphone (DILAUDID) injection 0.25 mg  0.25 mg Intravenous Q5 Min PRN Mary Gomez MD        fentaNYL (SUBLIMAZE) injection 50 mcg  50 mcg Intravenous Q5 Min PRN Mary Gomez MD        HYDROmorphone (DILAUDID) injection 0.25 mg  0.25 mg Intravenous Q5 Min PRN Mary Gomez MD        HYDROmorphone (DILAUDID) injection 0.5 mg  0.5 mg Intravenous Q5 Min PRN Mary Gomez MD        oxyCODONE (ROXICODONE) immediate release tablet 5 mg  5 mg Oral PRN Mary Gomez MD        Or    oxyCODONE (ROXICODONE) immediate release tablet 10 mg  10 mg Oral PRN Mary Gomez MD        diphenhydrAMINE (BENADRYL) injection 12.5 mg  12.5 mg Intravenous Once PRN Mary Gomez MD        promethazine (PHENERGAN) injection 6.25 mg  6.25 mg Intravenous PRN Ahmet Dickens MD        labetalol (NORMODYNE;TRANDATE) injection 5 mg  5 mg Intravenous Q10 Min PRN Ahmet Dickens MD        meperidine (DEMEROL) injection 12.5 mg  12.5 mg Intravenous Q5 Min PRN Ahmet Dickens MD           Allergies:     Allergies   Allergen Reactions    Sulfa Antibiotics Rash       Problem List:    Patient Active Problem List   Diagnosis Code    Spontaneous vaginal delivery O80    Anterior cruciate ligament complete tear, left, subsequent encounter S83.512D       Past Medical History:        Diagnosis Date    Anesthesia     HR has dropped after surgeries in the past    Anxiety     Depression     Disease of blood and blood forming organ     Liden factor V    Factor 5 Leiden mutation, heterozygous ([facility] [address]     Hashimoto's disease     no meds    Postpartum depression     no meds    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, not pregnant     Thyroid disease     Auto-immune Hashimotos- no meds       Past Surgical History:        Procedure Laterality Date    APPENDECTOMY      BLADDER SUSPENSION      COLONOSCOPY N/A 11/26/2019    COLONOSCOPY POLYPECTOMY SNARE/COLD BIOPSY performed by Ketty Garcia MD at [address] [facility] ENDOSCOPY N/A 11/26/2019    EGD BIOPSY performed by Ketty Garcia MD at [address] [facility] [address] History:    Social History     Tobacco Use    Smoking status: Never Smoker    Smokeless tobacco: Never Used   Substance Use Topics    Alcohol use: Yes     Comment: socially                                Counseling given: Not Answered      Vital Signs (Current):   Vitals:    05/28/20 1537   Weight: 145 lb (65.8 kg)   Height: 5' 3\" (1.6 m)                                              BP Readings from Last 3 Encounters:   05/08/20 121/77   11/26/19 102/63   11/26/19 113/67       NPO Status: BMI:   Wt Readings from Last 3 Encounters:   05/28/20 145 lb (65.8 kg)   05/27/20 145 lb (65.8 kg)   05/11/20 145 lb (65.8 kg)     Body mass index is 25.69 kg/m². CBC:   Lab Results   Component Value Date    WBC 6.6 10/01/2019    RBC 4.89 10/01/2019    HGB 14.9 10/01/2019    HCT 44.2 10/01/2019    MCV 90.3 10/01/2019    RDW 13.0 10/01/2019     10/01/2019       CMP:   Lab Results   Component Value Date     10/01/2019    K 4.5 10/01/2019     10/01/2019    CO2 24 10/01/2019    BUN 14 10/01/2019    CREATININE 0.6 10/01/2019    GFRAA >60 10/01/2019    GFRAA >60 08/28/2012    AGRATIO 1.9 10/01/2019    LABGLOM >60 10/01/2019    GLUCOSE 71 10/01/2019    PROT 7.8 10/01/2019    PROT 7.2 08/28/2012    CALCIUM 9.8 10/01/2019    BILITOT 0.6 10/01/2019    ALKPHOS 56 10/01/2019    AST 14 10/01/2019    ALT 11 10/01/2019       POC Tests: No results for input(s): POCGLU, POCNA, POCK, POCCL, POCBUN, POCHEMO, POCHCT in the last 72 hours.     Coags:   Lab Results   Component Value Date    PROTIME 10.3 07/25/2017    PROTIME 12.4 04/06/2011    INR 0.91 07/25/2017    APTT 23.0 07/25/2017       HCG (If Applicable):   Lab Results   Component Value Date    PREGTESTUR Negative 11/26/2019        ABGs: No results found for: PHART, PO2ART, MMZ1SZG, INN5ANP, BEART, Q5LBOCDZ     Type & Screen (If Applicable):  No results found for: LABABO, LABRH    Drug/Infectious Status (If Applicable):  Lab Results   Component Value Date    HEPCAB Non-Reactive (Negative) 09/18/2012       COVID-19 Screening (If Applicable):   Lab Results   Component Value Date    COVID19 Not Detected 05/28/2020         Anesthesia Evaluation   history of anesthetic complications:   Airway: Mallampati: II  TM distance: >3 FB   Neck ROM: full  Mouth opening: > = 3 FB Dental:          Pulmonary:                              Cardiovascular:            Rhythm: regular  Rate: normal                    Neuro/Psych:               GI/Hepatic/Renal:

## 2020-06-04 NOTE — OP NOTE
Operative Note      Patient: Leo Champion  YOB: 1975  MRN: [id number]    Date of Procedure: 6/4/2020    Pre-Op Diagnosis: LEFT ANTERIOR CRUCIATE LIGAMENT TEAR S83.512D    Post-Op Diagnosis: Left ACL tear and left medial meniscus tear       Procedure(s):  ARTHROSCOPIC ANTERIOR CRUCIATE LIGAMENT BONE PATELLA TENDON BONE RECONSTRUCTION and LEFT KNEE MEDIAL MENISCU REPAIR - (REGIONAL BLOCK)    Surgeon(s):  Godwin Weber MD    Assistant:   First Assistant: Edwige Ng    Anesthesia: General    Estimated Blood Loss (mL): less than 50     Complications: None    Specimens:   * No specimens in log *    Implants:  Implant Name Type Inv. Item Serial No.  Lot No. LRB No. Used Action   ANCHOR ULTR FASTFIX CRV Fastener ANCHOR ULTR FASTFIX CRV  [employer] [id number] Left 1 Implanted   PUTTY GRAFT BONE DEMINRL SUB 5ML FD - [id number] Bone/Graft/Tissue/Human/Synth PUTTY GRAFT BONE DEMINRL SUB 5ML FD [id number] MUSCULOSKELETAL TRANSPLANT FND  Left 1 Implanted   SCREW BIOSURE REGENESORB 7X20MM Screw/Plate/Nail/Guilherme SCREW BIOSURE REGENESORB 7X20MM  [employer]: ENDOSCOPY [id number] Left 1 Implanted   SCREW BIOSURE REGENESORB 8X25MM Screw/Plate/Nail/Guilherme SCREW BIOSURE REGENESORB 8X25MM  [employer]: ENDOSCOPY [id number] Left 1 Implanted         Drains: * No LDAs found *    Findings: Complete ACL tear, vertical posterior horn medial meniscus tear approximately 5-6 mm long. Grade 3 chondral damage to the weight bearing articular surface of the lateral femoral condyle 15 by 15 mm estimated      Indications for Operation  Knee pain  And clinical examination consistent with ACL deficiency. Also,  MRI confirmed ACL tear. The patient chose to proceed with the aforementioned procedures. At no time were any guarantees implied or stated. Informed Consent  TheI risks and possible complications of this procedure have been discussed in detail.   The post operative protocol graft and properly positioned hardware. Closure  The tourniquet was deflated. The patella harvest site was bone grafted with DBM putty. The tibial periosteum and the patellar paratenon were closed with 0-Vicryl sutures. The subcutaneous tissue was closed with 2-0 Vicryl suture and the skin closed with 4-0 Monocryl. Marcaine (0.5%) was injected into the joint. Sterile dressings, a cryotherapy pad, and an elastic bandage were placed. A hinged knee brace locked in full extension was fitted prior to leaving the operative suite. The patient was awakened and taken to the postoperative area in stable condition. The toes were pink and warm. All sponge and needle counts were correct. The procedure was completed in a satisfactory fashion.         Electronically signed by Olivia Ferreira MD on 6/4/2020 at 1:43 PM

## 2020-06-04 NOTE — ANESTHESIA PROCEDURE NOTES
Peripheral Block    Patient location during procedure: pre-op  Start time: 6/4/2020 10:06 AM  End time: 6/4/2020 10:07 AM  Staffing  Anesthesiologist: Theotis Galeazzi, MD  Performed: anesthesiologist   Preanesthetic Checklist  Completed: patient identified, site marked, surgical consent, pre-op evaluation, timeout performed, IV checked, risks and benefits discussed, monitors and equipment checked, anesthesia consent given, oxygen available and patient being monitored  Peripheral Block  Patient position: supine  Prep: ChloraPrep  Patient monitoring: cardiac monitor, continuous pulse ox, frequent blood pressure checks and IV access  Block type: Sciatic  Laterality: left  Injection technique: single-shot  Procedures: ultrasound guided  Local infiltration: lidocaine  Infiltration strength: 1 %  Dose: 3 mL  Popliteal  Provider prep: mask and sterile gloves  Local infiltration: lidocaine  Needle  Needle type: combined needle/nerve stimulator   Needle gauge: 21 G  Needle length: 10 cm  Needle localization: ultrasound guidance  Assessment  Injection assessment: negative aspiration for heme, no paresthesia on injection and local visualized surrounding nerve on ultrasound  Paresthesia pain: none  Slow fractionated injection: yes  Hemodynamics: stable  Additional Notes  Left sciatic nb w us to 0.4 mA.  0.25% bup 25 ml          Reason for block: post-op pain management and at surgeon's request

## 2020-06-12 ENCOUNTER — TELEPHONE (OUTPATIENT)
Dept: ORTHOPEDIC SURGERY | Age: 45
End: 2020-06-12

## 2020-06-17 ENCOUNTER — OFFICE VISIT (OUTPATIENT)
Dept: ORTHOPEDIC SURGERY | Age: 45
End: 2020-06-17

## 2020-06-17 VITALS — HEIGHT: 63 IN | BODY MASS INDEX: 24.8 KG/M2 | WEIGHT: 140 LBS

## 2020-06-17 PROCEDURE — 99024 POSTOP FOLLOW-UP VISIT: CPT | Performed by: PHYSICIAN ASSISTANT

## 2020-06-17 RX ORDER — ASPIRIN 325 MG
325 TABLET ORAL DAILY
COMMUNITY
End: 2020-09-15 | Stop reason: ALTCHOICE

## 2020-06-17 RX ORDER — OXYCODONE HYDROCHLORIDE AND ACETAMINOPHEN 5; 325 MG/1; MG/1
1-2 TABLET ORAL EVERY 6 HOURS PRN
Qty: 42 TABLET | Refills: 0 | Status: SHIPPED | OUTPATIENT
Start: 2020-06-17 | End: 2020-07-01 | Stop reason: SDUPTHER

## 2020-06-17 NOTE — PROGRESS NOTES
Patient Name: Zabrina Daly  Medical Record Number: [id number]  YOB: 1975  Date of Encounter: 6/17/2020     Chief Complaint   Patient presents with    Post-Op Check     ARTHROSCOPIC ANTERIOR CRUCIATE LIGAMENT BONE PATELLA TENDON BONE RECONSTRUCTION and LEFT KNEE MEDIAL MENISCU REPAIR; 6/4/20. History of Present Illness:   Ms. Zabrina Daly is here in 2 week follow up regarding her left ACL reconstruction with medial meniscus repair on 6/4/2020. Patient states she is in a lot of pain. She is still wearing her T scope brace, using crutches, and doing only touchdown weightbearing. She has been using her CPM machine stating she has gotten up to 60 degrees of flexion. She feels swelling has been improving and surgical incisions are healing well. The patient's past medical history, medications, allergies, family history, social history, and review of systems have been reviewed, and dated and are recorded in the chart under the 'MEDIA\" tab. Physical Exam:    Ms. Zabrina Daly appears well, she is in no apparent distress, she demonstrates appropriate mood & affect. She is alert and oriented to person, place and time. Ht 5' 3\" (1.6 m)   Wt 140 lb (63.5 kg)   LMP 05/23/2020   BMI 24.80 kg/m²     On examination of patient's left knee there is moderate postsurgical swelling which is improving as expected. Surgical incisions appear to be healing well without signs of infection. Patient is very reluctant to go through range of motion exercises secondary to pain. There is mild lower extremity edema. Radiology:  X-rays obtained and reviewed in office:   Views: 2 view left knee including AP and lateral  Impression: Patient is status post ACL reconstruction. Tunnels are in appropriate position.     Orders:  Orders Placed This Encounter   Procedures    XR KNEE LEFT (1-2 VIEWS)    External Referral To Home Health       Impression:   Diagnosis Orders   1. 6/4/20 LEFT ACL reconstruction   XR KNEE LEFT (1-2 VIEWS)    oxyCODONE-acetaminophen (PERCOCET) 5-325 MG per tablet    External Referral To Home Health   2. 6/4/20 LEFT medial meniscus repair  oxyCODONE-acetaminophen (PERCOCET) 5-325 MG per tablet    External Referral To Home Health       Treatment Plan:    Patient is 2 weeks postop and seems to be doing well. She is having a lot of pain and is given a refill of oxycodone. Patient can start weightbearing as tolerated on the left lower extremity. She can unlock her T scope brace to 90 degrees when sitting. She will continue locking her brace and 0 degrees of extension with ambulation. Patient is fitted for a functional brace at today's visit. She feels she will benefit from home physical therapy. She will follow back in 2 weeks at which time we should be able to transition her to a functional brace if she has good quad function. Patient is advised to follow back before then with any concerns. Abeba Nieto was informed of the results of any imaging. We discussed treatment options and a time was given to answer questions. A plan was proposed and Abeba Emilia understand and accepts this course of care. Electronically signed by Anupama Mendieta PA-C on 0/87/8680  Board Certified [facility]    Please note that portions of this note were completed with a voice recognition program.  Efforts were made to edit the dictations but occasionally words are mis-transcribed.

## 2020-06-18 ENCOUNTER — TELEPHONE (OUTPATIENT)
Dept: ORTHOPEDIC SURGERY | Age: 45
End: 2020-06-18

## 2020-06-23 ENCOUNTER — TELEPHONE (OUTPATIENT)
Dept: ORTHOPEDIC SURGERY | Age: 45
End: 2020-06-23

## 2020-06-23 NOTE — TELEPHONE ENCOUNTER
Spoke to patient and let her know I would call [facility] and check on status. Called [facility] and placed new order as last one ws not received.

## 2020-06-25 ENCOUNTER — TELEPHONE (OUTPATIENT)
Dept: ORTHOPEDIC SURGERY | Age: 45
End: 2020-06-25

## 2020-06-30 ENCOUNTER — TELEPHONE (OUTPATIENT)
Dept: ORTHOPEDIC SURGERY | Age: 45
End: 2020-06-30

## 2020-07-01 ENCOUNTER — TELEPHONE (OUTPATIENT)
Dept: ORTHOPEDIC SURGERY | Age: 45
End: 2020-07-01

## 2020-07-01 ENCOUNTER — OFFICE VISIT (OUTPATIENT)
Dept: ORTHOPEDIC SURGERY | Age: 45
End: 2020-07-01

## 2020-07-01 VITALS — BODY MASS INDEX: 24.8 KG/M2 | TEMPERATURE: 99 F | HEIGHT: 63 IN | WEIGHT: 140 LBS

## 2020-07-01 PROCEDURE — 99024 POSTOP FOLLOW-UP VISIT: CPT | Performed by: PHYSICIAN ASSISTANT

## 2020-07-01 RX ORDER — OXYCODONE HYDROCHLORIDE AND ACETAMINOPHEN 5; 325 MG/1; MG/1
1-2 TABLET ORAL EVERY 6 HOURS PRN
Qty: 42 TABLET | Refills: 0 | Status: SHIPPED | OUTPATIENT
Start: 2020-07-01 | End: 2020-07-27 | Stop reason: SDUPTHER

## 2020-07-01 NOTE — TELEPHONE ENCOUNTER
Patient has been scheduled for 10-10:30am at [address] [place] [address] on Thursday 7/2/20 for knee brace fitting.

## 2020-07-01 NOTE — LETTER
[facility]  [address] [address]  Phone: [phone]  Fax: [phone]    Scout Jollydall        July 1, 2020     Patient: Renna Dance   YOB: 1975   Date of Visit: 7/1/2020       To Whom it May Concern:    Lui Cintron was seen in my clinic on 7/1/2020. She has been off work since 5/8/2020 for her injury that required surgery and will be able to return to work 8/11/2020 estimated. .    If you have any questions or concerns, please don't hesitate to call.     Sincerely,           Yue Gayle PA-C

## 2020-07-01 NOTE — TELEPHONE ENCOUNTER
Called patient to let her know knee brace arrived in shipment. Left voicemail for day/time/location to be fit. Brace is currently at [facility] office. Left name, title and department for patient to call back and schedule.

## 2020-07-01 NOTE — PROGRESS NOTES
Patient Name: Zohreh Marcus  Medical Record Number: [id number]  YOB: 1975  Date of Encounter: 7/1/2020     Chief Complaint   Patient presents with    Post-Op Check     ACL BTB Reconstruction and medial meniscus repair 6/4/2020       History of Present Illness:   Ms. Zohreh Marcus is here in 4 week follow up regarding her left ACL reconstruction with medial meniscus repair on 6/4/2020. Patient states she is still having significant pain of the left knee. She states her left knee still feels very weak and she is having difficulty with range of motion. She is still wearing her T scope brace and using 2 crutches. The patient's past medical history, medications, allergies, family history, social history, and review of systems have been reviewed, and dated and are recorded in the chart under the 'MEDIA\" tab. Physical Exam:    Ms. Zohreh Marcus appears well, she is in no apparent distress, she demonstrates appropriate mood & affect. She is alert and oriented to person, place and time. Temp 99 °F (37.2 °C)   Ht 5' 3\" (1.6 m)   Wt 140 lb (63.5 kg)   BMI 24.80 kg/m²     On examination of patient's left knee there is mild swelling which is improving as expected. Surgical incision is healing well without signs of infection. She has generalized tenderness on palpation of the left knee. She is struggling quite a bit with active range of motion of the left knee. She lacks about 10 degrees of passive extension and has flexion to 85 degrees with pain. She has a lot of difficulty with active knee extension. There is very mild left lower extremity edema.       Orders:  Orders Placed This Encounter   Procedures   [facility] [place]       Impression:   Diagnosis Orders   1. 6/4/20 LEFT ACL reconstruction   oxyCODONE-acetaminophen (PERCOCET) 5-325 MG per tablet    [facility]   2. 6/4/20 LEFT medial meniscus repair  oxyCODONE-acetaminophen (PERCOCET) 5-325 MG per [facility]       Treatment Plan:    Patient is 4 weeks postop. She is still having a lot of pain and has been trying to take the oxycodone sparingly. She is given a refill of oxycodone. She is still wearing her T scope brace locked at 90 degrees of flexion. She is still using 2 crutches but states she has been doing weightbearing on the left lower extremity. Patient is struggling quite a bit with active and passive range of motion of her knee secondary to pain. She is struggling with active knee extensions. She is scheduled to get fitted for her functional brace tomorrow however I do not feel it is safe for patient to transition to the functional brace yet. I feel she should continue wearing the T scope brace locked in extension with ambulation and stairs until she feels she is stable to ambulate with it unlocked. She has no limitation with flexion. Patient will start outpatient physical therapy as soon as possible. Advised patient to have physical therapy tell patient when she is safe to transition to a functional brace. Patient has a tentative return to work date of 8/11/2020. She will follow back in 4 weeks or before that time with any concerns. Pal Funez was informed of the results of any imaging. We discussed treatment options and a time was given to answer questions. A plan was proposed and Pal Funez understand and accepts this course of care. Electronically signed by Argelia Yi PA-C on 4/2/0643  Board Certified [facility]    Please note that portions of this note were completed with a voice recognition program.  Efforts were made to edit the dictations but occasionally words are mis-transcribed.

## 2020-07-02 PROCEDURE — L1845 KO DOUBLE UPRIGHT PRE CST: HCPCS | Performed by: ORTHOPAEDIC SURGERY

## 2020-07-09 ENCOUNTER — HOSPITAL ENCOUNTER (OUTPATIENT)
Dept: PHYSICAL THERAPY | Age: 45
Setting detail: THERAPIES SERIES
Discharge: HOME | End: 2020-07-09
Payer: COMMERCIAL

## 2020-07-09 PROCEDURE — 97164 PT RE-EVAL EST PLAN CARE: CPT

## 2020-07-09 PROCEDURE — 97110 THERAPEUTIC EXERCISES: CPT

## 2020-07-09 PROCEDURE — 97530 THERAPEUTIC ACTIVITIES: CPT

## 2020-07-09 NOTE — PLAN OF CARE
Gilda Pearson  Phone: [phone]   Fax: [phone]   Physical Therapy Re-Certification Plan of Care    Dear Referring Practitioner: Dr. Juan Rodriguez,    We had the pleasure of treating the following patient for physical therapy services at [address] [place] [address]. A summary of our findings can be found in the updated assessment below. This includes our plan of care. If you have any questions or concerns regarding these findings, please do not hesitate to contact me at the office phone number checked above. Thank you for the referral.     Physician Signature:________________________________Date:__________________  By signing above (or electronic signature), therapists plan is approved by physician    SUBJECTIVE: Patient stated complaint: L knee original injury - was trying to jump over something and felt it pop about a month ago. Last Thursday, had MRI and later that evening fell down her back steps resulting in increased pain. Went to ER Friday 5/8 - was given immobilizer, crutches and pain meds. Returned to Dr. Juan Rodriguez on Monday 5/11 and was given smaller hinged knee brace. Pt has been NWB L LE w/ B axillary crutches. Pt conts to have pain and twinges. Swelling down into foot resulting in difficulty getting shoe on. Difficulty getting comfortable in bed and moving in bed. Pt also notes foot gets very cold - has been checking pulse - worse at night. She fell again after her MRI that then created meniscal injury and was also repaired during surgery on 6/4/2020. Patient had home health for 5 days in 2 weeks when she was 2 weeks post-op (was supposed to have visits spread out but [facility] lost her order for therapy). She is struggling with quad activation as well as ROM.       Relevant Medical History: depression   Functional Outcome: LEFS: raw score = 9/80; dysfunction = 89%     Pain Scale:  Current = 2/10  At worst = 9/10  Easing factors: none  Provocative factors: movement to end ranges, weight bearing, stairs      Type: [x]? Constant       []? Intermittent  []? Radiating     []? Localized     []? 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   (+) mild TTP in posterior knee and calf.      Quad tone:   R = normal  L = trace     Functional Mobility/Transfers: cautious and guarded.      Posture: guarded w/ L LE. Unable to fully extend L knee, even in long sitting.      Bandages/Dressings/Incisions: n/a     Gait: WBAT L LE w/ B axillary crutches, T scope brace donned unlocked.  Does not attain TKE with stance phase        PROM AROM     L R L R   Knee Flexion 100 (with heel slides)  91 147   Knee Extension Lacking 4   (heel prop after stretching, no OP)  Lacking 4 (poor quad facilitation) 0         Strength (0-5) / Myotomes - NT due to recent surgery Left Right   Hip Flexion - supine     Hip Flexion - seated (L1-2)     Hip Abduction     Hip Adduction     Hip ER     Hip IR     Quads (L2-4)     Hamstrings           Flexibility     Hamstrings (90/90)     ITB (Sheba)     Quads (Ely's)     Hip Flexor Jauregui Miguel)             Girth       Mid patella     Suprapatellar     Figure 8     Transmalleolar           Overall Response to Treatment:   []Patient is responding well to treatment and improvement is noted with regards  to goals   []Patient should continue to improve in reasonable time if they continue HEP   []Patient has plateaued and is no longer responding to skilled PT intervention    []Patient is getting worse and would benefit from return to referring MD   []Patient unable to adhere to initial POC   [x]Other: Pt is a 40 yo female referred to PT s/p L ACL tear occurring mid April. She had ACL repair and menisectomy on 2020. Patient with decreased L knee ROM and strength, with poor facilitation of quad contraction. Patient to benefit from skilled PT to address deficits and promote improved ROM, strength, gait pattern, and return to PLOF. Date range of Visits: 20  Total Visits: 1    Recommendation:    [x]Continue PT 3x / wk for 3 weeks then 2x/week for 9 weeks. []Hold PT, pending MD visit    Physical Therapy Treatment Note/ Progress Report:     Date:  2020    Patient Name:  Gulshan Rich    :  1975  MRN: [id number]  Restrictions/Precautions:    Medical/Treatment Diagnosis Information:  Diagnosis: S83.512 (ICD-10-CM) - Anterior cruciate ligament complete tear, left  Treatment Diagnosis: L knee pain; L knee stiffness; L LE atrophy; L LE swelling; difficulty walking  Insurance/Certification information:  PT Insurance Information: [employer] [place] - visits per medical necessity; no auth  Physician Information:  Referring Practitioner: Dr. Harp Boards of care signed (Y/N): []  Yes [x]  No     Date of Patient follow up with Physician:      Progress Report: [x]  Yes  []  No     Date Range for reporting period:  Beginnin2020  Ending:     Progress report due (10 Rx/or 30 days whichever is less): visit #10 or 9032     Recertification due (POC duration/ or 90 days whichever is less): visit #27 or 10/1/2020 (12 weeks)    Visit # Insurance Allowable Auth required?  Date Range   1  4 pre-op Medical necessity   &    Not Covered []  Yes  [x]  No n/a     Latex Allergy:  [x]NO      []YES  Preferred Language for Healthcare:   [x]English       []other:    Functional Scale:        Date assessed:  LEFS: raw score = 17/80; dysfunction = 78.75%  2020    Pain level:  2-710     SUBJECTIVE:   Patient reports she feels very weak Knee Flexion AAROM at Wall - 10 reps - 3 sets - 3-5x daily - 7x weekly     Therapeutic Exercise and NMR EXR  [x] (86913) Provided verbal/tactile cueing for activities related to strengthening, flexibility, endurance, ROM for improvements in LE, proximal hip, and core control with self care, mobility, lifting, ambulation. [x] (26800) Provided verbal/tactile cueing for activities related to improving balance, coordination, kinesthetic sense, posture, motor skill, proprioception  to assist with LE, proximal hip, and core control in self care, mobility, lifting, ambulation and eccentric single leg control.   [] (21835) Therapist is in constant attendance of 2 or more patients providing skilled therapy interventions, but not providing any significant amount of measurable one-on-one time to either patient, for improvements in LE, proximal hip, and core control in self care, mobility, lifting, ambulation and eccentric single leg control.      NMR and Therapeutic Activities:    [x] (91645 or 79390) Provided verbal/tactile cueing for activities related to improving balance, coordination, kinesthetic sense, posture, motor skill, proprioception and motor activation to allow for proper function of core, proximal hip and LE with self care and ADLs  [] (92924) Gait Re-education- Provided training and instruction to the patient for proper LE, core and proximal hip recruitment and positioning and eccentric body weight control with ambulation re-education including up and down stairs     Home Exercise Program:    [x] (12285) Reviewed/Progressed HEP activities related to strengthening, flexibility, endurance, ROM of core, proximal hip and LE for functional self-care, mobility, lifting and ambulation/stair navigation   [] (58435)Reviewed/Progressed HEP activities related to improving balance, coordination, kinesthetic sense, posture, motor skill, proprioception of core, proximal hip and LE for self care, mobility, lifting, and achieved in: 12 weeks  1. Disability index score of 20% or less for the LEFS to assist with reaching prior level of function. []? Progressing: []? Met: []? Not Met: []? Adjusted  2. Patient will demonstrate increased AROM to at least 0-120 to allow for proper joint functioning as indicated by patients Functional Deficits. []? Progressing: []? Met: []? Not Met: []? Adjusted  3. Patient will demonstrate an increase in Strength to at least 4+/5 quad as well as good proximal hip strength and control to allow for proper functional mobility as indicated by patients Functional Deficits. []? Progressing: []? Met: []? Not Met: []? Adjusted  4. Patient will return to functional activities including walking w/o assistive device without increased symptoms or restriction. []? Progressing: []? Met: []? Not Met: []? Adjusted  5. Patient will be able to return to work full duty without restriction. []? Progressing: []? Met: []? Not Met: []? Adjusted         Overall Progression Towards Functional goals/ Treatment Progress Update:  [] Patient is progressing as expected towards functional goals listed. [] Progression is slowed due to complexities/Impairments listed. [] Progression has been slowed due to co-morbidities. [x] Plan just implemented, too soon to assess goals progression <30days   [] Goals require adjustment due to lack of progress  [] Patient is not progressing as expected and requires additional follow up with physician  [] Other    Persisting Functional Limitations/Impairments:  [x]Sitting [x]Standing   [x]Walking [x]Stairs   [x]Transfers [x]ADLs   [x]Squatting/bending [x]Kneeling  [x]Housework [x]Job related tasks  []Driving [x]Sports/Recreation   [x]Sleeping []Other:    ASSESSMENT:  Pt is a 40 yo female referred to PT s/p L ACL tear occurring mid April. She had ACL repair and menisectomy on 6/4/2020. Patient with decreased L knee ROM and strength, with poor facilitation of quad contraction.  Assess for

## 2020-07-13 ENCOUNTER — HOSPITAL ENCOUNTER (OUTPATIENT)
Dept: PHYSICAL THERAPY | Age: 45
Setting detail: THERAPIES SERIES
Discharge: HOME | End: 2020-07-13
Payer: COMMERCIAL

## 2020-07-13 PROCEDURE — 97112 NEUROMUSCULAR REEDUCATION: CPT

## 2020-07-13 PROCEDURE — 97110 THERAPEUTIC EXERCISES: CPT

## 2020-07-13 NOTE — FLOWSHEET NOTE
stretch - long sit w/ towel pull   30\"  5    Long Sitting Hamstring stretch  30\" 5    Ankle Pumps  1 30    Knee flexion PROM - EOB  10\" 10    Knee flexion PROM - heel slide w/ strap  10\" 10       SAQ Man. Assist w/ extension and eccentric lower 3 5 Wanda from PT to attain full range extension for first set. SAQ to SLR supine       SLR abduction       SLR       Long sitting hip abduction w/ quad set  2 10 Added 7/13   sidelying hip abduction  2 10 Added 7/13   Prone hip extension  2 10 Added 7/13                                             Therapeutic Activities (08536)                                   Neuromuscular Re-ed (70476) 13'       VMS Burst w/ quad sets Russian stim 22 mhz 8'  Added 7/13   Quad Set Into towel roll  10\" 10    Prone TKE              Manual Intervention (52639) 6'       Scar massage/STM distal IT band, inferior lateral hamstring   6'                       Pt. Education:  -pt educated on diagnosis, prognosis and expectations for rehab  -discussed ACL reconstruction surgery as well as recovery and expectations.   -all pt questions were answered    Home Exercise Program:  Access Code: Dillon Bobby   URL: [email or web address]   Date: 07/09/2020   Prepared by: Eduardo Gaspar     Exercises   Supine Ankle Pumps - 10 reps - 3 sets - 3-5x daily - 7x weekly   Long Sitting Calf Stretch with Strap - 10 reps - 3 sets - 3-5x daily - 7x weekly   Seated Table Hamstring Stretch - 10 reps - 3 sets - 3-5x daily - 7x weekly   Supine Quad Set - 10 reps - 3 sets - 3-5x daily - 7x weekly   Supine Heel Slide with Strap - 10 reps - 3 sets - 3-5x daily - 7x weekly   Seated Knee Flexion AAROM - 10 reps - 3 sets - 3-5x daily - 7x weekly   Supine Knee Flexion AAROM at Wall - 10 reps - 3 sets - 3-5x daily - 7x weekly   Supine Knee Flexion AAROM at Wall - 10 reps - 3 sets - 3-5x daily - 7x weekly     Therapeutic Exercise and NMR EXR  [x] (17852) Provided verbal/tactile cueing for activities related to strengthening, flexibility, endurance, ROM for improvements in LE, proximal hip, and core control with self care, mobility, lifting, ambulation. [x] (77665) Provided verbal/tactile cueing for activities related to improving balance, coordination, kinesthetic sense, posture, motor skill, proprioception  to assist with LE, proximal hip, and core control in self care, mobility, lifting, ambulation and eccentric single leg control.   [] (64660) Therapist is in constant attendance of 2 or more patients providing skilled therapy interventions, but not providing any significant amount of measurable one-on-one time to either patient, for improvements in LE, proximal hip, and core control in self care, mobility, lifting, ambulation and eccentric single leg control.      NMR and Therapeutic Activities:    [x] (58570 or 30046) Provided verbal/tactile cueing for activities related to improving balance, coordination, kinesthetic sense, posture, motor skill, proprioception and motor activation to allow for proper function of core, proximal hip and LE with self care and ADLs  [] (22852) Gait Re-education- Provided training and instruction to the patient for proper LE, core and proximal hip recruitment and positioning and eccentric body weight control with ambulation re-education including up and down stairs     Home Exercise Program:    [x] (53416) Reviewed/Progressed HEP activities related to strengthening, flexibility, endurance, ROM of core, proximal hip and LE for functional self-care, mobility, lifting and ambulation/stair navigation   [] (06792)Reviewed/Progressed HEP activities related to improving balance, coordination, kinesthetic sense, posture, motor skill, proprioception of core, proximal hip and LE for self care, mobility, lifting, and ambulation/stair navigation      Manual Treatments:  PROM / STM / Oscillations-Mobs:  G-I, II, III, IV (PA's, Inf., Post.)  [x] (25632) Provided manual therapy to mobilize LE, proximal hip and/or LS demonstrate increased AROM to at least 0-120 to allow for proper joint functioning as indicated by patients Functional Deficits. []? Progressing: []? Met: []? Not Met: []? Adjusted  3. Patient will demonstrate an increase in Strength to at least 4+/5 quad as well as good proximal hip strength and control to allow for proper functional mobility as indicated by patients Functional Deficits. []? Progressing: []? Met: []? Not Met: []? Adjusted  4. Patient will return to functional activities including walking w/o assistive device without increased symptoms or restriction. []? Progressing: []? Met: []? Not Met: []? Adjusted  5. Patient will be able to return to work full duty without restriction. []? Progressing: []? Met: []? Not Met: []? Adjusted         Overall Progression Towards Functional goals/ Treatment Progress Update:  [] Patient is progressing as expected towards functional goals listed. [] Progression is slowed due to complexities/Impairments listed. [] Progression has been slowed due to co-morbidities. [x] Plan just implemented, too soon to assess goals progression <30days   [] Goals require adjustment due to lack of progress  [] Patient is not progressing as expected and requires additional follow up with physician  [] Other    Persisting Functional Limitations/Impairments:  [x]Sitting [x]Standing   [x]Walking [x]Stairs   [x]Transfers [x]ADLs   [x]Squatting/bending [x]Kneeling  [x]Housework [x]Job related tasks  []Driving [x]Sports/Recreation   [x]Sleeping []Other:    ASSESSMENT: Pt demonstrates poor quad tone and minimal contraction with exercises. Pt demonstrates improved motion this date post manual STM and scar mobilization and discussed with pt importance of scar massage at home. Noted moderate sensitivity and fibrous tissue along anterior/medial scar from surgery during STM this date. Upgraded exercises as tolerated to promote increased LE and hip strength.   Pt performed upgrades without significant pain but with muscle soreness noted and improved quad function during SAQ with eccentric lowering. Continue to upgrade exercises as tolerated per protocol to improve neuromuscular quad function for independent return to ambulation, work and daily functional tasks without risk of re injury and without restrictions. Treatment/Activity Tolerance:  [x] Pt able to complete treatment [] Patient limited by fatique  [] Patient limited by pain  [] Patient limited by other medical complications  [] Other:     Prognosis:  [x] Good [] Fair  [] Poor    Patient Requires Follow-up: [x] Yes  [] No    Return to Play:    [x]  N/A    PLAN: See eval. PT 3x / week for 3 weeks then 2x/week for 9 weeks. [x] Continue per plan of care [] Alter current plan (see comments)  [] Plan of care initiated [] Hold pending MD visit [] Discharge    Electronically signed by: Gerardo Lewis      Note: If patient does not return for scheduled/ recommended follow up visits, this note will serve as a discharge from care along with most recent update on progress.

## 2020-07-15 ENCOUNTER — HOSPITAL ENCOUNTER (OUTPATIENT)
Dept: PHYSICAL THERAPY | Age: 45
Setting detail: THERAPIES SERIES
Discharge: HOME | End: 2020-07-15
Payer: COMMERCIAL

## 2020-07-15 PROCEDURE — 97110 THERAPEUTIC EXERCISES: CPT

## 2020-07-15 PROCEDURE — 97112 NEUROMUSCULAR REEDUCATION: CPT

## 2020-07-15 NOTE — FLOWSHEET NOTE
[facility]  Phone: [phone]   Fax: [phone]      Physical Therapy Treatment Note/ Progress Report:     Date:  7/15/2020    Patient Name:  Moody Ash    :  1975  MRN: [id number]  Restrictions/Precautions:    Medical/Treatment Diagnosis Information:  Diagnosis: S83.512 (ICD-10-CM) - Anterior cruciate ligament complete tear, left  Treatment Diagnosis: L knee pain; L knee stiffness; L LE atrophy; L LE swelling; difficulty walking  Insurance/Certification information:  PT Insurance Information: [employer] - visits per medical necessity; no auth  Physician Information:  Referring Practitioner: Dr. Roopa Pak of care signed (Y/N): []  Yes [x]  No     Date of Patient follow up with Physician:      Progress Report: [x]  Yes  []  No     Date Range for reporting period:  Beginnin2020  Ending:     Progress report due (10 Rx/or 30 days whichever is less): visit #10 or 7/3/8021     Recertification due (POC duration/ or 90 days whichever is less): visit #27 or 10/1/2020 (12 weeks)    2 Insurance Allowable Auth required? Date Range    Medical necessity   &    Not Covered []  Yes  [x]  No n/a     Latex Allergy:  [x]NO      []YES  Preferred Language for Healthcare:   [x]English       []other:    Functional Scale:        Date assessed:  LEFS: raw score = 17/80; dysfunction = 78.75%  2020    Pain level:  2-710     SUBJECTIVE:  Continues to have pain in knee joint and knee feels \"tight\".      OBJECTIVE:     7/15:    PROM AROM     L R L R   Knee Flexion   100 147   Knee Extension Lacking 4   (heel prop after stretching, no OP)  Lacking 4   (poor quad facilitation) 0        RESTRICTIONS/PRECAUTIONS: ACL repair and menisectomy 2020     Exercises/Interventions:     Therapeutic Exercise (81614) 26' Resistance / level Sets/sec Reps Notes / Cues   Calf stretch - long sit w/ towel pull   30\"  5    Long Sitting Hamstring stretch  30\" 5    Ankle Pumps  1 30    Knee flexion PROM - EOB  10\" 10    Knee flexion PROM - heel slide w/ strap  10\" 10       SAQ Man. Assist w/ extension and eccentric lower 3 5 Wanda from PT to attain full range extension for first set. SAQ to SLR supine       SLR       Long sitting hip abduction w/ quad set  2 10 Added 7/13   sidelying hip abduction  2 10 Added 7/13   Prone hip extension  2 10 Added 7/13   Standing HR - brace on npv      Standing HS curls  2 10 Added 7/15   LAQ 90-15 2 10 Added 7/15                        Therapeutic Activities (16619)                                   Neuromuscular Re-ed (17337) 13'       VMS Burst w/ quad sets [place] stim 22 mhz 8'  Added 7/13   Quad Set Into towel roll  10\" 10    Prone TKE       Tandem balance - brace on npv                                  Manual Intervention (87357) 6'       Scar massage/STM distal IT band, inferior lateral hamstring   6'                       Pt. Education:  -pt educated on diagnosis, prognosis and expectations for rehab  -discussed ACL reconstruction surgery as well as recovery and expectations.   -all pt questions were answered    Access Code: [email or web address]   URL: [employer]/   Date: 07/15/2020   Prepared by: Sunny Leong     Exercises   Standing Knee Flexion - 10 reps - 2 sets - 2x daily - 7x weekly   Sidelying Hip Abduction - 10 reps - 2 sets - 2x daily - 7x weekly   Prone Hip Extension - 10 reps - 2 sets - 2x daily - 7x weekly   Seated Long Arc Quad - 10 reps - 2 sets - 2x daily - 7x weekly       Home Exercise Program:  Access Code: [email or web address]   URL: [employer]/   Date: 07/09/2020   Prepared by: Francesca Rodriguez     Exercises   Supine Ankle Pumps - 10 reps - 3 sets - 3-5x daily - 7x weekly   Long Sitting Calf Stretch with Strap - 10 reps - 3 sets - 3-5x daily - 7x weekly   Seated Table Hamstring Stretch - 10 reps - 3 sets - 3-5x daily - 7x weekly   Supine Quad Set - 10 reps - 3 sets - 3-5x daily - 7x weekly   Supine Heel Slide with Strap - 10 reps - 3 sets - 3-5x daily - 7x weekly   Seated Knee Flexion AAROM - 10 reps - 3 sets - 3-5x daily - 7x weekly   Supine Knee Flexion AAROM at Wall - 10 reps - 3 sets - 3-5x daily - 7x weekly   Supine Knee Flexion AAROM at Wall - 10 reps - 3 sets - 3-5x daily - 7x weekly     Therapeutic Exercise and NMR EXR  [x] (83749) Provided verbal/tactile cueing for activities related to strengthening, flexibility, endurance, ROM for improvements in LE, proximal hip, and core control with self care, mobility, lifting, ambulation. [x] (87324) Provided verbal/tactile cueing for activities related to improving balance, coordination, kinesthetic sense, posture, motor skill, proprioception  to assist with LE, proximal hip, and core control in self care, mobility, lifting, ambulation and eccentric single leg control.   [] (34806) Therapist is in constant attendance of 2 or more patients providing skilled therapy interventions, but not providing any significant amount of measurable one-on-one time to either patient, for improvements in LE, proximal hip, and core control in self care, mobility, lifting, ambulation and eccentric single leg control.      NMR and Therapeutic Activities:    [x] (97697 or 43919) Provided verbal/tactile cueing for activities related to improving balance, coordination, kinesthetic sense, posture, motor skill, proprioception and motor activation to allow for proper function of core, proximal hip and LE with self care and ADLs  [] (01480) Gait Re-education- Provided training and instruction to the patient for proper LE, core and proximal hip recruitment and positioning and eccentric body weight control with ambulation re-education including up and down stairs     Home Exercise Program:    [x] (73430) Reviewed/Progressed HEP activities related to strengthening, flexibility, endurance, ROM of core, proximal hip and LE for functional self-care, mobility, lifting and ambulation/stair navigation [] (61467)Reviewed/Progressed HEP activities related to improving balance, coordination, kinesthetic sense, posture, motor skill, proprioception of core, proximal hip and LE for self care, mobility, lifting, and ambulation/stair navigation      Manual Treatments:  PROM / STM / Oscillations-Mobs:  G-I, II, III, IV (PA's, Inf., Post.)  [x] (39153) Provided manual therapy to mobilize LE, proximal hip and/or LS spine soft tissue/joints for the purpose of modulating pain, promoting relaxation,  increasing ROM, reducing/eliminating soft tissue swelling/inflammation/restriction, improving soft tissue extensibility and allowing for proper ROM for normal function with self care, mobility, lifting and ambulation. Modalities:  [] (03592) Vasopneumatic compression: Utilized vasopneumatic compression to decrease edema / swelling for the purpose of improving mobility and quad tone / recruitment which will allow for increased overall function including but not limited to self-care, transfers, ambulation, and ascending / descending stairs. Modalities:       Charges:  Timed Code Treatment Minutes: 45   Total Treatment Minutes: 45     [] EVAL - LOW (05844)   [] EVAL - MOD (03078)  [] EVAL - HIGH (88661)  [] RE-EVAL (14476)  [x] DL(06471) x 2      [] Ionto  [x] NMR (89748) x 1      [] Vaso  [] Manual (77211) x       [] Ultrasound  [] TA x         [] Mech Traction (35419)  [] Aquatic Therapy x      [] ES (un) (70062):   [] Home Management Training x  [] ES(attended) (19821)   [] Dry Needling 1-2 muscles (54329):  [] Dry Needling 3+ muscles (736621  [] Group:      [] Other:     GOALS:  Patient stated goal: improve ROM and return to normal   []? Progressing: []? Met: []? Not Met: []? Adjusted     Therapist goals for Patient:   Short Term Goals: To be achieved in: 2 weeks  1. Independent in HEP and progression per patient tolerance, in order to prevent re-injury. []? Progressing: []? Met: []? Not Met: []? Adjusted  2. Patient will have a decrease in pain to facilitate improvement in movement, function, and ADLs as indicated by Functional Deficits. []? Progressing: []? Met: []? Not Met: []? Adjusted     Long Term Goals: To be achieved in: 12 weeks  1. Disability index score of 20% or less for the LEFS to assist with reaching prior level of function. []? Progressing: []? Met: []? Not Met: []? Adjusted  2. Patient will demonstrate increased AROM to at least 0-120 to allow for proper joint functioning as indicated by patients Functional Deficits. []? Progressing: []? Met: []? Not Met: []? Adjusted  3. Patient will demonstrate an increase in Strength to at least 4+/5 quad as well as good proximal hip strength and control to allow for proper functional mobility as indicated by patients Functional Deficits. []? Progressing: []? Met: []? Not Met: []? Adjusted  4. Patient will return to functional activities including walking w/o assistive device without increased symptoms or restriction. []? Progressing: []? Met: []? Not Met: []? Adjusted  5. Patient will be able to return to work full duty without restriction. []? Progressing: []? Met: []? Not Met: []? Adjusted         Overall Progression Towards Functional goals/ Treatment Progress Update:  [] Patient is progressing as expected towards functional goals listed. [] Progression is slowed due to complexities/Impairments listed. [] Progression has been slowed due to co-morbidities.   [x] Plan just implemented, too soon to assess goals progression <30days   [] Goals require adjustment due to lack of progress  [] Patient is not progressing as expected and requires additional follow up with physician  [] Other    Persisting Functional Limitations/Impairments:  [x]Sitting [x]Standing   [x]Walking [x]Stairs   [x]Transfers [x]ADLs   [x]Squatting/bending [x]Kneeling  [x]Housework [x]Job related tasks  []Driving [x]Sports/Recreation   [x]Sleeping []Other:    ASSESSMENT: Pt continues to have significant pain in knee joint with activity and bending knee. Pt denies pain with exercises this date but was challenged with current exercise program. Continues to have weakness in quad post surgery with limitation in achieving full knee extension and with flexion motion. Continue to upgrade exercises as pt tolerates and is able in order to improve quad function and strength for return to walking independently as well as taking care of kids and performing full daily tasks without restrictions. Treatment/Activity Tolerance:  [x] Pt able to complete treatment [] Patient limited by fatique  [] Patient limited by pain  [] Patient limited by other medical complications  [] Other:     Prognosis:  [x] Good [] Fair  [] Poor    Patient Requires Follow-up: [x] Yes  [] No    Return to Play:    [x]  N/A    PLAN: See [facility]. PT 3x / week for 3 weeks then 2x/week for 9 weeks. [x] Continue per plan of care [] Alter current plan (see comments)  [] Plan of care initiated [] Hold pending MD visit [] Discharge    Electronically signed by: Bin Sanchez      Note: If patient does not return for scheduled/ recommended follow up visits, this note will serve as a discharge from care along with most recent update on progress.

## 2020-07-17 ENCOUNTER — HOSPITAL ENCOUNTER (OUTPATIENT)
Dept: PHYSICAL THERAPY | Age: 45
Setting detail: THERAPIES SERIES
Discharge: HOME | End: 2020-07-17
Payer: COMMERCIAL

## 2020-07-17 PROCEDURE — 97110 THERAPEUTIC EXERCISES: CPT | Performed by: PHYSICAL THERAPIST

## 2020-07-17 PROCEDURE — 97112 NEUROMUSCULAR REEDUCATION: CPT | Performed by: PHYSICAL THERAPIST

## 2020-07-17 PROCEDURE — 97530 THERAPEUTIC ACTIVITIES: CPT | Performed by: PHYSICAL THERAPIST

## 2020-07-17 NOTE — FLOWSHEET NOTE
[facility]  Phone: [phone]   Fax: [phone]      Physical Therapy Treatment Note/ Progress Report:     Date:  2020    Patient Name:  Yong River    :  1975  MRN: [id number]  Restrictions/Precautions:    Medical/Treatment Diagnosis Information:  Diagnosis: S83.512 (ICD-10-CM) - Anterior cruciate ligament complete tear, left  Treatment Diagnosis: L knee pain; L knee stiffness; L LE atrophy; L LE swelling; difficulty walking  Insurance/Certification information:  PT Insurance Information: [employer] - visits per medical necessity; no auth  Physician Information:  Referring Practitioner: Dr. Carlos Rai of care signed (Y/N): []  Yes [x]  No     Date of Patient follow up with Physician:      Progress Report: []  Yes  [x]  No     Date Range for reporting period:  Beginnin2020  Ending:     Progress report due (10 Rx/or 30 days whichever is less): visit #10 or 5623     Recertification due (POC duration/ or 90 days whichever is less): visit #27 or 10/1/2020 (12 weeks)     Insurance Allowable Auth required? Date Range   4 Medical necessity   &    Not Covered []  Yes  [x]  No n/a     Latex Allergy:  [x]NO      []YES  Preferred Language for Healthcare:   [x]English       []other:    Functional Scale:        Date assessed:  LEFS: raw score = 17/80; dysfunction = 78.75%  2020    Pain level:  0/10 rest, 6-7/10 with bending    SUBJECTIVE:  Pt. Reports that her knee feels ok when resting but pain increases significantly with standing. Pt. Notes that she has been ambulating with single crutch since previous therapy session but felt her knee buckle on her a couple of times. Pt. Reports compliance with HEP.      OBJECTIVE:     7/15:    PROM AROM     L R L R   Knee Flexion 100 (with heel slides)  Knee Extension       RESTRICTIONS/PRECAUTIONS: ACL repair and menisectomy 2020     Exercises/Interventions:     Therapeutic Exercise (20322) 24' Resistance / level Sets/sec Reps Notes / Cues   IB - Calf stretch  30\"  5    Long Sitting Hamstring stretch  30\" 5    Ankle Pumps     Knee flexion PROM - EOB     ERMI  npv      Knee flexion PROM - heel slide w/ strap  10\" 10       SAQ Man. Assist w/ extension and eccentric lower Wanda from PT to attain full range extension for first set. SAQ to SLR supine       SLR       Long sitting hip abduction w/ quad set  Added 7/13   sidelying hip abduction  2 10 Added 7/13   Prone hip extension  2 10 Added 7/13   Standing HR - brace on  2 10 Added 7/17   Standing HS curls  2 10 Added 7/15   LAQ 90-30 2 10 Added 7/15   Modified wall sit npv                    Therapeutic Activities (14312) 8'       Mini squats  2 10 Added 7/17 tactile cueing and quad facilitation   Cup walking  2 laps  Added 7/17 verbal and tactile cueing for heel/toe pattern, appropriate knee bend, quad activation with stance                 Neuromuscular Re-ed (29875) 12'       VMS Burst w/ quad sets [place] stim 22 mhz 10' 10\"on/10\"off For quadriceps atrophy and re-education in order to improve quad function and eccentric control which will improve function and QOL. Quad Set Into towel roll  10\" 10    Standing TKE                                          Manual Intervention (55211)                              Pt. Education:  -all pt questions were answered    Access Code: [email or web address]   URL: [employer]/   Date: 07/15/2020   Prepared by: Amanda Musa     Exercises   Standing Knee Flexion - 10 reps - 2 sets - 2x daily - 7x weekly   Sidelying Hip Abduction - 10 reps - 2 sets - 2x daily - 7x weekly   Prone Hip Extension - 10 reps - 2 sets - 2x daily - 7x weekly   Seated Long Arc Quad - 10 reps - 2 sets - 2x daily - 7x weekly       Home Exercise Program:  Access Code: [email or web address]   URL: [employer]/   Date: 07/09/2020   Prepared by: Naomi Frost     Exercises   Supine Ankle Pumps - 10 reps - 3 sets - 3-5x daily - down stairs     Home Exercise Program:    [x] (62814) Reviewed/Progressed HEP activities related to strengthening, flexibility, endurance, ROM of core, proximal hip and LE for functional self-care, mobility, lifting and ambulation/stair navigation   [] (70239)Reviewed/Progressed HEP activities related to improving balance, coordination, kinesthetic sense, posture, motor skill, proprioception of core, proximal hip and LE for self care, mobility, lifting, and ambulation/stair navigation      Manual Treatments:  PROM / STM / Oscillations-Mobs:  G-I, II, III, IV (PA's, Inf., Post.)  [x] (29104) Provided manual therapy to mobilize LE, proximal hip and/or LS spine soft tissue/joints for the purpose of modulating pain, promoting relaxation,  increasing ROM, reducing/eliminating soft tissue swelling/inflammation/restriction, improving soft tissue extensibility and allowing for proper ROM for normal function with self care, mobility, lifting and ambulation. Modalities:  [] (00857) Vasopneumatic compression: Utilized vasopneumatic compression to decrease edema / swelling for the purpose of improving mobility and quad tone / recruitment which will allow for increased overall function including but not limited to self-care, transfers, ambulation, and ascending / descending stairs. Modalities:       Charges:  Timed Code Treatment Minutes: 44   Total Treatment Minutes: 44     [] EVAL - LOW (90373)   [] EVAL - MOD (94991)  [] EVAL - HIGH (33287)  [] RE-EVAL (98257)  [x] DV(35409) x 1      [] Ionto  [x] NMR (78765) x 1      [] Vaso  [] Manual (27674) x       [] Ultrasound  [x] TA x 1        [] Mech Traction (08869)  [] Aquatic Therapy x      [] ES (un) (66529):   [] Home Management Training x  [] ES(attended) (76958)   [] Dry Needling 1-2 muscles (95019):  [] Dry Needling 3+ muscles (195231  [] Group:      [] Other:     GOALS:  Patient stated goal: improve ROM and return to normal   []? Progressing: []? Met: []?  Not Met: []? Adjusted     Therapist goals for Patient:   Short Term Goals: To be achieved in: 2 weeks  1. Independent in HEP and progression per patient tolerance, in order to prevent re-injury. []? Progressing: []? Met: []? Not Met: []? Adjusted  2. Patient will have a decrease in pain to facilitate improvement in movement, function, and ADLs as indicated by Functional Deficits. []? Progressing: []? Met: []? Not Met: []? Adjusted     Long Term Goals: To be achieved in: 12 weeks  1. Disability index score of 20% or less for the LEFS to assist with reaching prior level of function. []? Progressing: []? Met: []? Not Met: []? Adjusted  2. Patient will demonstrate increased AROM to at least 0-120 to allow for proper joint functioning as indicated by patients Functional Deficits. []? Progressing: []? Met: []? Not Met: []? Adjusted  3. Patient will demonstrate an increase in Strength to at least 4+/5 quad as well as good proximal hip strength and control to allow for proper functional mobility as indicated by patients Functional Deficits. []? Progressing: []? Met: []? Not Met: []? Adjusted  4. Patient will return to functional activities including walking w/o assistive device without increased symptoms or restriction. []? Progressing: []? Met: []? Not Met: []? Adjusted  5. Patient will be able to return to work full duty without restriction. []? Progressing: []? Met: []? Not Met: []? Adjusted         Overall Progression Towards Functional goals/ Treatment Progress Update:  [] Patient is progressing as expected towards functional goals listed. [] Progression is slowed due to complexities/Impairments listed. [] Progression has been slowed due to co-morbidities.   [x] Plan just implemented, too soon to assess goals progression <30days   [] Goals require adjustment due to lack of progress  [] Patient is not progressing as expected and requires additional follow up with physician  [] Other    Persisting Functional Limitations/Impairments:  [x]Sitting [x]Standing   [x]Walking [x]Stairs   [x]Transfers [x]ADLs   [x]Squatting/bending [x]Kneeling  [x]Housework [x]Job related tasks  []Driving [x]Sports/Recreation   [x]Sleeping []Other:    ASSESSMENT: Pt. Continues to have pain limiting therapy session. Slight improvement in knee flexion but pain noted at end range. Pt. Continues to have significant deficits in quad control so continued NMES with slight improvement following treatment. Able to complete hip abduction and hip extension SLR with minimal cueing. Added mini squats for closed chain quad activation with tactile facilitation required throughout. Reviewed with pt. Importance of improving quad activation for overall function. Significant cueing required with cup walking to improve gait pattern and quad facilitation with stance phase. Pt. Requires continued progression of post-op protocol with focus at this time on progressing quad activation and ROM to improve functional movements and gait. Treatment/Activity Tolerance:  [x] Pt able to complete treatment [] Patient limited by fatique  [] Patient limited by pain  [] Patient limited by other medical complications  [] Other:     Prognosis:  [x] Good [] Fair  [] Poor    Patient Requires Follow-up: [x] Yes  [] No    Return to Play:    [x]  N/A    PLAN: See eval. PT 3x / week for 3 weeks then 2x/week for 9 weeks. [x] Continue per plan of care [] Alter current plan (see comments)  [] Plan of care initiated [] Hold pending MD visit [] Discharge    Electronically signed by: Cindy Castro PT      Note: If patient does not return for scheduled/ recommended follow up visits, this note will serve as a discharge from care along with most recent update on progress.

## 2020-07-20 ENCOUNTER — HOSPITAL ENCOUNTER (OUTPATIENT)
Dept: PHYSICAL THERAPY | Age: 45
Setting detail: THERAPIES SERIES
Discharge: HOME | End: 2020-07-20
Payer: COMMERCIAL

## 2020-07-20 PROCEDURE — 97110 THERAPEUTIC EXERCISES: CPT

## 2020-07-20 PROCEDURE — 97112 NEUROMUSCULAR REEDUCATION: CPT

## 2020-07-20 NOTE — FLOWSHEET NOTE
Gilda Pearson  Phone: [phone]   Fax: [phone]      Physical Therapy Treatment Note/ Progress Report:     Date:  2020    Patient Name:  Niles Ureña    :  1975  MRN: [id number]  Restrictions/Precautions:    Medical/Treatment Diagnosis Information:  Diagnosis: S83.512 (ICD-10-CM) - Anterior cruciate ligament complete tear, left  Treatment Diagnosis: L knee pain; L knee stiffness; L LE atrophy; L LE swelling; difficulty walking  Insurance/Certification information:  PT Insurance Information: Med [place] - visits per medical necessity; no auth  Physician Information:  Referring Practitioner: Dr. Yessi Yarbrough of care signed (Y/N): []  Yes [x]  No     Date of Patient follow up with Physician:      Progress Report: []  Yes  [x]  No     Date Range for reporting period:  Beginnin2020  Ending:     Progress report due (10 Rx/or 30 days whichever is less): visit #10 or 1435     Recertification due (POC duration/ or 90 days whichever is less): visit #27 or 10/1/2020 (12 weeks)    Visit # Insurance Allowable Auth required? Date Range   5 Medical necessity   &    Not Covered []  Yes  [x]  No n/a     Latex Allergy:  [x]NO      []YES  Preferred Language for Healthcare:   [x]English       []other:    Functional Scale:        Date assessed:  LEFS: raw score = 17/80; dysfunction = 78.75%  2020    Pain level:  0/10 rest, 6-7/10 with bending    SUBJECTIVE:  Pt. Reports that her knee is pain free until she is asked to bend it. States she attempts walking without the brace but her knee hardik. Reports quad is still very atrophied. Pt. Reports compliance with HEP.      OBJECTIVE:     7/15:    PROM AROM     L R L R   Knee Flexion 100 (with heel slides)  Knee Extension       RESTRICTIONS/PRECAUTIONS: ACL repair and menisectomy 2020     Exercises/Interventions:     Therapeutic Exercise (10629) 26' Resistance / level Sets/sec Reps [facility]   URL: [email or web address]   Date: 07/09/2020   Prepared by: Mariana Santos     Exercises   Supine Ankle Pumps - 10 reps - 3 sets - 3-5x daily - 7x weekly   Long Sitting Calf Stretch with Strap - 10 reps - 3 sets - 3-5x daily - 7x weekly   Seated Table Hamstring Stretch - 10 reps - 3 sets - 3-5x daily - 7x weekly   Supine Quad Set - 10 reps - 3 sets - 3-5x daily - 7x weekly   Supine Heel Slide with Strap - 10 reps - 3 sets - 3-5x daily - 7x weekly   Seated Knee Flexion AAROM - 10 reps - 3 sets - 3-5x daily - 7x weekly   Supine Knee Flexion AAROM at Wall - 10 reps - 3 sets - 3-5x daily - 7x weekly   Supine Knee Flexion AAROM at Wall - 10 reps - 3 sets - 3-5x daily - 7x weekly     Therapeutic Exercise and NMR EXR  [x] (53902) Provided verbal/tactile cueing for activities related to strengthening, flexibility, endurance, ROM for improvements in LE, proximal hip, and core control with self care, mobility, lifting, ambulation. [x] (09984) Provided verbal/tactile cueing for activities related to improving balance, coordination, kinesthetic sense, posture, motor skill, proprioception  to assist with LE, proximal hip, and core control in self care, mobility, lifting, ambulation and eccentric single leg control.   [] (43368) Therapist is in constant attendance of 2 or more patients providing skilled therapy interventions, but not providing any significant amount of measurable one-on-one time to either patient, for improvements in LE, proximal hip, and core control in self care, mobility, lifting, ambulation and eccentric single leg control.      NMR and Therapeutic Activities:    [x] (89647 or 32574) Provided verbal/tactile cueing for activities related to improving balance, coordination, kinesthetic sense, posture, motor skill, proprioception and motor activation to allow for proper function of core, proximal hip and LE with self care and ADLs  [] (22888) Gait Re-education- Provided training and instruction to the patient for proper LE, core and proximal hip recruitment and positioning and eccentric body weight control with ambulation re-education including up and down stairs     Home Exercise Program:    [x] (25759) Reviewed/Progressed HEP activities related to strengthening, flexibility, endurance, ROM of core, proximal hip and LE for functional self-care, mobility, lifting and ambulation/stair navigation   [] (40859)Reviewed/Progressed HEP activities related to improving balance, coordination, kinesthetic sense, posture, motor skill, proprioception of core, proximal hip and LE for self care, mobility, lifting, and ambulation/stair navigation      Manual Treatments:  PROM / STM / Oscillations-Mobs:  G-I, II, III, IV (PA's, Inf., Post.)  [x] (53483) Provided manual therapy to mobilize LE, proximal hip and/or LS spine soft tissue/joints for the purpose of modulating pain, promoting relaxation,  increasing ROM, reducing/eliminating soft tissue swelling/inflammation/restriction, improving soft tissue extensibility and allowing for proper ROM for normal function with self care, mobility, lifting and ambulation. Modalities:  [] (94673) Vasopneumatic compression: Utilized vasopneumatic compression to decrease edema / swelling for the purpose of improving mobility and quad tone / recruitment which will allow for increased overall function including but not limited to self-care, transfers, ambulation, and ascending / descending stairs.        Modalities:       Charges:  Timed Code Treatment Minutes: 46   Total Treatment Minutes: 46     [] EVAL - LOW (71023)   [] EVAL - MOD (83861)  [] EVAL - HIGH (88306)  [] RE-EVAL (57450)  [x] HT(71185) x 2      [] Ionto  [x] NMR (25588) x 1      [] Vaso  [] Manual (25546) x       [] Ultrasound  [] TA x 1        [] Mech Traction (81472)  [] Aquatic Therapy x      [] ES (un) (84905):   [] Home Management Training x  [] ES(attended) (06459)   [] Dry Needling 1-2 muscles (21320):  [] Dry Needling 3+ muscles (591116  [] Group:      [] Other:     GOALS:  Patient stated goal: improve ROM and return to normal   []? Progressing: []? Met: []? Not Met: []? Adjusted     Therapist goals for Patient:   Short Term Goals: To be achieved in: 2 weeks  1. Independent in HEP and progression per patient tolerance, in order to prevent re-injury. []? Progressing: []? Met: []? Not Met: []? Adjusted  2. Patient will have a decrease in pain to facilitate improvement in movement, function, and ADLs as indicated by Functional Deficits. []? Progressing: []? Met: []? Not Met: []? Adjusted     Long Term Goals: To be achieved in: 12 weeks  1. Disability index score of 20% or less for the LEFS to assist with reaching prior level of function. []? Progressing: []? Met: []? Not Met: []? Adjusted  2. Patient will demonstrate increased AROM to at least 0-120 to allow for proper joint functioning as indicated by patients Functional Deficits. []? Progressing: []? Met: []? Not Met: []? Adjusted  3. Patient will demonstrate an increase in Strength to at least 4+/5 quad as well as good proximal hip strength and control to allow for proper functional mobility as indicated by patients Functional Deficits. []? Progressing: []? Met: []? Not Met: []? Adjusted  4. Patient will return to functional activities including walking w/o assistive device without increased symptoms or restriction. []? Progressing: []? Met: []? Not Met: []? Adjusted  5. Patient will be able to return to work full duty without restriction. []? Progressing: []? Met: []? Not Met: []? Adjusted         Overall Progression Towards Functional goals/ Treatment Progress Update:  [] Patient is progressing as expected towards functional goals listed. [] Progression is slowed due to complexities/Impairments listed. [] Progression has been slowed due to co-morbidities.   [x] Plan just implemented, too soon to assess goals progression <30days   [] Goals require adjustment due to lack of progress  [] Patient is not progressing as expected and requires additional follow up with physician  [] Other    Persisting Functional Limitations/Impairments:  [x]Sitting [x]Standing   [x]Walking [x]Stairs   [x]Transfers [x]ADLs   [x]Squatting/bending [x]Kneeling  [x]Housework [x]Job related tasks  []Driving [x]Sports/Recreation   [x]Sleeping []Other:    ASSESSMENT: Pt. Continues to have pain and slow movement limiting therapy session. Slight improvement in knee flexion with use of ERMI but pain noted at end range. Pt. Continues to have significant deficits in quad control so continued NMES with slight improvement following treatment. However, increased pain noted this date with LAQ just inferior/lateral to patella. Pt educated on improved quad activation for overall function especially during stance phase of gait. Pt should keep brace donned until she has decent quad activation for TKE during stance phase. Pt. Requires continued progression of post-op protocol with focus at this time on progressing quad activation and ROM to improve functional movements and gait. Treatment/Activity Tolerance:  [] Pt able to complete treatment [] Patient limited by fatique  [x] Patient limited by pain  [] Patient limited by other medical complications  [] Other:     Prognosis:  [x] Good [] Fair  [] Poor    Patient Requires Follow-up: [x] Yes  [] No    Return to Play:    [x]  N/A    PLAN: See [facility]. PT 3x / week for 3 weeks then 2x/week for 9 weeks. [x] Continue per plan of care [] Alter current plan (see comments)  [] Plan of care initiated [] Hold pending MD visit [] Discharge    Electronically signed by: Liz Huertas PT, DPT       Note: If patient does not return for scheduled/ recommended follow up visits, this note will serve as a discharge from care along with most recent update on progress.

## 2020-07-22 ENCOUNTER — HOSPITAL ENCOUNTER (OUTPATIENT)
Dept: PHYSICAL THERAPY | Age: 45
Setting detail: THERAPIES SERIES
Discharge: HOME | End: 2020-07-22
Payer: COMMERCIAL

## 2020-07-22 PROCEDURE — 97112 NEUROMUSCULAR REEDUCATION: CPT | Performed by: PHYSICAL THERAPIST

## 2020-07-22 PROCEDURE — 97110 THERAPEUTIC EXERCISES: CPT | Performed by: PHYSICAL THERAPIST

## 2020-07-22 NOTE — FLOWSHEET NOTE
[facility]  Phone: [phone]   Fax: [phone]      Physical Therapy Treatment Note/ Progress Report:     Date:  2020    Patient Name:  Oskar Springer    :  1975  MRN: [id number]  Restrictions/Precautions:    Medical/Treatment Diagnosis Information:  Diagnosis: S83.512 (ICD-10-CM) - Anterior cruciate ligament complete tear, left  Treatment Diagnosis: L knee pain; L knee stiffness; L LE atrophy; L LE swelling; difficulty walking  Insurance/Certification information:  PT Insurance Information: Med [place] - visits per medical necessity; no auth  Physician Information:  Referring Practitioner: Dr. Marla Kee of care signed (Y/N): []  Yes [x]  No     Date of Patient follow up with Physician:      Progress Report: []  Yes  [x]  No     Date Range for reporting period:  Beginnin2020  Ending:     Progress report due (10 Rx/or 30 days whichever is less): visit #10 or 9135     Recertification due (POC duration/ or 90 days whichever is less): visit #27 or 10/1/2020 (12 weeks)    Visit # Insurance Allowable Auth required? Date Range   6 Medical necessity   &    Not Covered []  Yes  [x]  No n/a     Latex Allergy:  [x]NO      []YES  Preferred Language for Healthcare:   [x]English       []other:    Functional Scale:        Date assessed:  LEFS: raw score = 17/80; dysfunction = 78.75%  2020    Pain level:  0/10 rest, 6-7/10 with bending    SUBJECTIVE:  Pt. Denies pain at this time. Pt. reports that her leg feels a little more swollen today after being up on her feet more today. Pt. Ambulating with single axillary crutch at all times. Pt. Reports compliance with HEP.      OBJECTIVE:     :    PROM AROM     L R L R   Knee Flexion 114 (ERMI)  Knee Extension Lacking 4   (heel prop after stretching, no OP)      RESTRICTIONS/PRECAUTIONS: ACL repair and menisectomy 2020     Exercises/Interventions:     Therapeutic Exercise [employer] - 10 reps - 2 sets - 2x daily - 7x weekly       Home Exercise Program:  Access Code: Violetta Araujo: [email or web address]   Date: 07/09/2020   Prepared by: Ron Jennings     Exercises   Supine Ankle Pumps - 10 reps - 3 sets - 3-5x daily - 7x weekly   Long Sitting Calf Stretch with Strap - 10 reps - 3 sets - 3-5x daily - 7x weekly   Seated Table Hamstring Stretch - 10 reps - 3 sets - 3-5x daily - 7x weekly   Supine Quad Set - 10 reps - 3 sets - 3-5x daily - 7x weekly   Supine Heel Slide with Strap - 10 reps - 3 sets - 3-5x daily - 7x weekly   Seated Knee Flexion AAROM - 10 reps - 3 sets - 3-5x daily - 7x weekly   Supine Knee Flexion AAROM at Wall - 10 reps - 3 sets - 3-5x daily - 7x weekly   Supine Knee Flexion AAROM at Wall - 10 reps - 3 sets - 3-5x daily - 7x weekly     Therapeutic Exercise and NMR EXR  [x] (82757) Provided verbal/tactile cueing for activities related to strengthening, flexibility, endurance, ROM for improvements in LE, proximal hip, and core control with self care, mobility, lifting, ambulation. [x] (56448) Provided verbal/tactile cueing for activities related to improving balance, coordination, kinesthetic sense, posture, motor skill, proprioception  to assist with LE, proximal hip, and core control in self care, mobility, lifting, ambulation and eccentric single leg control.   [] (46796) Therapist is in constant attendance of 2 or more patients providing skilled therapy interventions, but not providing any significant amount of measurable one-on-one time to either patient, for improvements in LE, proximal hip, and core control in self care, mobility, lifting, ambulation and eccentric single leg control.      NMR and Therapeutic Activities:    [x] (40620 or 97974) Provided verbal/tactile cueing for activities related to improving balance, coordination, kinesthetic sense, posture, motor skill, proprioception and motor activation to allow for proper function of core, proximal hip Management Training x  [] ES(attended) (35860)   [] Dry Needling 1-2 muscles (58237):  [] Dry Needling 3+ muscles (059453  [] Group:      [] Other:     GOALS:  Patient stated goal: improve ROM and return to normal   []? Progressing: []? Met: []? Not Met: []? Adjusted     Therapist goals for Patient:   Short Term Goals: To be achieved in: 2 weeks  1. Independent in HEP and progression per patient tolerance, in order to prevent re-injury. []? Progressing: []? Met: []? Not Met: []? Adjusted  2. Patient will have a decrease in pain to facilitate improvement in movement, function, and ADLs as indicated by Functional Deficits. []? Progressing: []? Met: []? Not Met: []? Adjusted     Long Term Goals: To be achieved in: 12 weeks  1. Disability index score of 20% or less for the LEFS to assist with reaching prior level of function. []? Progressing: []? Met: []? Not Met: []? Adjusted  2. Patient will demonstrate increased AROM to at least 0-120 to allow for proper joint functioning as indicated by patients Functional Deficits. []? Progressing: []? Met: []? Not Met: []? Adjusted  3. Patient will demonstrate an increase in Strength to at least 4+/5 quad as well as good proximal hip strength and control to allow for proper functional mobility as indicated by patients Functional Deficits. []? Progressing: []? Met: []? Not Met: []? Adjusted  4. Patient will return to functional activities including walking w/o assistive device without increased symptoms or restriction. []? Progressing: []? Met: []? Not Met: []? Adjusted  5. Patient will be able to return to work full duty without restriction. []? Progressing: []? Met: []? Not Met: []? Adjusted         Overall Progression Towards Functional goals/ Treatment Progress Update:  [] Patient is progressing as expected towards functional goals listed. [] Progression is slowed due to complexities/Impairments listed. [] Progression has been slowed due to co-morbidities.   [x] Plan just implemented, too soon to assess goals progression <30days   [] Goals require adjustment due to lack of progress  [] Patient is not progressing as expected and requires additional follow up with physician  [] Other    Persisting Functional Limitations/Impairments:  [x]Sitting [x]Standing   [x]Walking [x]Stairs   [x]Transfers [x]ADLs   [x]Squatting/bending [x]Kneeling  [x]Housework [x]Job related tasks  []Driving [x]Sports/Recreation   [x]Sleeping []Other:    ASSESSMENT: Pt. Continues to report pain throughout therapy session. Pt. Continues to have deficits in quad activation limiting gait. Pt. Instructed in resting with heel prop for home to promote further extension ROM. Initiated SLR into flexion without brace. Pt. Requires manual assist initially but then able to complete independently, notes pain along incision during exercise. Initiated bike for ROM. Slight improvement in knee flexion ROM on ERMI. Cueing required for equal weight distribution with mini squats. Pt. Requires continued progression of post-op protocol in order to restore functional strength and ROM to improve gait and function. Treatment/Activity Tolerance:  [] Pt able to complete treatment [] Patient limited by fatique  [x] Patient limited by pain  [] Patient limited by other medical complications  [] Other:     Prognosis:  [x] Good [] Fair  [] Poor    Patient Requires Follow-up: [x] Yes  [] No    Return to Play:    [x]  N/A    PLAN: See [facility]. PT 3x / week for 3 weeks then 2x/week for 9 weeks. [x] Continue per plan of care [] Alter current plan (see comments)  [] Plan of care initiated [] Hold pending MD visit [] Discharge    Electronically signed by: Cindy Castro PT, DPT       Note: If patient does not return for scheduled/ recommended follow up visits, this note will serve as a discharge from care along with most recent update on progress.

## 2020-07-24 ENCOUNTER — HOSPITAL ENCOUNTER (OUTPATIENT)
Dept: PHYSICAL THERAPY | Age: 45
Setting detail: THERAPIES SERIES
Discharge: HOME | End: 2020-07-24
Payer: COMMERCIAL

## 2020-07-24 PROCEDURE — 97112 NEUROMUSCULAR REEDUCATION: CPT | Performed by: PHYSICAL THERAPIST

## 2020-07-24 PROCEDURE — 97110 THERAPEUTIC EXERCISES: CPT | Performed by: PHYSICAL THERAPIST

## 2020-07-24 NOTE — FLOWSHEET NOTE
Gilda Pearson  Phone: [phone]   Fax: [phone]      Physical Therapy Treatment Note/ Progress Report:     Date:  2020    Patient Name:  Danyel Mc    :  1975  MRN: [id number]  Restrictions/Precautions:    Medical/Treatment Diagnosis Information:  Diagnosis: S83.512 (ICD-10-CM) - Anterior cruciate ligament complete tear, left  Treatment Diagnosis: L knee pain; L knee stiffness; L LE atrophy; L LE swelling; difficulty walking  Insurance/Certification information:  PT Insurance Information: [employer] [place] - visits per medical necessity; no auth  Physician Information:  Referring Practitioner: Dr. Ciro Nava of care signed (Y/N): []  Yes [x]  No     Date of Patient follow up with Physician:      Progress Report: []  Yes  [x]  No     Date Range for reporting period:  Beginnin2020  Ending:     Progress report due (10 Rx/or 30 days whichever is less): visit #10 or 5434     Recertification due (POC duration/ or 90 days whichever is less): visit #27 or 10/1/2020 (12 weeks)    Visit # Insurance Allowable Auth required? Date Range   7 Medical necessity   &    Not Covered []  Yes  [x]  No n/a     Latex Allergy:  [x]NO      []YES  Preferred Language for Healthcare:   [x]English       []other:    Functional Scale:        Date assessed:  LEFS: raw score = 17/80; dysfunction = 78.75%  2020    Pain level:  0/10 rest    SUBJECTIVE:  Pt. Denies pain in her knee at rest. Pt. States that she continues to have a lot of pain with bending. Pt. Feels that doing the bike last therapy session helped. Pt. Reports compliance with HEP. Pt. Continues to have difficulty with SLR due to pain and weakness.      OBJECTIVE:   :  AD:  [] Bilateral [x] Single [] None  Brace for ambulation: [] No  [x] Yes - Range: unlocked    Mild effusion persisting      PROM AROM     L R L R   Knee Flexion 117 (ERMI)  Knee Extension Lacking 4 resting  -2 [email or web address]   Date: 07/15/2020   Prepared by: Dariel Adan     Exercises   Standing Knee Flexion - 10 reps - 2 sets - 2x daily - 7x weekly   Sidelying Hip Abduction - 10 reps - 2 sets - 2x daily - 7x weekly   Prone Hip Extension - 10 reps - 2 sets - 2x daily - 7x weekly   Seated Long Arc Quad - 10 reps - 2 sets - 2x daily - 7x weekly       Home Exercise Program:  Access Code: [email or web address]   URL: [email or web address]   Date: 07/09/2020   Prepared by: Tammie Kapoor     Exercises   Supine Ankle Pumps - 10 reps - 3 sets - 3-5x daily - 7x weekly   Long Sitting Calf Stretch with Strap - 10 reps - 3 sets - 3-5x daily - 7x weekly   Seated Table Hamstring Stretch - 10 reps - 3 sets - 3-5x daily - 7x weekly   Supine Quad Set - 10 reps - 3 sets - 3-5x daily - 7x weekly   Supine Heel Slide with Strap - 10 reps - 3 sets - 3-5x daily - 7x weekly   Seated Knee Flexion AAROM - 10 reps - 3 sets - 3-5x daily - 7x weekly   Supine Knee Flexion AAROM at Wall - 10 reps - 3 sets - 3-5x daily - 7x weekly   Supine Knee Flexion AAROM at Wall - 10 reps - 3 sets - 3-5x daily - 7x weekly     Therapeutic Exercise and NMR EXR  [x] (22413) Provided verbal/tactile cueing for activities related to strengthening, flexibility, endurance, ROM for improvements in LE, proximal hip, and core control with self care, mobility, lifting, ambulation.   [x] (97542) Provided verbal/tactile cueing for activities related to improving balance, coordination, kinesthetic sense, posture, motor skill, proprioception  to assist with LE, proximal hip, and core control in self care, mobility, lifting, ambulation and eccentric single leg control.   [] (12516) Therapist is in constant attendance of 2 or more patients providing skilled therapy interventions, but not providing any significant amount of measurable one-on-one time to either patient, for improvements in LE, proximal hip, and core control in self care, mobility, lifting, ambulation and eccentric single leg control. NMR and Therapeutic Activities:    [x] (10371 or 98138) Provided verbal/tactile cueing for activities related to improving balance, coordination, kinesthetic sense, posture, motor skill, proprioception and motor activation to allow for proper function of core, proximal hip and LE with self care and ADLs  [] (17628) Gait Re-education- Provided training and instruction to the patient for proper LE, core and proximal hip recruitment and positioning and eccentric body weight control with ambulation re-education including up and down stairs     Home Exercise Program:    [x] (52921) Reviewed/Progressed HEP activities related to strengthening, flexibility, endurance, ROM of core, proximal hip and LE for functional self-care, mobility, lifting and ambulation/stair navigation   [] (00750)Reviewed/Progressed HEP activities related to improving balance, coordination, kinesthetic sense, posture, motor skill, proprioception of core, proximal hip and LE for self care, mobility, lifting, and ambulation/stair navigation      Manual Treatments:  PROM / STM / Oscillations-Mobs:  G-I, II, III, IV (PA's, Inf., Post.)  [x] (82273) Provided manual therapy to mobilize LE, proximal hip and/or LS spine soft tissue/joints for the purpose of modulating pain, promoting relaxation,  increasing ROM, reducing/eliminating soft tissue swelling/inflammation/restriction, improving soft tissue extensibility and allowing for proper ROM for normal function with self care, mobility, lifting and ambulation. Modalities:  [] (97875) Vasopneumatic compression: Utilized vasopneumatic compression to decrease edema / swelling for the purpose of improving mobility and quad tone / recruitment which will allow for increased overall function including but not limited to self-care, transfers, ambulation, and ascending / descending stairs.        Modalities:       Charges:  Timed Code Treatment Minutes: 49   Total Treatment Minutes: 64 [] EVAL - LOW (00652)   [] EVAL - MOD (45377)  [] EVAL - HIGH (18528)  [] RE-EVAL (24758)  [x] CD(34423) x 2      [] Ionto  [x] NMR (15176) x 1      [] Vaso  [] Manual (13020) x       [] Ultrasound  [] TA x 1        [] Mech Traction (76831)  [] Aquatic Therapy x      [] ES (un) (51794):   [] Home Management Training x  [] ES(attended) (52002)   [] Dry Needling 1-2 muscles (44527):  [] Dry Needling 3+ muscles (147959  [] Group:      [] Other:     GOALS:  Patient stated goal: improve ROM and return to normal   []? Progressing: []? Met: []? Not Met: []? Adjusted     Therapist goals for Patient:   Short Term Goals: To be achieved in: 2 weeks  1. Independent in HEP and progression per patient tolerance, in order to prevent re-injury. []? Progressing: []? Met: []? Not Met: []? Adjusted  2. Patient will have a decrease in pain to facilitate improvement in movement, function, and ADLs as indicated by Functional Deficits. []? Progressing: []? Met: []? Not Met: []? Adjusted     Long Term Goals: To be achieved in: 12 weeks  1. Disability index score of 20% or less for the LEFS to assist with reaching prior level of function. []? Progressing: []? Met: []? Not Met: []? Adjusted  2. Patient will demonstrate increased AROM to at least 0-120 to allow for proper joint functioning as indicated by patients Functional Deficits. []? Progressing: []? Met: []? Not Met: []? Adjusted  3. Patient will demonstrate an increase in Strength to at least 4+/5 quad as well as good proximal hip strength and control to allow for proper functional mobility as indicated by patients Functional Deficits. []? Progressing: []? Met: []? Not Met: []? Adjusted  4. Patient will return to functional activities including walking w/o assistive device without increased symptoms or restriction. []? Progressing: []? Met: []? Not Met: []? Adjusted  5. Patient will be able to return to work full duty without restriction. []? Progressing: []?  Met: []? Not Met: []? Adjusted         Overall Progression Towards Functional goals/ Treatment Progress Update:  [] Patient is progressing as expected towards functional goals listed. [] Progression is slowed due to complexities/Impairments listed. [] Progression has been slowed due to co-morbidities. [x] Plan just implemented, too soon to assess goals progression <30days   [] Goals require adjustment due to lack of progress  [] Patient is not progressing as expected and requires additional follow up with physician  [] Other    Persisting Functional Limitations/Impairments:  [x]Sitting [x]Standing   [x]Walking [x]Stairs   [x]Transfers [x]ADLs   [x]Squatting/bending [x]Kneeling  [x]Housework [x]Job related tasks  []Driving [x]Sports/Recreation   [x]Sleeping []Other:    ASSESSMENT: Pt. Tolerated therapy today with minimal complaints. Pt. Is progressing slower than expected post-operatively at this time, possibly due to pain, stiffness, and quad shut down. Pt. Is not safe to ambulate without brace or crutch at this time due to quad weakness and giving way. Pt. Continues to have pain with end range motion into flexion and extension. Slight improvements in ROM following stretching. Initiated additional closed chain strengthening activities with focus on quad activation due to significant deficits persisting at this time. Pt. Merlin Ware and fatigued by session today. Pt. Requires continued progression of post-op protocol in order to restore knee mobility and functional strength to allow for normalized gait. Treatment/Activity Tolerance:  [x] Pt able to complete treatment [] Patient limited by fatique  [] Patient limited by pain  [] Patient limited by other medical complications  [] Other:     Prognosis:  [x] Good [] Fair  [] Poor    Patient Requires Follow-up: [x] Yes  [] No    Return to Play:    [x]  N/A    PLAN: See [facility]. PT 3x / week for 3 weeks then 2x/week for 9 weeks.     [x] Continue per plan of care [] Shona Ennis

## 2020-07-27 ENCOUNTER — HOSPITAL ENCOUNTER (OUTPATIENT)
Dept: PHYSICAL THERAPY | Age: 45
Setting detail: THERAPIES SERIES
Discharge: HOME | End: 2020-07-27
Payer: COMMERCIAL

## 2020-07-27 PROCEDURE — 97110 THERAPEUTIC EXERCISES: CPT

## 2020-07-27 PROCEDURE — 97112 NEUROMUSCULAR REEDUCATION: CPT

## 2020-07-27 RX ORDER — OXYCODONE HYDROCHLORIDE AND ACETAMINOPHEN 5; 325 MG/1; MG/1
1 TABLET ORAL EVERY 6 HOURS PRN
Qty: 28 TABLET | Refills: 0 | Status: SHIPPED | OUTPATIENT
Start: 2020-07-27 | End: 2020-08-10 | Stop reason: SDUPTHER

## 2020-07-29 ENCOUNTER — HOSPITAL ENCOUNTER (OUTPATIENT)
Dept: PHYSICAL THERAPY | Age: 45
Setting detail: THERAPIES SERIES
Discharge: HOME | End: 2020-07-29
Payer: COMMERCIAL

## 2020-07-29 PROCEDURE — 97112 NEUROMUSCULAR REEDUCATION: CPT

## 2020-07-29 PROCEDURE — 97530 THERAPEUTIC ACTIVITIES: CPT

## 2020-07-29 PROCEDURE — 97110 THERAPEUTIC EXERCISES: CPT

## 2020-07-29 NOTE — FLOWSHEET NOTE
Gilda Pearson  Phone: [phone]   Fax: [phone]      Physical Therapy Treatment Note/ Progress Report:     Date:  2020    Patient Name:  Dennis Vazquez    :  1975  MRN: [id number]  Restrictions/Precautions:    Medical/Treatment Diagnosis Information:  Diagnosis: S83.512 (ICD-10-CM) - Anterior cruciate ligament complete tear, left  Treatment Diagnosis: L knee pain; L knee stiffness; L LE atrophy; L LE swelling; difficulty walking  Insurance/Certification information:  PT Insurance Information: [employer] [place] - visits per medical necessity; no auth  Physician Information:  Referring Practitioner: Dr. Gillian Veronica of care signed (Y/N): []  Yes [x]  No     Date of Patient follow up with Physician:      Progress Report: []  Yes  [x]  No     Date Range for reporting period:  Beginnin2020  Ending:     Progress report due (10 Rx/or 30 days whichever is less): visit #10 or      Recertification due (POC duration/ or 90 days whichever is less): visit #27 or 10/1/2020 (12 weeks)    Visit # Insurance Allowable Auth required? Date Range   9 Medical necessity   &    Not Covered []  Yes  [x]  No n/a     Latex Allergy:  [x]NO      []YES  Preferred Language for Healthcare:   [x]English       []other:    Functional Scale:        Date assessed:  LEFS: raw score = 17/80; dysfunction = 78.75%  2020    Pain level:  0/10 rest    SUBJECTIVE:  Pt reports she mostly feels tightness and pain with her exercises and stretching. Doesn't really feel the quad \"working\" during her LE extension exercises.      OBJECTIVE:   :  AD:  [] Bilateral [x] Single [] None  Brace for ambulation: [] No  [x] Yes - Range: unlocked    Mild effusion persisting      PROM AROM     L R L R   Knee Flexion 117 (ERMI)  Knee Extension Lacking 4 resting  -2 man OP       RESTRICTIONS/PRECAUTIONS: ACL repair and menisectomy 2020     Exercises/Interventions: Therapeutic Exercise (28121) 35' Resistance / level Sets/sec Reps Notes / Cues   Bike (seat position 4) ROM 5'     IB - Calf stretch  30\"  5    Long Sitting Hamstring stretch  30\" 5    Prone quad stretch  30\" 3 Added 7/27   ERMI   20\" 5           sidelying hip abduction  2 10 Added 7/13   bridge  5\" 15 Added 7/24   Standing HR   2 10 Added 7/17   Standing HS curls 3# 2 10 ^7/24   LAQ (90-30) 3# 2 10 ^7/24   Leg press (90-10) DL 60# 2 10 Start 7/24   Modified wall sit ~70deg 20\" 2 Start 7/24          Therapeutic Activities (43559) 8'       Mini squats  2 10 Added 7/17 tactile cueing and quad facilitation       Added 7/17 verbal and tactile cueing for heel/toe pattern, appropriate knee bend, quad activation with stance   Heel Toe Gait through clinic Single crutch 3'  Verbal and tactile cueing for heel IC and toe push off with quad activation for TKE during L LE stance phase    SL sit to stand To table 1 10 Added 7/29 cueing throughout for control          Neuromuscular Re-ed (97262) 12'       For quadriceps atrophy and re-education in order to improve quad function and eccentric control which will improve function and QOL. Quad Set Into towel roll  5\" 5       SAQ Man. Assist w/ extension and eccentric lower 2 5 Wanda from PT to attain full range extension for first set. SAQ to SLR supine  2 5 Added 7/29 - Min quad lag present from SAQ, able to maintain and not lose any extension with addition of SLR    SLR - flexion (semi-reclined)  Manual assist for initiation, cueing throughout to decrease extensor lag                               Manual Intervention (10203)       Manual OP into extension                         Pt. Education:  -all pt questions were answered    Access Code: [email or web address]   URL: [email or web address]   Date: 07/15/2020   Prepared by: Montserrat Nam     Exercises   Standing Knee Flexion - 10 reps - 2 sets - 2x daily - 7x weekly   Sidelying Hip Abduction - 10 reps - 2 sets - 2x daily - 7x weekly   Prone Hip Extension - 10 reps - 2 sets - 2x daily - 7x weekly   Seated Long Arc Quad - 10 reps - 2 sets - 2x daily - 7x weekly       Home Exercise Program:  Access Code: [email or web address]   URL: [email or web address]   Date: 07/09/2020   Prepared by: Kasia Mabry     Exercises   Supine Ankle Pumps - 10 reps - 3 sets - 3-5x daily - 7x weekly   Long Sitting Calf Stretch with Strap - 10 reps - 3 sets - 3-5x daily - 7x weekly   Seated Table Hamstring Stretch - 10 reps - 3 sets - 3-5x daily - 7x weekly   Supine Quad Set - 10 reps - 3 sets - 3-5x daily - 7x weekly   Supine Heel Slide with Strap - 10 reps - 3 sets - 3-5x daily - 7x weekly   Seated Knee Flexion AAROM - 10 reps - 3 sets - 3-5x daily - 7x weekly   Supine Knee Flexion AAROM at Wall - 10 reps - 3 sets - 3-5x daily - 7x weekly   Supine Knee Flexion AAROM at Wall - 10 reps - 3 sets - 3-5x daily - 7x weekly     Therapeutic Exercise and NMR EXR  [x] (18451) Provided verbal/tactile cueing for activities related to strengthening, flexibility, endurance, ROM for improvements in LE, proximal hip, and core control with self care, mobility, lifting, ambulation. [x] (03547) Provided verbal/tactile cueing for activities related to improving balance, coordination, kinesthetic sense, posture, motor skill, proprioception  to assist with LE, proximal hip, and core control in self care, mobility, lifting, ambulation and eccentric single leg control.   [] (92901) Therapist is in constant attendance of 2 or more patients providing skilled therapy interventions, but not providing any significant amount of measurable one-on-one time to either patient, for improvements in LE, proximal hip, and core control in self care, mobility, lifting, ambulation and eccentric single leg control.      NMR and Therapeutic Activities:    [x] (16241 or 60655) Provided verbal/tactile cueing for activities related to improving balance, coordination, kinesthetic sense, posture, motor skill, [] [facility] Traction (11082)  [] Aquatic Therapy x      [] ES (un) (59422):   [] Home Management Training x  [] ES(attended) (26442)   [] Dry Needling 1-2 muscles (47654):  [] Dry Needling 3+ muscles (235547  [] Group:      [] Other:     GOALS:  Patient stated goal: improve ROM and return to normal   []? Progressing: []? Met: []? Not Met: []? Adjusted     Therapist goals for Patient:   Short Term Goals: To be achieved in: 2 weeks  1. Independent in HEP and progression per patient tolerance, in order to prevent re-injury. []? Progressing: []? Met: []? Not Met: []? Adjusted  2. Patient will have a decrease in pain to facilitate improvement in movement, function, and ADLs as indicated by Functional Deficits. []? Progressing: []? Met: []? Not Met: []? Adjusted     Long Term Goals: To be achieved in: 12 weeks  1. Disability index score of 20% or less for the LEFS to assist with reaching prior level of function. []? Progressing: []? Met: []? Not Met: []? Adjusted  2. Patient will demonstrate increased AROM to at least 0-120 to allow for proper joint functioning as indicated by patients Functional Deficits. []? Progressing: []? Met: []? Not Met: []? Adjusted  3. Patient will demonstrate an increase in Strength to at least 4+/5 quad as well as good proximal hip strength and control to allow for proper functional mobility as indicated by patients Functional Deficits. []? Progressing: []? Met: []? Not Met: []? Adjusted  4. Patient will return to functional activities including walking w/o assistive device without increased symptoms or restriction. []? Progressing: []? Met: []? Not Met: []? Adjusted  5. Patient will be able to return to work full duty without restriction. []? Progressing: []? Met: []? Not Met: []? Adjusted         Overall Progression Towards Functional goals/ Treatment Progress Update:  [] Patient is progressing as expected towards functional goals listed.     [] Progression is slowed due to progress.

## 2020-07-31 ENCOUNTER — HOSPITAL ENCOUNTER (OUTPATIENT)
Dept: PHYSICAL THERAPY | Age: 45
Setting detail: THERAPIES SERIES
Discharge: HOME | End: 2020-07-31
Payer: COMMERCIAL

## 2020-07-31 PROCEDURE — 97110 THERAPEUTIC EXERCISES: CPT

## 2020-07-31 PROCEDURE — 97112 NEUROMUSCULAR REEDUCATION: CPT

## 2020-07-31 PROCEDURE — 97530 THERAPEUTIC ACTIVITIES: CPT

## 2020-07-31 NOTE — FLOWSHEET NOTE
persisting    Negative anterior drawer and Lachman's     Strength deferred on L LE due to pain      PROM AROM     L R L R   Knee Flexion 118 (ERMI)  110    Knee Extension Lacking 2 resting  0 man OP  Lacking 2  (poor quad facilitation)         RESTRICTIONS/PRECAUTIONS: ACL repair and menisectomy 6/4/2020     Exercises/Interventions:     Therapeutic Exercise (29162) 27' Resistance / level Sets/sec Reps Notes / Cues   Bike (seat position 4) ROM 5'     IB - Calf stretch  30\"  4    Long Sitting Hamstring stretch  30\" 4    Prone quad stretch  30\" 3 Added 7/27   ERMI   20\" 5           sidelying hip abduction  Added 7/13   bridge  Added 7/24   Standing HR   2 10 Added 7/17   Standing HS curls  2 10 ^7/31 weight held due to pain levels   LAQ (90-30)  2 10 ^7/31 weight held due to pain levels    Leg press (90-10) DL 60# 2 10 Start 7/24   Modified wall sit Start 7/24          Therapeutic Activities (30165) 8'       Mini squats  2 10 Added 7/17 tactile cueing and quad facilitation       Added 7/17 verbal and tactile cueing for heel/toe pattern, appropriate knee bend, quad activation with stance   Heel Toe Gait through clinic  Verbal and tactile cueing for heel IC and toe push off with quad activation for TKE during L LE stance phase    SL sit to stand Added 7/29 cueing throughout for control; 7/31 held due to pain levels, unable to complete   DL sit to stand - performed with B ER via man resist of lateral knee To table 1 10 Cueing to avoid femoral ADD/IR by placing PT hands on lateral aspect of knee and cueing for ER          Neuromuscular Re-ed (50002) 10'       For quadriceps atrophy and re-education in order to improve quad function and eccentric control which will improve function and QOL. Quad Set Into towel roll  5\" 5       SAQ Man. Assist w/ extension and eccentric lower 2 5 Wanda from PT to attain full range extension for first set.    SAQ to SLR supine  Added 7/29 - Min quad lag present from SAQ, able to maintain and to assist with LE, proximal hip, and core control in self care, mobility, lifting, ambulation and eccentric single leg control.   [] (80900) Therapist is in constant attendance of 2 or more patients providing skilled therapy interventions, but not providing any significant amount of measurable one-on-one time to either patient, for improvements in LE, proximal hip, and core control in self care, mobility, lifting, ambulation and eccentric single leg control.      NMR and Therapeutic Activities:    [x] (02584 or 20624) Provided verbal/tactile cueing for activities related to improving balance, coordination, kinesthetic sense, posture, motor skill, proprioception and motor activation to allow for proper function of core, proximal hip and LE with self care and ADLs  [] (03113) Gait Re-education- Provided training and instruction to the patient for proper LE, core and proximal hip recruitment and positioning and eccentric body weight control with ambulation re-education including up and down stairs     Home Exercise Program:    [x] (88939) Reviewed/Progressed HEP activities related to strengthening, flexibility, endurance, ROM of core, proximal hip and LE for functional self-care, mobility, lifting and ambulation/stair navigation   [] (65561)Reviewed/Progressed HEP activities related to improving balance, coordination, kinesthetic sense, posture, motor skill, proprioception of core, proximal hip and LE for self care, mobility, lifting, and ambulation/stair navigation      Manual Treatments:  PROM / STM / Oscillations-Mobs:  G-I, II, III, IV (PA's, Inf., Post.)  [x] (86893) Provided manual therapy to mobilize LE, proximal hip and/or LS spine soft tissue/joints for the purpose of modulating pain, promoting relaxation,  increasing ROM, reducing/eliminating soft tissue swelling/inflammation/restriction, improving soft tissue extensibility and allowing for proper ROM for normal function with self care, mobility, lifting and ambulation. Modalities:  [] (48197) Vasopneumatic compression: Utilized vasopneumatic compression to decrease edema / swelling for the purpose of improving mobility and quad tone / recruitment which will allow for increased overall function including but not limited to self-care, transfers, ambulation, and ascending / descending stairs. Modalities:       Charges:  Timed Code Treatment Minutes: 45   Total Treatment Minutes: 45     [] EVAL - LOW (28174)   [] EVAL - MOD (59047)  [] EVAL - HIGH (49170)  [] RE-EVAL (27764)  [x] WG(69560) x 2      [] Ionto  [x] NMR (53168) x 1      [] Vaso  [] Manual (29566) x       [] Ultrasound  [x] TA x 1        [] Mech Traction (93637)  [] Aquatic Therapy x      [] ES (un) (02333):   [] Home Management Training x  [] ES(attended) (67178)   [] Dry Needling 1-2 muscles (23477):  [] Dry Needling 3+ muscles (188968  [] Group:      [] Other:     GOALS:  Patient stated goal: improve ROM and return to normal   [x]? Progressing: []? Met: []? Not Met: []? Adjusted     Therapist goals for Patient:   Short Term Goals: To be achieved in: 2 weeks  1. Independent in HEP and progression per patient tolerance, in order to prevent re-injury. []? Progressing: [x]? Met: []? Not Met: []? Adjusted  2. Patient will have a decrease in pain to facilitate improvement in movement, function, and ADLs as indicated by Functional Deficits. [x]? Progressing: []? Met: []? Not Met: []? Adjusted     Long Term Goals: To be achieved in: 12 weeks  1. Disability index score of 20% or less for the LEFS to assist with reaching prior level of function. []? Progressing: []? Met: [x]? Not Met: []? Adjusted  2. Patient will demonstrate increased AROM to at least 0-120 to allow for proper joint functioning as indicated by patients Functional Deficits. [x]? Progressing: []? Met: []? Not Met: []? Adjusted  3.  Patient will demonstrate an increase in Strength to at least 4+/5 quad as well as good proximal hip strength and control to allow for proper functional mobility as indicated by patients Functional Deficits. [x]? Progressing: []? Met: []? Not Met: []? Adjusted  4. Patient will return to functional activities including walking w/o assistive device without increased symptoms or restriction. [x]? Progressing: []? Met: []? Not Met: []? Adjusted  5. Patient will be able to return to work full duty without restriction. [x]? Progressing: []? Met: []? Not Met: []? Adjusted         Overall Progression Towards Functional goals/ Treatment Progress Update:  [] Patient is progressing as expected towards functional goals listed. [] Progression is slowed due to complexities/Impairments listed. [] Progression has been slowed due to co-morbidities. [x] Plan just implemented, too soon to assess goals progression <30days   [] Goals require adjustment due to lack of progress  [] Patient is not progressing as expected and requires additional follow up with physician  [] Other    Persisting Functional Limitations/Impairments:  [x]Sitting [x]Standing   [x]Walking [x]Stairs   [x]Transfers [x]ADLs   [x]Squatting/bending [x]Kneeling  [x]Housework [x]Job related tasks  []Driving [x]Sports/Recreation   [x]Sleeping []Other:    ASSESSMENT:  Pt progressed with exercises as outlined in bold above. Exercise progressions focused on LE alignment, glute med activation, and quad facilitation to assist with improved gait mechanics. Pt demo'ing improved AROM with quad work compared to eval, but remains painful and does not attain full extension. Pt remains slow with activity requiring increased time for performance. Pt. Requires cueing for equal weight shift with closed chain activities such as wall sits and leg press. Regressed to DL sit to stand from table for focused glute med and quad strengthening (unable to form SL . Pt. Challenged significantly with eccentric control for lowering. Pt cont to benefit from skilled PT. Treatment/Activity Tolerance:  [] Pt able to complete treatment [] Patient limited by fatique  [x] Patient limited by pain  [] Patient limited by other medical complications  [] Other:     Prognosis:  [x] Good [] Fair  [] Poor    Patient Requires Follow-up: [x] Yes  [] No    Return to Play:    [x]  N/A    PLAN: See eval. PT 3x / week for 3 weeks then 2x/week for 9 weeks. [x] Continue per plan of care [] Alter current plan (see comments)  [] Plan of care initiated [] Hold pending MD visit [] Discharge    Electronically signed by: Shmuel Garcia PT, DPT      Note: If patient does not return for scheduled/ recommended follow up visits, this note will serve as a discharge from care along with most recent update on progress.

## 2020-08-03 ENCOUNTER — OFFICE VISIT (OUTPATIENT)
Dept: ORTHOPEDIC SURGERY | Age: 45
End: 2020-08-03

## 2020-08-03 VITALS — HEIGHT: 63 IN | WEIGHT: 140 LBS | BODY MASS INDEX: 24.8 KG/M2 | TEMPERATURE: 98.4 F

## 2020-08-03 PROCEDURE — 99024 POSTOP FOLLOW-UP VISIT: CPT | Performed by: PHYSICIAN ASSISTANT

## 2020-08-03 RX ORDER — METHYLPREDNISOLONE 4 MG/1
TABLET ORAL
Qty: 1 KIT | Refills: 0 | Status: SHIPPED | OUTPATIENT
Start: 2020-08-03 | End: 2020-09-15 | Stop reason: ALTCHOICE

## 2020-08-03 NOTE — PROGRESS NOTES
Patient Name: Gulshan Rich  Medical Record Number: [id number]  YOB: 1975  Date of Encounter: 8/3/2020     Chief Complaint   Patient presents with    Post-Op Check     Left ACL Reconstruction and medial meniscus repair 6/4/2020. History of Present Illness:   Ms. Gulshan Rich is here in 8+ week follow up regarding her left ACL reconstruction with medial meniscus repair on 6/4/2020. Patient is still struggling with pain, range of motion and strength. We received a message from physical therapy stating she is progressing slower than expected. She is still wearing her T scope brace and using 1 crutch. Patient states last week her left knee \"buckled\" when she was trying to ambulate without her brace or crutch. She states most of her pain is along the anterior knee just inferior to the patella. She has pain with light touch of the skin. The patient's past medical history, medications, allergies, family history, social history, and review of systems have been reviewed, and dated and are recorded in the chart under the 'MEDIA\" tab. Physical Exam:    Ms. Gulshan Rich appears well, she is in no apparent distress, she demonstrates appropriate mood & affect. She is alert and oriented to person, place and time. Temp 98.4 °F (36.9 °C) (Infrared)   Ht 5' 3\" (1.6 m)   Wt 140 lb (63.5 kg)   BMI 24.80 kg/m²     On examination of patient's left knee there is no significant swelling or joint effusion. Patient has tenderness on palpation of the anterior knee just below the patella. She even has pain with light touch of the skin. Her surgical incision is well-healed. She is able to passively get to 0 degrees of extension but has a lot of difficulty with active knee extension. She has knee flexion to 110 degrees. There is no lower extremity edema or signs of DVT.       Radiology:  X-rays obtained and reviewed in office:   Views: 2 view left knee including AP and lateral  Impression: Patient is status post ACL reconstruction with BTB autograft. Graft is in appropriate position. Orders:  Orders Placed This Encounter   Procedures    XR KNEE LEFT (1-2 VIEWS)       Impression:   Diagnosis Orders   1. 6/4/20 LEFT ACL reconstruction   XR KNEE LEFT (1-2 VIEWS)    methylPREDNISolone (MEDROL, ESTELITA,) 4 MG tablet    Diclofenac Sodium POWD   2. 6/4/20 LEFT medial meniscus repair  methylPREDNISolone (MEDROL, ESTELITA,) 4 MG tablet    Diclofenac Sodium POWD       Treatment Plan:    Patient is over 8 weeks postop. She is not doing well regarding her pain, range of motion and strength. She is still wearing her T scope brace and using 1 crutch because of her poor quadriceps function. On Friday, she attempted to ambulate without her brace and crutch and states her left knee buckled. Patient even has pain with light touch of the skin over her anterior knee. Her surgical incision is well-healed. There is minimal swelling or joint effusion. There is no erythema, induration or warmth to suggest infectious process. Patient will continue working with physical therapy as well as continue her home exercises and stretches. She will be given a prescription for a Medrol Dosepak as well as biomed cream.  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 She will be given an additional 4 weeks off of work. Patient will plan on following back in 3 weeks or before that time with any concerns. Joleen Perez was informed of the results of any imaging. We discussed treatment options and a time was given to answer questions. A plan was proposed and Joleen Perez understand and accepts this course of care. Patient was also seen and evaluated by Dr. Yayo Prieto. He is in agreement with my assessment and together we formulated a plan for the patient.           Electronically signed by Tommi Shone PA-C on 6/0/2006  Board Certified [facility]    Please note that

## 2020-08-04 ENCOUNTER — HOSPITAL ENCOUNTER (OUTPATIENT)
Dept: PHYSICAL THERAPY | Age: 45
Setting detail: THERAPIES SERIES
Discharge: HOME | End: 2020-08-04
Payer: COMMERCIAL

## 2020-08-04 NOTE — TELEPHONE ENCOUNTER
Called patient to check on functional knee brace. States that she is not in it yet because he quad is not firing. Advised I will call back in 1-2 months.

## 2020-08-04 NOTE — PROGRESS NOTES
Physical Therapy  Cancellation/No-show Note  Patient Name:  Bryon Stewart  :  1975   Date:  2020  Cancelled visits to date: 1  No-shows to date: 0    Patient status for today's appointment patient:  [x]  Cancelled   []  Rescheduled appointment  []  No-show     Reason given by patient:  [x]  Patient ill  []  Conflicting appointment  []  No transportation    []  Conflict with work  []  No reason given  []  Other:     Comments:      Phone call information:   []  Phone call made today to patient at _ time at number provided:      []  Patient answered, conversation as follows:    []  Patient did not answer, message left as follows:  []  Phone call not made today  []  Phone call not made today - patient called in and provided reason for cancellation    Electronically signed by:  Alina Sterling, PT, DPT

## 2020-08-05 ENCOUNTER — TELEPHONE (OUTPATIENT)
Dept: ORTHOPEDIC SURGERY | Age: 45
End: 2020-08-05

## 2020-08-07 ENCOUNTER — HOSPITAL ENCOUNTER (OUTPATIENT)
Dept: PHYSICAL THERAPY | Age: 45
Setting detail: THERAPIES SERIES
Discharge: HOME | End: 2020-08-07
Payer: COMMERCIAL

## 2020-08-07 PROCEDURE — 97110 THERAPEUTIC EXERCISES: CPT | Performed by: PHYSICAL THERAPIST

## 2020-08-07 PROCEDURE — 97112 NEUROMUSCULAR REEDUCATION: CPT | Performed by: PHYSICAL THERAPIST

## 2020-08-07 NOTE — FLOWSHEET NOTE
[facility]  Phone: [phone]   Fax: [phone]      Physical Therapy Treatment Note/ Progress Report:     Date:  2020    Patient Name:  Ever Romano    :  1975  MRN: [id number]  Restrictions/Precautions:    Medical/Treatment Diagnosis Information:  Diagnosis: S83.512 (ICD-10-CM) - Anterior cruciate ligament complete tear, left  Treatment Diagnosis: L knee pain; L knee stiffness; L LE atrophy; L LE swelling; difficulty walking  Insurance/Certification information:  PT Insurance Information: [employer] - visits per medical necessity; no auth  Physician Information:  Referring Practitioner: Dr. Neo Woodson of care signed (Y/N): [x]  Yes []  No     Date of Patient follow up with Physician: 8/3/2020     Progress Report: []  Yes  [x]  No     Date Range for reporting period:  Beginnin2020  Endin2020    Progress report due (10 Rx/or 30 days whichever is less): visit #20 or      Recertification due (POC duration/ or 90 days whichever is less): visit #27 or 10/1/2020 (12 weeks)    Visit # Insurance Allowable Auth required? Date Range   11 Medical necessity   &    Not Covered []  Yes  [x]  No n/a     Latex Allergy:  [x]NO      []YES  Preferred Language for Healthcare:   [x]English       []other:    Functional Scale:        Date assessed:  LEFS: raw score = 20/80; dysfunction = 75%  2020    Pain level:  4-5/10    SUBJECTIVE: pt. Reports that her knee has been more sore over the past week. She has had to go back to taking some of the pain medication. Pt. Notes that it was really bad Monday evening and Tuesday including throbbing overnight. Pt. Notes that she swelling feels a little worse this week. Pt. Is ambulating without AD around the house and short distances. Pt. Reports compliance with some of HEP.      OBJECTIVE:   :  AD:  [] Bilateral [x] Single - longer distances [x] None - controlled assist with LE, proximal hip, and core control in self care, mobility, lifting, ambulation and eccentric single leg control.   [] (74727) Therapist is in constant attendance of 2 or more patients providing skilled therapy interventions, but not providing any significant amount of measurable one-on-one time to either patient, for improvements in LE, proximal hip, and core control in self care, mobility, lifting, ambulation and eccentric single leg control.      NMR and Therapeutic Activities:    [x] (25259 or 48245) Provided verbal/tactile cueing for activities related to improving balance, coordination, kinesthetic sense, posture, motor skill, proprioception and motor activation to allow for proper function of core, proximal hip and LE with self care and ADLs  [] (11038) Gait Re-education- Provided training and instruction to the patient for proper LE, core and proximal hip recruitment and positioning and eccentric body weight control with ambulation re-education including up and down stairs     Home Exercise Program:    [x] (59947) Reviewed/Progressed HEP activities related to strengthening, flexibility, endurance, ROM of core, proximal hip and LE for functional self-care, mobility, lifting and ambulation/stair navigation   [] (08083)Reviewed/Progressed HEP activities related to improving balance, coordination, kinesthetic sense, posture, motor skill, proprioception of core, proximal hip and LE for self care, mobility, lifting, and ambulation/stair navigation      Manual Treatments:  PROM / STM / Oscillations-Mobs:  G-I, II, III, IV (PA's, Inf., Post.)  [x] (94266) Provided manual therapy to mobilize LE, proximal hip and/or LS spine soft tissue/joints for the purpose of modulating pain, promoting relaxation,  increasing ROM, reducing/eliminating soft tissue swelling/inflammation/restriction, improving soft tissue extensibility and allowing for proper ROM for normal function with self care, mobility, lifting and ambulation. Modalities:  [] (52199) Vasopneumatic compression: Utilized vasopneumatic compression to decrease edema / swelling for the purpose of improving mobility and quad tone / recruitment which will allow for increased overall function including but not limited to self-care, transfers, ambulation, and ascending / descending stairs. Modalities:       Charges:  Timed Code Treatment Minutes: 47   Total Treatment Minutes: 50     [] EVAL - LOW (99684)   [] EVAL - MOD (78259)  [] EVAL - HIGH (52286)  [] RE-EVAL (38727)  [x] IN(25473) x 2      [] Ionto  [x] NMR (08344) x 1      [] Vaso  [] Manual (06114) x       [] Ultrasound  [] TA x         [] Mech Traction (49444)  [] Aquatic Therapy x      [] ES (un) (78329):   [] Home Management Training x  [] ES(attended) (93373)   [] Dry Needling 1-2 muscles (18920):  [] Dry Needling 3+ muscles (991289  [] Group:      [] Other:     GOALS:  Patient stated goal: improve ROM and return to normal   [x]? Progressing: []? Met: []? Not Met: []? Adjusted     Therapist goals for Patient:   Short Term Goals: To be achieved in: 2 weeks  1. Independent in HEP and progression per patient tolerance, in order to prevent re-injury. []? Progressing: [x]? Met: []? Not Met: []? Adjusted  2. Patient will have a decrease in pain to facilitate improvement in movement, function, and ADLs as indicated by Functional Deficits. [x]? Progressing: []? Met: []? Not Met: []? Adjusted     Long Term Goals: To be achieved in: 12 weeks  1. Disability index score of 20% or less for the LEFS to assist with reaching prior level of function. []? Progressing: []? Met: [x]? Not Met: []? Adjusted  2. Patient will demonstrate increased AROM to at least 0-120 to allow for proper joint functioning as indicated by patients Functional Deficits. [x]? Progressing: []? Met: []? Not Met: []? Adjusted  3.  Patient will demonstrate an increase in Strength to at least 4+/5 quad as well as good proximal hip strength and control to allow for proper functional mobility as indicated by patients Functional Deficits. [x]? Progressing: []? Met: []? Not Met: []? Adjusted  4. Patient will return to functional activities including walking w/o assistive device without increased symptoms or restriction. [x]? Progressing: []? Met: []? Not Met: []? Adjusted  5. Patient will be able to return to work full duty without restriction. [x]? Progressing: []? Met: []? Not Met: []? Adjusted         Overall Progression Towards Functional goals/ Treatment Progress Update:  [] Patient is progressing as expected towards functional goals listed. [x] Progression is slowed due to complexities/Impairments listed. [] Progression has been slowed due to co-morbidities. [] Plan just implemented, too soon to assess goals progression <30days   [] Goals require adjustment due to lack of progress  [] Patient is not progressing as expected and requires additional follow up with physician  [] Other    Persisting Functional Limitations/Impairments:  [x]Sitting [x]Standing   [x]Walking [x]Stairs   [x]Transfers [x]ADLs   [x]Squatting/bending [x]Kneeling  [x]Housework [x]Job related tasks  []Driving [x]Sports/Recreation   [x]Sleeping []Other:    ASSESSMENT:  Pt. Continues to be limited by significant pain and deficits in quad activation. Unable to perform SLR this date due to significant infrapatellar pain. Performed SLR holds with assist for quad activation. Pt. Demonstrates improvements in knee flexion on ERMI this date. Initiated low step ups with tactile quad facilitation throughout. Pt. Continues to require cueing and encouragement throughout session as pt. Is discouraged by slow progress. Pt. Requires continued progression of skilled therapy in order to restore functional strength and decrease pain.      Treatment/Activity Tolerance:  [] Pt able to complete treatment [] Patient limited by fatique  [x] Patient limited by pain  [] Patient limited by other medical complications  [] Other:     Prognosis:  [x] Good [] Fair  [] Poor    Patient Requires Follow-up: [x] Yes  [] No    Return to Play:    [x]  N/A    PLAN: See magdaleno. PT 3x / week for 3 weeks then 2x/week for 9 weeks. [x] Continue per plan of care [] Alter current plan (see comments)  [] Plan of care initiated [] Hold pending MD visit [] Discharge    Electronically signed by: Ariana Contreras PT, DPT      Note: If patient does not return for scheduled/ recommended follow up visits, this note will serve as a discharge from care along with most recent update on progress.

## 2020-08-10 RX ORDER — OXYCODONE HYDROCHLORIDE AND ACETAMINOPHEN 5; 325 MG/1; MG/1
1 TABLET ORAL EVERY 8 HOURS PRN
Qty: 21 TABLET | Refills: 0 | Status: SHIPPED | OUTPATIENT
Start: 2020-08-10 | End: 2020-08-17

## 2020-08-10 NOTE — TELEPHONE ENCOUNTER
Spoke to patient and informed fax finally went through after several attempts today. She is requesting a refill of percocet. Refill order pended, same sig as prior.

## 2020-08-11 ENCOUNTER — HOSPITAL ENCOUNTER (OUTPATIENT)
Dept: PHYSICAL THERAPY | Age: 45
Setting detail: THERAPIES SERIES
Discharge: HOME | End: 2020-08-11
Payer: COMMERCIAL

## 2020-08-11 PROCEDURE — 97110 THERAPEUTIC EXERCISES: CPT | Performed by: PHYSICAL THERAPIST

## 2020-08-11 PROCEDURE — 97112 NEUROMUSCULAR REEDUCATION: CPT | Performed by: PHYSICAL THERAPIST

## 2020-08-11 PROCEDURE — 97530 THERAPEUTIC ACTIVITIES: CPT | Performed by: PHYSICAL THERAPIST

## 2020-08-11 NOTE — FLOWSHEET NOTE
[facility]  Phone: [phone]   Fax: [phone]      Physical Therapy Treatment Note/ Progress Report:     Date:  2020    Patient Name:  Lydia Khoury    :  1975  MRN: [id number]  Restrictions/Precautions:    Medical/Treatment Diagnosis Information:  Diagnosis: S83.512 (ICD-10-CM) - Anterior cruciate ligament complete tear, left  Treatment Diagnosis: L knee pain; L knee stiffness; L LE atrophy; L LE swelling; difficulty walking  Insurance/Certification information:  PT Insurance Information: [employer] [place] - visits per medical necessity; no auth  Physician Information:  Referring Practitioner: Dr. Celia Villegas of care signed (Y/N): [x]  Yes []  No     Date of Patient follow up with Physician: 8/3/2020     Progress Report: []  Yes  [x]  No     Date Range for reporting period:  Beginnin2020  Ending:     Progress report due (10 Rx/or 30 days whichever is less): visit #20 or      Recertification due (POC duration/ or 90 days whichever is less): visit #27 or 10/1/2020 (12 weeks)    Visit # Insurance Allowable Auth required? Date Range   12 Medical necessity   &    Not Covered []  Yes  [x]  No n/a     Latex Allergy:  [x]NO      []YES  Preferred Language for Healthcare:   [x]English       []other:    Functional Scale:        Date assessed:  LEFS: raw score = 20/80; dysfunction = 75%  2020    Pain level:  1-2/10    SUBJECTIVE: Pt. Reports that she is feeling better than last week. Pt. Continues to have occasional giving way in knee. Pt. Reports compliance with HEP but notes that she continues to struggle with the leg lifts. OBJECTIVE:   :  AD:  [] Bilateral [x] Single - longer distances [x] None - controlled environments, around the home  Brace for ambulation: [] No  [x] Yes - Range: unlocked;  Functional brace    Gait: WBAT without AD, slowed j luis and decreased step length with extensor lag    Mild effusion persisting      PROM AROM     L R L R   Knee Flexion 126 (ERMI)      Knee Extension         RESTRICTIONS/PRECAUTIONS: ACL repair and menisectomy 6/4/2020     Exercises/Interventions:     Therapeutic Exercise (82355) 25' Resistance / level Sets/sec Reps Notes / Cues   Bike (seat position 4) ROM 5'     IB - Calf stretch  30\"  3    Long Sitting Hamstring stretch  30\" 3    Prone quad stretch  30\" 3    ERMI   20\" 5           sidelying hip abduction     bridge    Standing HR   2 10    Standing HS curls 4# 2 10    LAQ (90-30) 4# 2 10    Leg press (90-10) DL 60#  SL ecc 30# 2  2 10  10 For improved quad control and strengthening   Modified wall sit           Therapeutic Activities (76025) 10'       Mini squats  Added 7/17 tactile cueing and quad facilitation       Added 7/17 verbal and tactile cueing for heel/toe pattern, appropriate knee bend, quad activation with stance   Heel Toe Gait through clinic  Verbal and tactile cueing for heel IC and toe push off with quad activation for TKE during L LE stance phase    SL sit to stand Added 7/29 cueing throughout for control; 7/31 held due to pain levels, unable to complete   DL sit to stand - w/ hip abd resistance To table  orange 2 10 Cueing to avoid femoral ADD/IR with band   Step up 2\" 1 15 Tactile facilitation for quad activation   Lateral stepping with band Brown @ knees 2 10steps    Pt. Educated on don functional brace  4'            Neuromuscular Re-ed (36312) 10'       For quadriceps atrophy and re-education in order to improve quad function and eccentric control which will improve function and QOL. Quad Set  5\" 5 Tactile facilitation      SAQ Wanda from PT to attain full range extension for first set.    SAQ to SLR supine  Added 7/29 - Min quad lag present from SAQ, able to maintain and not lose any extension with addition of SLR    SLR - flexion (semi-reclined)  Manual assist for initiation, cueing throughout to decrease extensor lag   SLR hold seated 10\" 3 Manual assist   Standing w/ slight knee flex B ER into PT resistance  For glute med activation to improve performance of sit<>stand transitions from table    TKE w/ DF    Retro treadmill walking npv? Manual Intervention (20798)       Manual OP into extension                         Pt. Education:  -all pt questions were answered    Home Exercise Program:  Access Code: [email or web address]    Date: 07/15/2020   Prepared by: Kyler Holder   Exercises   Standing Knee Flexion - 10 reps - 2 sets - 2x daily - 7x weekly   Sidelying Hip Abduction - 10 reps - 2 sets - 2x daily - 7x weekly   Prone Hip Extension - 10 reps - 2 sets - 2x daily - 7x weekly   Seated Long Arc Quad - 10 reps - 2 sets - 2x daily - 7x weekly     Access Code: Saranya Zuñiga   Date: 07/09/2020   Prepared by: Griselda Lopez   Exercises   Supine Ankle Pumps - 10 reps - 3 sets - 3-5x daily - 7x weekly   Long Sitting Calf Stretch with Strap - 10 reps - 3 sets - 3-5x daily - 7x weekly   Seated Table Hamstring Stretch - 10 reps - 3 sets - 3-5x daily - 7x weekly   Supine Quad Set - 10 reps - 3 sets - 3-5x daily - 7x weekly   Supine Heel Slide with Strap - 10 reps - 3 sets - 3-5x daily - 7x weekly   Seated Knee Flexion AAROM - 10 reps - 3 sets - 3-5x daily - 7x weekly   Supine Knee Flexion AAROM at Wall - 10 reps - 3 sets - 3-5x daily - 7x weekly   Supine Knee Flexion AAROM at Wall - 10 reps - 3 sets - 3-5x daily - 7x weekly     Therapeutic Exercise and NMR EXR  [x] (11343) Provided verbal/tactile cueing for activities related to strengthening, flexibility, endurance, ROM for improvements in LE, proximal hip, and core control with self care, mobility, lifting, ambulation. [x] (33161) Provided verbal/tactile cueing for activities related to improving balance, coordination, kinesthetic sense, posture, motor skill, proprioception  to assist with LE, proximal hip, and core control in self care, mobility, lifting, ambulation and eccentric single leg control.    [] (87953) Therapist is in constant attendance of 2 or more patients providing skilled therapy interventions, but not providing any significant amount of measurable one-on-one time to either patient, for improvements in LE, proximal hip, and core control in self care, mobility, lifting, ambulation and eccentric single leg control. NMR and Therapeutic Activities:    [x] (23707 or 55737) Provided verbal/tactile cueing for activities related to improving balance, coordination, kinesthetic sense, posture, motor skill, proprioception and motor activation to allow for proper function of core, proximal hip and LE with self care and ADLs  [] (72108) Gait Re-education- Provided training and instruction to the patient for proper LE, core and proximal hip recruitment and positioning and eccentric body weight control with ambulation re-education including up and down stairs     Home Exercise Program:    [x] (95347) Reviewed/Progressed HEP activities related to strengthening, flexibility, endurance, ROM of core, proximal hip and LE for functional self-care, mobility, lifting and ambulation/stair navigation   [] (06088)Reviewed/Progressed HEP activities related to improving balance, coordination, kinesthetic sense, posture, motor skill, proprioception of core, proximal hip and LE for self care, mobility, lifting, and ambulation/stair navigation      Manual Treatments:  PROM / STM / Oscillations-Mobs:  G-I, II, III, IV (PA's, Inf., Post.)  [x] (96505) Provided manual therapy to mobilize LE, proximal hip and/or LS spine soft tissue/joints for the purpose of modulating pain, promoting relaxation,  increasing ROM, reducing/eliminating soft tissue swelling/inflammation/restriction, improving soft tissue extensibility and allowing for proper ROM for normal function with self care, mobility, lifting and ambulation.      Modalities:  [] (21214) Vasopneumatic compression: Utilized vasopneumatic compression to decrease edema / swelling for the purpose of improving mobility and quad tone / recruitment which will allow for increased overall function including but not limited to self-care, transfers, ambulation, and ascending / descending stairs. Modalities:       Charges:  Timed Code Treatment Minutes: 45   Total Treatment Minutes: 45     [] EVAL - LOW (69391)   [] EVAL - MOD (78950)  [] EVAL - HIGH (68884)  [] RE-EVAL (42685)  [x] MI(30506) x 1      [] Ionto  [x] NMR (86451) x 1      [] Vaso  [] Manual (86333) x       [] Ultrasound  [x] TA x 1        [] Mech Traction (82825)  [] Aquatic Therapy x      [] ES (un) (50236):   [] Home Management Training x  [] ES(attended) (17032)   [] Dry Needling 1-2 muscles (03804):  [] Dry Needling 3+ muscles (712488  [] Group:      [] Other:     GOALS:  Patient stated goal: improve ROM and return to normal   [x]? Progressing: []? Met: []? Not Met: []? Adjusted     Therapist goals for Patient:   Short Term Goals: To be achieved in: 2 weeks  1. Independent in HEP and progression per patient tolerance, in order to prevent re-injury. []? Progressing: [x]? Met: []? Not Met: []? Adjusted  2. Patient will have a decrease in pain to facilitate improvement in movement, function, and ADLs as indicated by Functional Deficits. [x]? Progressing: []? Met: []? Not Met: []? Adjusted     Long Term Goals: To be achieved in: 12 weeks  1. Disability index score of 20% or less for the LEFS to assist with reaching prior level of function. []? Progressing: []? Met: [x]? Not Met: []? Adjusted  2. Patient will demonstrate increased AROM to at least 0-120 to allow for proper joint functioning as indicated by patients Functional Deficits. [x]? Progressing: []? Met: []? Not Met: []? Adjusted  3. Patient will demonstrate an increase in Strength to at least 4+/5 quad as well as good proximal hip strength and control to allow for proper functional mobility as indicated by patients Functional Deficits. [x]? Progressing: []?  Met: []? Not Met: []? Adjusted  4. Patient will return to functional activities including walking w/o assistive device without increased symptoms or restriction. [x]? Progressing: []? Met: []? Not Met: []? Adjusted  5. Patient will be able to return to work full duty without restriction. [x]? Progressing: []? Met: []? Not Met: []? Adjusted         Overall Progression Towards Functional goals/ Treatment Progress Update:  [] Patient is progressing as expected towards functional goals listed. [x] Progression is slowed due to complexities/Impairments listed. [] Progression has been slowed due to co-morbidities. [] Plan just implemented, too soon to assess goals progression <30days   [] Goals require adjustment due to lack of progress  [] Patient is not progressing as expected and requires additional follow up with physician  [] Other    Persisting Functional Limitations/Impairments:  [x]Sitting [x]Standing   [x]Walking [x]Stairs   [x]Transfers [x]ADLs   [x]Squatting/bending [x]Kneeling  [x]Housework [x]Job related tasks  []Driving [x]Sports/Recreation   [x]Sleeping []Other:    ASSESSMENT: Pt. With improved tolerance for therapy today. Pt. Switched to using functional brace throughout treatment session, pt. Educated on correct positioning and donning functional brace. Pt. Continues to note patellar tendon pain with SLR but improved independence in holds. Pt. Maintaining knee mobility. Pt. Continues to require tactile facilitation for quad and appropriate weight shift with step up. Added SL eccentric on leg press to facilitate further strengthening of quad and healing of patellar tendon. Pt. Requires continued progression of skilled therapy in order to restore quad strength and decrease pain for normalized gait.      Treatment/Activity Tolerance:  [x] Pt able to complete treatment [] Patient limited by fatique  [] Patient limited by pain  [] Patient limited by other medical complications  [] Other:     Prognosis:  [x] Good [] Fair  [] Poor    Patient Requires Follow-up: [x] Yes  [] No    Return to Play:    [x]  N/A    PLAN: See [facility]. PT 3x / week for 3 weeks then 2x/week for 9 weeks. [x] Continue per plan of care [] Alter current plan (see comments)  [] Plan of care initiated [] Hold pending MD visit [] Discharge    Electronically signed by: Saeid Cheng PT, DPT      Note: If patient does not return for scheduled/ recommended follow up visits, this note will serve as a discharge from care along with most recent update on progress.

## 2020-08-14 ENCOUNTER — HOSPITAL ENCOUNTER (OUTPATIENT)
Dept: PHYSICAL THERAPY | Age: 45
Setting detail: THERAPIES SERIES
Discharge: HOME | End: 2020-08-14
Payer: COMMERCIAL

## 2020-08-14 PROCEDURE — 97112 NEUROMUSCULAR REEDUCATION: CPT

## 2020-08-14 PROCEDURE — 97530 THERAPEUTIC ACTIVITIES: CPT

## 2020-08-14 PROCEDURE — 97110 THERAPEUTIC EXERCISES: CPT

## 2020-08-14 NOTE — FLOWSHEET NOTE
med activation to improve performance of sit<>stand transitions from table    TKE w/ DF    Retro treadmill walking npv? Manual Intervention (51596)       Manual OP into extension                         Pt. Education:  -all pt questions were answered    Home Exercise Program:  Access Code: [email or web address]    Date: 07/15/2020   Prepared by: Kamlesh Vencesing   Exercises   Standing Knee Flexion - 10 reps - 2 sets - 2x daily - 7x weekly   Sidelying Hip Abduction - 10 reps - 2 sets - 2x daily - 7x weekly   Prone Hip Extension - 10 reps - 2 sets - 2x daily - 7x weekly   Seated Long Arc Quad - 10 reps - 2 sets - 2x daily - 7x weekly     Access Code: Nova Gonzalez   Date: 07/09/2020   Prepared by: Ayse Quinn   Exercises   Supine Ankle Pumps - 10 reps - 3 sets - 3-5x daily - 7x weekly   Long Sitting Calf Stretch with Strap - 10 reps - 3 sets - 3-5x daily - 7x weekly   Seated Table Hamstring Stretch - 10 reps - 3 sets - 3-5x daily - 7x weekly   Supine Quad Set - 10 reps - 3 sets - 3-5x daily - 7x weekly   Supine Heel Slide with Strap - 10 reps - 3 sets - 3-5x daily - 7x weekly   Seated Knee Flexion AAROM - 10 reps - 3 sets - 3-5x daily - 7x weekly   Supine Knee Flexion AAROM at Wall - 10 reps - 3 sets - 3-5x daily - 7x weekly   Supine Knee Flexion AAROM at Wall - 10 reps - 3 sets - 3-5x daily - 7x weekly     Therapeutic Exercise and NMR EXR  [x] (06689) Provided verbal/tactile cueing for activities related to strengthening, flexibility, endurance, ROM for improvements in LE, proximal hip, and core control with self care, mobility, lifting, ambulation.   [x] (84206) Provided verbal/tactile cueing for activities related to improving balance, coordination, kinesthetic sense, posture, motor skill, proprioception  to assist with LE, proximal hip, and core control in self care, mobility, lifting, ambulation and eccentric single leg control.   [] (31828) Therapist is in constant attendance of 2 or more patients providing skilled therapy interventions, but not providing any significant amount of measurable one-on-one time to either patient, for improvements in LE, proximal hip, and core control in self care, mobility, lifting, ambulation and eccentric single leg control. NMR and Therapeutic Activities:    [x] (38953 or 99713) Provided verbal/tactile cueing for activities related to improving balance, coordination, kinesthetic sense, posture, motor skill, proprioception and motor activation to allow for proper function of core, proximal hip and LE with self care and ADLs  [] (01058) Gait Re-education- Provided training and instruction to the patient for proper LE, core and proximal hip recruitment and positioning and eccentric body weight control with ambulation re-education including up and down stairs     Home Exercise Program:    [x] (57952) Reviewed/Progressed HEP activities related to strengthening, flexibility, endurance, ROM of core, proximal hip and LE for functional self-care, mobility, lifting and ambulation/stair navigation   [] (90264)Reviewed/Progressed HEP activities related to improving balance, coordination, kinesthetic sense, posture, motor skill, proprioception of core, proximal hip and LE for self care, mobility, lifting, and ambulation/stair navigation      Manual Treatments:  PROM / STM / Oscillations-Mobs:  G-I, II, III, IV (PA's, Inf., Post.)  [x] (72392) Provided manual therapy to mobilize LE, proximal hip and/or LS spine soft tissue/joints for the purpose of modulating pain, promoting relaxation,  increasing ROM, reducing/eliminating soft tissue swelling/inflammation/restriction, improving soft tissue extensibility and allowing for proper ROM for normal function with self care, mobility, lifting and ambulation.      Modalities:  [] (98198) Vasopneumatic compression: Utilized vasopneumatic compression to decrease edema / swelling for the purpose of improving mobility and quad tone / recruitment which will allow for increased overall function including but not limited to self-care, transfers, ambulation, and ascending / descending stairs. Modalities:       Charges:  Timed Code Treatment Minutes: 42   Total Treatment Minutes: 42     [] EVAL - LOW (13790)   [] EVAL - MOD (10308)  [] EVAL - HIGH (18410)  [] RE-EVAL (38403)  [x] YL(25779) x 1      [] Ionto  [x] NMR (20479) x 1      [] Vaso  [] Manual (09329) x       [] Ultrasound  [x] TA x 1        [] Mech Traction (47212)  [] Aquatic Therapy x      [] ES (un) (95908):   [] Home Management Training x  [] ES(attended) (91282)   [] Dry Needling 1-2 muscles (54494):  [] Dry Needling 3+ muscles (994359  [] Group:      [] Other:     GOALS:  Patient stated goal: improve ROM and return to normal   [x]? Progressing: []? Met: []? Not Met: []? Adjusted     Therapist goals for Patient:   Short Term Goals: To be achieved in: 2 weeks  1. Independent in HEP and progression per patient tolerance, in order to prevent re-injury. []? Progressing: [x]? Met: []? Not Met: []? Adjusted  2. Patient will have a decrease in pain to facilitate improvement in movement, function, and ADLs as indicated by Functional Deficits. [x]? Progressing: []? Met: []? Not Met: []? Adjusted     Long Term Goals: To be achieved in: 12 weeks  1. Disability index score of 20% or less for the LEFS to assist with reaching prior level of function. []? Progressing: []? Met: [x]? Not Met: []? Adjusted  2. Patient will demonstrate increased AROM to at least 0-120 to allow for proper joint functioning as indicated by patients Functional Deficits. [x]? Progressing: []? Met: []? Not Met: []? Adjusted  3. Patient will demonstrate an increase in Strength to at least 4+/5 quad as well as good proximal hip strength and control to allow for proper functional mobility as indicated by patients Functional Deficits. [x]? Progressing: []? Met: []? Not Met: []? Adjusted  4.  Patient will return to functional activities including walking w/o assistive device without increased symptoms or restriction. [x]? Progressing: []? Met: []? Not Met: []? Adjusted  5. Patient will be able to return to work full duty without restriction. [x]? Progressing: []? Met: []? Not Met: []? Adjusted         Overall Progression Towards Functional goals/ Treatment Progress Update:  [] Patient is progressing as expected towards functional goals listed. [x] Progression is slowed due to complexities/Impairments listed. [] Progression has been slowed due to co-morbidities. [] Plan just implemented, too soon to assess goals progression <30days   [] Goals require adjustment due to lack of progress  [] Patient is not progressing as expected and requires additional follow up with physician  [] Other    Persisting Functional Limitations/Impairments:  [x]Sitting [x]Standing   [x]Walking [x]Stairs   [x]Transfers [x]ADLs   [x]Squatting/bending [x]Kneeling  [x]Housework [x]Job related tasks  []Driving [x]Sports/Recreation   [x]Sleeping []Other:    ASSESSMENT: Pt. With improved tolerance for therapy today. Pt. Continues to note patellar tendon pain with SLR but improved independence in holds. Pt. Maintaining knee mobility. Pt. Continues to require tactile facilitation for quad and appropriate weight shift with step up. Progressed DL Squatting to chair with AirEx as pt is able to perform deeper squat without c/o pain/symptoms. Pt. Requires continued progression of skilled therapy in order to restore quad strength and decrease pain for normalized gait. Treatment/Activity Tolerance:  [x] Pt able to complete treatment [] Patient limited by fatique  [] Patient limited by pain  [] Patient limited by other medical complications  [] Other:     Prognosis:  [x] Good [] Fair  [] Poor    Patient Requires Follow-up: [x] Yes  [] No    Return to Play:    [x]  N/A    PLAN: See eval. PT 3x / week for 3 weeks then 2x/week for 9 weeks.     [x] Continue per plan of care [] Alter current plan (see comments)  [] Plan of care initiated [] Hold pending MD visit [] Discharge    Electronically signed by: Liz Huertas PT, DPT      Note: If patient does not return for scheduled/ recommended follow up visits, this note will serve as a discharge from care along with most recent update on progress.

## 2020-08-17 ENCOUNTER — HOSPITAL ENCOUNTER (OUTPATIENT)
Dept: PHYSICAL THERAPY | Age: 45
Setting detail: THERAPIES SERIES
Discharge: HOME | End: 2020-08-17
Payer: COMMERCIAL

## 2020-08-17 PROCEDURE — 97530 THERAPEUTIC ACTIVITIES: CPT

## 2020-08-17 PROCEDURE — 97110 THERAPEUTIC EXERCISES: CPT

## 2020-08-17 NOTE — FLOWSHEET NOTE
[facility]  Phone: [phone]   Fax: [phone]      Physical Therapy Treatment Note/ Progress Report:     Date:  2020    Patient Name:  Batool Meza    :  1975  MRN: [id number]  Restrictions/Precautions:    Medical/Treatment Diagnosis Information:  Diagnosis: S83.512 (ICD-10-CM) - Anterior cruciate ligament complete tear, left  Treatment Diagnosis: L knee pain; L knee stiffness; L LE atrophy; L LE swelling; difficulty walking  Insurance/Certification information:  PT Insurance Information: [employer] - visits per medical necessity; no auth  Physician Information:  Referring Practitioner: Dr. Michelle Grimes of care signed (Y/N): [x]  Yes []  No     Date of Patient follow up with Physician: 8/3/2020     Progress Report: []  Yes  [x]  No     Date Range for reporting period:  Beginnin2020  Ending:     Progress report due (10 Rx/or 30 days whichever is less): visit #20 or 1430     Recertification due (POC duration/ or 90 days whichever is less): visit #27 or 10/1/2020 (12 weeks)    Visit # Insurance Allowable Auth required? Date Range   14 Medical necessity   &    Not Covered []  Yes  [x]  No n/a     Latex Allergy:  [x]NO      []YES  Preferred Language for Healthcare:   [x]English       []other:    Functional Scale:        Date assessed:  LEFS: raw score = 20/80; dysfunction = 75%  2020    Pain level:  3-4/10    SUBJECTIVE: Pt reports her pain levels are much higher today after a weekend of just walking around at the park. Pt is frustrated with slow progress and states he does not feel any better than 4 weeks ago and is discouraged to see the MD on Monday. OBJECTIVE:   :  AD:  [] Bilateral [x] Single - longer distances [x] None - controlled environments, around the home  Brace for ambulation: [] No  [x] Yes - Range: unlocked;  Functional brace    Gait: WBAT without AD, slowed j luis and decreased step length with extensor lag    Mild effusion persisting      PROM AROM     L R L R   Knee Flexion 126 (ERMI)      Knee Extension         RESTRICTIONS/PRECAUTIONS: ACL repair and menisectomy 6/4/2020     Exercises/Interventions:     Therapeutic Exercise (27840) 25' Resistance / level Sets/sec Reps Notes / Cues   Bike (seat position 4) ROM 5'     IB - Calf stretch  30\"  3    Long Sitting Hamstring stretch  30\" 3    Prone quad stretch  30\" 3    ERMI   20\" 5           sidelying hip abduction     bridge    Standing HR   2 10    Standing HS curls 4# 2 10    LAQ (90-30) 4# 2 10    Leg press (90-10) DL 60#  SL 30# 2  2 10  10 For improved quad control and strengthening   Modified wall sit ~60deg 30\" 3           Therapeutic Activities (33164) 20'       Mini squats  Added 7/17 tactile cueing and quad facilitation       Added 7/17 verbal and tactile cueing for heel/toe pattern, appropriate knee bend, quad activation with stance   Heel Toe Gait through clinic  Verbal and tactile cueing for heel IC and toe push off with quad activation for TKE during L LE stance phase    SL sit to stand Added 7/29 cueing throughout for control; 7/31 held due to pain levels, unable to complete   DL sit to stand - w/ hip abd resistance To chair w/ AirEx on top  lime 2 10 Cueing to avoid femoral ADD/IR with band   Step up 4\" 2 10 Tactile facilitation for quad activation   Step down 2\" 1 10    Lateral stepping with band Lime @ knees 2 10steps    Pt. Educated on don functional brace             Neuromuscular Re-ed (62911) '       For quadriceps atrophy and re-education in order to improve quad function and eccentric control which will improve function and QOL. Quad Set  Tactile facilitation      SAQ Wanda from PT to attain full range extension for first set.    SAQ to SLR supine  Added 7/29 - Min quad lag present from SAQ, able to maintain and not lose any extension with addition of SLR    SLR - flexion (semi-reclined)  Manual assist for initiation, cueing throughout to decrease extensor lag   SLR hold Manual assist; 8/17 - held due to pain levels   Standing w/ slight knee flex B ER into PT resistance  For glute med activation to improve performance of sit<>stand transitions from table    TKE w/ DF    Retro treadmill walking npv? Manual Intervention (57605)       Manual OP into extension                         Pt. Education:  -all pt questions were answered    Home Exercise Program:  Access Code: [email or web address]    Date: 07/15/2020   Prepared by: Dariel Adan   Exercises   Standing Knee Flexion - 10 reps - 2 sets - 2x daily - 7x weekly   Sidelying Hip Abduction - 10 reps - 2 sets - 2x daily - 7x weekly   Prone Hip Extension - 10 reps - 2 sets - 2x daily - 7x weekly   Seated Long Arc Quad - 10 reps - 2 sets - 2x daily - 7x weekly     Access Code: Lynn Lolinu   Date: 07/09/2020   Prepared by: Tammie Kpaoor   Exercises   Supine Ankle Pumps - 10 reps - 3 sets - 3-5x daily - 7x weekly   Long Sitting Calf Stretch with Strap - 10 reps - 3 sets - 3-5x daily - 7x weekly   Seated Table Hamstring Stretch - 10 reps - 3 sets - 3-5x daily - 7x weekly   Supine Quad Set - 10 reps - 3 sets - 3-5x daily - 7x weekly   Supine Heel Slide with Strap - 10 reps - 3 sets - 3-5x daily - 7x weekly   Seated Knee Flexion AAROM - 10 reps - 3 sets - 3-5x daily - 7x weekly   Supine Knee Flexion AAROM at Wall - 10 reps - 3 sets - 3-5x daily - 7x weekly   Supine Knee Flexion AAROM at Wall - 10 reps - 3 sets - 3-5x daily - 7x weekly     Therapeutic Exercise and NMR EXR  [x] (97283) Provided verbal/tactile cueing for activities related to strengthening, flexibility, endurance, ROM for improvements in LE, proximal hip, and core control with self care, mobility, lifting, ambulation.   [x] (95759) Provided verbal/tactile cueing for activities related to improving balance, coordination, kinesthetic sense, posture, motor skill, proprioception  to assist with LE, proximal hip, and core control in self care, mobility, lifting, ambulation and eccentric single leg control.   [] (80079) Therapist is in constant attendance of 2 or more patients providing skilled therapy interventions, but not providing any significant amount of measurable one-on-one time to either patient, for improvements in LE, proximal hip, and core control in self care, mobility, lifting, ambulation and eccentric single leg control. NMR and Therapeutic Activities:    [x] (66195 or 77792) Provided verbal/tactile cueing for activities related to improving balance, coordination, kinesthetic sense, posture, motor skill, proprioception and motor activation to allow for proper function of core, proximal hip and LE with self care and ADLs  [] (65497) Gait Re-education- Provided training and instruction to the patient for proper LE, core and proximal hip recruitment and positioning and eccentric body weight control with ambulation re-education including up and down stairs     Home Exercise Program:    [x] (12442) Reviewed/Progressed HEP activities related to strengthening, flexibility, endurance, ROM of core, proximal hip and LE for functional self-care, mobility, lifting and ambulation/stair navigation   [] (41681)Reviewed/Progressed HEP activities related to improving balance, coordination, kinesthetic sense, posture, motor skill, proprioception of core, proximal hip and LE for self care, mobility, lifting, and ambulation/stair navigation      Manual Treatments:  PROM / STM / Oscillations-Mobs:  G-I, II, III, IV (PA's, Inf., Post.)  [x] (74465) Provided manual therapy to mobilize LE, proximal hip and/or LS spine soft tissue/joints for the purpose of modulating pain, promoting relaxation,  increasing ROM, reducing/eliminating soft tissue swelling/inflammation/restriction, improving soft tissue extensibility and allowing for proper ROM for normal function with self care, mobility, lifting and ambulation.      Modalities:  [] (18950) Vasopneumatic compression: Utilized vasopneumatic compression to decrease edema / swelling for the purpose of improving mobility and quad tone / recruitment which will allow for increased overall function including but not limited to self-care, transfers, ambulation, and ascending / descending stairs. Modalities:       Charges:  Timed Code Treatment Minutes: 45   Total Treatment Minutes: 45     [] EVAL - LOW (87756)   [] EVAL - MOD (44695)  [] EVAL - HIGH (42327)  [] RE-EVAL (47940)  [x] QQ(88594) x 2      [] Ionto  [] NMR (98421) x 1      [] Vaso  [] Manual (63471) x       [] Ultrasound  [x] TA x 1        [] Mech Traction (43963)  [] Aquatic Therapy x      [] ES (un) (04210):   [] Home Management Training x  [] ES(attended) (03288)   [] Dry Needling 1-2 muscles (35385):  [] Dry Needling 3+ muscles (473132  [] Group:      [] Other:     GOALS:  Patient stated goal: improve ROM and return to normal   [x]? Progressing: []? Met: []? Not Met: []? Adjusted     Therapist goals for Patient:   Short Term Goals: To be achieved in: 2 weeks  1. Independent in HEP and progression per patient tolerance, in order to prevent re-injury. []? Progressing: [x]? Met: []? Not Met: []? Adjusted  2. Patient will have a decrease in pain to facilitate improvement in movement, function, and ADLs as indicated by Functional Deficits. [x]? Progressing: []? Met: []? Not Met: []? Adjusted     Long Term Goals: To be achieved in: 12 weeks  1. Disability index score of 20% or less for the LEFS to assist with reaching prior level of function. []? Progressing: []? Met: [x]? Not Met: []? Adjusted  2. Patient will demonstrate increased AROM to at least 0-120 to allow for proper joint functioning as indicated by patients Functional Deficits. [x]? Progressing: []? Met: []? Not Met: []? Adjusted  3.  Patient will demonstrate an increase in Strength to at least 4+/5 quad as well as good proximal hip strength and control to allow for proper functional mobility as indicated by patients Functional Deficits. [x]? Progressing: []? Met: []? Not Met: []? Adjusted  4. Patient will return to functional activities including walking w/o assistive device without increased symptoms or restriction. [x]? Progressing: []? Met: []? Not Met: []? Adjusted  5. Patient will be able to return to work full duty without restriction. [x]? Progressing: []? Met: []? Not Met: []? Adjusted         Overall Progression Towards Functional goals/ Treatment Progress Update:  [] Patient is progressing as expected towards functional goals listed. [x] Progression is slowed due to complexities/Impairments listed. [] Progression has been slowed due to co-morbidities. [] Plan just implemented, too soon to assess goals progression <30days   [] Goals require adjustment due to lack of progress  [] Patient is not progressing as expected and requires additional follow up with physician  [] Other    Persisting Functional Limitations/Impairments:  [x]Sitting [x]Standing   [x]Walking [x]Stairs   [x]Transfers [x]ADLs   [x]Squatting/bending [x]Kneeling  [x]Housework [x]Job related tasks  []Driving [x]Sports/Recreation   [x]Sleeping []Other:    ASSESSMENT: Pt. With improved tolerance for therapy today progressing exercises as outlined in bold despite reports of higher pain (prior to session). Pt progressed to initiate step downs on 2\" step to avoid compensations of hip circumduction with loss of quad eccentric control. Pt fearful of descend with knee flexion of L LE, believing the knee may buckle on her. Pt. Continues to require tactile facilitation for quad and appropriate weight shift with step up. Pt. Requires continued progression of skilled therapy in order to restore quad strength and decrease pain for normalized gait.      Treatment/Activity Tolerance:  [x] Pt able to complete treatment [] Patient limited by fatique  [] Patient limited by pain  [] Patient limited by other medical complications  [] Other:     Prognosis:  [x] Good [] Fair  [] Poor    Patient Requires Follow-up: [x] Yes  [] No    Return to Play:    [x]  N/A    PLAN: See eval. PT 3x / week for 3 weeks then 2x/week for 9 weeks. [x] Continue per plan of care [] Alter current plan (see comments)  [] Plan of care initiated [] Hold pending MD visit [] Discharge    Electronically signed by: Yady Milan PT, DPT      Note: If patient does not return for scheduled/ recommended follow up visits, this note will serve as a discharge from care along with most recent update on progress.

## 2020-08-20 ENCOUNTER — HOSPITAL ENCOUNTER (OUTPATIENT)
Dept: PHYSICAL THERAPY | Age: 45
Setting detail: THERAPIES SERIES
Discharge: HOME | End: 2020-08-20
Payer: COMMERCIAL

## 2020-08-20 NOTE — PROGRESS NOTES
Physical Therapy  Cancellation/No-show Note  Patient Name:  Abhijeet Summers  :  1975   Date:  2020  Cancelled visits to date: 2  No-shows to date: 0    Patient status for today's appointment patient:  [x]  Cancelled ,   []  Rescheduled appointment  []  No-show     Reason given by patient:  []  Patient ill  []  Conflicting appointment  []  No transportation    []  Conflict with work  []  No reason given  [x]  Other:     Comments:   Pt. Arrived 30 minutes late for appointment this date and had appointment time confused. Pt. Was unable to be seen at that time. Pt. Offered spot later in the afternoon but was unable to come back for that time so appointment was cancelled.      Phone call information:   []  Phone call made today to patient at _ time at number provided:      []  Patient answered, conversation as follows:    []  Patient did not answer, message left as follows:  [x]  Phone call not made today  []  Phone call not made today - patient called in and provided reason for cancellation    Electronically signed by:  Hany Barros, PT, DPT

## 2020-08-24 ENCOUNTER — TELEPHONE (OUTPATIENT)
Dept: ORTHOPEDIC SURGERY | Age: 45
End: 2020-08-24

## 2020-08-24 ENCOUNTER — OFFICE VISIT (OUTPATIENT)
Dept: ORTHOPEDIC SURGERY | Age: 45
End: 2020-08-24

## 2020-08-24 ENCOUNTER — HOSPITAL ENCOUNTER (OUTPATIENT)
Dept: PHYSICAL THERAPY | Age: 45
Setting detail: THERAPIES SERIES
Discharge: HOME | End: 2020-08-24
Payer: COMMERCIAL

## 2020-08-24 VITALS — WEIGHT: 140 LBS | BODY MASS INDEX: 24.8 KG/M2 | HEIGHT: 63 IN | TEMPERATURE: 99.1 F

## 2020-08-24 PROCEDURE — 99024 POSTOP FOLLOW-UP VISIT: CPT | Performed by: PHYSICIAN ASSISTANT

## 2020-08-24 PROCEDURE — 97110 THERAPEUTIC EXERCISES: CPT

## 2020-08-24 PROCEDURE — 97530 THERAPEUTIC ACTIVITIES: CPT

## 2020-08-24 RX ORDER — GABAPENTIN 300 MG/1
300 CAPSULE ORAL 2 TIMES DAILY
Qty: 60 CAPSULE | Refills: 2 | Status: SHIPPED | OUTPATIENT
Start: 2020-08-24 | End: 2020-11-05 | Stop reason: ALTCHOICE

## 2020-08-24 NOTE — PROGRESS NOTES
Plan:    Patient is over 11 weeks postop and still dealing with a considerable amount of pain which she feels is worsening. Patient has pain with even light touch of the skin over her knee and states she has pain when the bed sheets rub over her knee. There is some concern for developing complex regional pain syndrome. There is no swelling or joint effusion of the left knee at today's visit. Her surgical incision is well-healed and there is no erythema, induration or warmth. Patient still lacks about 20 degrees of active knee extension but can easily extend to 0 degrees passively. She has active knee flexion to 115 degrees and can easily get to 125 degrees passively. She still has considerable weakness with both extension and flexion. At her last visit she was still wearing her T scope brace and having to lock it in extension with ambulation. She is now wearing her functional brace but states her knee still occasionally feels like it is going to [place] out\". She is no longer using crutches. She was given a Medrol Dosepak and biomed cream at her last visit and states neither of these medications helped with her pain. I feel it is worth trying gabapentin to help control her pain. Patient will continue work with physical therapy as well as continuing with her home exercises and stretches. She will plan on doing a video visit in 2 weeks to let us know how she is doing with the medication. Isai Oviedo was informed of the results of any imaging. We discussed treatment options and a time was given to answer questions. A plan was proposed and Isai Oviedo understand and accepts this course of care. Patient was also seen and evaluated by Dr. Land. He is in agreement with my assessment and together we formulated a plan for the patient.             Electronically signed by Rock Geoffrey TEJADA on 6/19/6552  Board Certified [facility]    Please note that portions of this note were completed with a voice recognition program.  Efforts were made to edit the dictations but occasionally words are mis-transcribed.

## 2020-08-24 NOTE — TELEPHONE ENCOUNTER
RECEIVED APS FROM [employer] WAITING UNTIL AFTER 8/24/2020 PO VISIT TO COMPLETE    FAXED COMPLETED APS TO [employer] @ [phone]

## 2020-08-24 NOTE — FLOWSHEET NOTE
Gilda Pearson  Phone: [phone]   Fax: [phone]      Physical Therapy Treatment Note/ Progress Report:     Date:  2020    Patient Name:  Jason Garcia    :  1975  MRN: [id number]  Restrictions/Precautions:    Medical/Treatment Diagnosis Information:  Diagnosis: S83.512 (ICD-10-CM) - Anterior cruciate ligament complete tear, left  Treatment Diagnosis: L knee pain; L knee stiffness; L LE atrophy; L LE swelling; difficulty walking  Insurance/Certification information:  PT Insurance Information: [facility] [place] - visits per medical necessity; no auth  Physician Information:  Referring Practitioner: Dr. [employer] [facility] signed (Y/N): [x]  Yes []  No     Date of Patient follow up with Physician: 8/3/2020     Progress Report: []  Yes  [x]  No     Date Range for reporting period:  Beginnin2020  Ending:     Progress report due (10 Rx/or 30 days whichever is less): visit #20 or      Recertification due (POC duration/ or 90 days whichever is less): visit #27 or 10/1/2020 (12 weeks)    Visit # Insurance Allowable Auth required? Date Range   15 Medical necessity   &    Not Covered []  Yes  [x]  No n/a     Latex Allergy:  [x]NO      []YES  Preferred Language for Healthcare:   [x]English       []other:    Functional Scale:        Date assessed:  LEFS: raw score = 20/80; dysfunction = 75%  2020    Pain level:  4/10    SUBJECTIVE: Pt reports she saw MD just before session today. Reports MD said her pain was \"unusual\" with the level of pain and sensitivity on the knee she is having. Prescribed gabapentin to try and decrease the pain. Extended off duty work by 6 weeks. She states he did not say anything about her function and what the expectations were for return to normalized community mobility. Pt reports she continues to be very weak and her knee will likely \"give out\" throughout the day.  Reports she feels she is regressing verse progressing with function. OBJECTIVE:   8/24:  AD:  [] Bilateral [] Single - longer distances [x] None - controlled environments, around the home  Brace for ambulation: [] No  [x] Yes - Range: unlocked; Functional brace    Gait: WBAT without AD, slowed j luis and decreased step length with extensor lag    Mild effusion persisting      PROM AROM     L R L R   Knee Flexion 126 (ERMI)      Knee Extension         RESTRICTIONS/PRECAUTIONS: ACL repair and menisectomy 6/4/2020     Exercises/Interventions:     Therapeutic Exercise (87854)  Resistance / level Sets/sec Reps Notes / Cues   Bike (seat position 4) ROM 5'     IB - Calf stretch  30\"  3    Long Sitting Hamstring stretch  30\" 3    Prone quad stretch  30\" 3    ERMI   20\" 5           sidelying hip abduction     bridge    Standing HR   2 10    Standing HS curls 4# 2 10    LAQ (90-30) 4# 2 10    Leg press (90-10) DL 60#  SL 30# 2  2 10  10 For improved quad control and strengthening   Modified wall sit ~60deg 30\" 4           Therapeutic Activities (04655)        Mini squats  Added 7/17 tactile cueing and quad facilitation       Added 7/17 verbal and tactile cueing for heel/toe pattern, appropriate knee bend, quad activation with stance   Heel Toe Gait through clinic  Verbal and tactile cueing for heel IC and toe push off with quad activation for TKE during L LE stance phase    SL sit to stand Added 7/29 cueing throughout for control; 7/31 held due to pain levels, unable to complete   DL sit to stand To chair 2 10 Cueing to avoid femoral ADD/IR with band   Step up 4\" 2 10 Tactile facilitation for quad activation   Step down 2\" 1 10 Cue to allow for quad eccentric control by bending knee prior to R foot hitting floor   Lateral stepping with band Lime @ ankles 3 10steps    Pt.  Educated on don functional brace             Neuromuscular Re-ed (55880)        For quadriceps atrophy and re-education in order to improve quad function and eccentric control which will improve function and QOL. Quad Set  Tactile facilitation      SAQ Wanda from PT to attain full range extension for first set. SAQ to SLR supine  Added 7/29 - Min quad lag present from SAQ, able to maintain and not lose any extension with addition of SLR    SLR - flexion (semi-reclined)  Manual assist for initiation, cueing throughout to decrease extensor lag   SLR hold Manual assist; 8/17 - held due to pain levels   Standing w/ slight knee flex B ER into PT resistance  For glute med activation to improve performance of sit<>stand transitions from table    TKE w/ DF    Retro treadmill walking npv?                     Manual Intervention (15058)       Manual OP into extension                         Pt. Education:  -all pt questions were answered    Home Exercise Program:  Access Code: [email or web address]    Date: 07/15/2020   Prepared by: Yesenia Pina   Exercises   Standing Knee Flexion - 10 reps - 2 sets - 2x daily - 7x weekly   Sidelying Hip Abduction - 10 reps - 2 sets - 2x daily - 7x weekly   Prone Hip Extension - 10 reps - 2 sets - 2x daily - 7x weekly   Seated Long Arc Quad - 10 reps - 2 sets - 2x daily - 7x weekly     Access Code: Shanel Hammer   Date: 07/09/2020   Prepared by: Sebastien Dooley   Exercises   Supine Ankle Pumps - 10 reps - 3 sets - 3-5x daily - 7x weekly   Long Sitting Calf Stretch with Strap - 10 reps - 3 sets - 3-5x daily - 7x weekly   Seated Table Hamstring Stretch - 10 reps - 3 sets - 3-5x daily - 7x weekly   Supine Quad Set - 10 reps - 3 sets - 3-5x daily - 7x weekly   Supine Heel Slide with Strap - 10 reps - 3 sets - 3-5x daily - 7x weekly   Seated Knee Flexion AAROM - 10 reps - 3 sets - 3-5x daily - 7x weekly   Supine Knee Flexion AAROM at Wall - 10 reps - 3 sets - 3-5x daily - 7x weekly   Supine Knee Flexion AAROM at Wall - 10 reps - 3 sets - 3-5x daily - 7x weekly     Therapeutic Exercise and NMR EXR  [x] (15700) Provided verbal/tactile cueing for activities related to strengthening, flexibility, endurance, ROM for improvements in LE, proximal hip, and core control with self care, mobility, lifting, ambulation. [x] (11361) Provided verbal/tactile cueing for activities related to improving balance, coordination, kinesthetic sense, posture, motor skill, proprioception  to assist with LE, proximal hip, and core control in self care, mobility, lifting, ambulation and eccentric single leg control.   [] (04365) Therapist is in constant attendance of 2 or more patients providing skilled therapy interventions, but not providing any significant amount of measurable one-on-one time to either patient, for improvements in LE, proximal hip, and core control in self care, mobility, lifting, ambulation and eccentric single leg control.      NMR and Therapeutic Activities:    [x] (68990 or 94212) Provided verbal/tactile cueing for activities related to improving balance, coordination, kinesthetic sense, posture, motor skill, proprioception and motor activation to allow for proper function of core, proximal hip and LE with self care and ADLs  [] (73372) Gait Re-education- Provided training and instruction to the patient for proper LE, core and proximal hip recruitment and positioning and eccentric body weight control with ambulation re-education including up and down stairs     Home Exercise Program:    [x] (77545) Reviewed/Progressed HEP activities related to strengthening, flexibility, endurance, ROM of core, proximal hip and LE for functional self-care, mobility, lifting and ambulation/stair navigation   [] (01166)Reviewed/Progressed HEP activities related to improving balance, coordination, kinesthetic sense, posture, motor skill, proprioception of core, proximal hip and LE for self care, mobility, lifting, and ambulation/stair navigation      Manual Treatments:  PROM / STM / Oscillations-Mobs:  G-I, II, III, IV (PA's, Inf., Post.)  [x] (36377) Provided manual therapy to mobilize LE, proximal hip and/or LS spine soft tissue/joints for the purpose of modulating pain, promoting relaxation,  increasing ROM, reducing/eliminating soft tissue swelling/inflammation/restriction, improving soft tissue extensibility and allowing for proper ROM for normal function with self care, mobility, lifting and ambulation. Modalities:  [] (76770) Vasopneumatic compression: Utilized vasopneumatic compression to decrease edema / swelling for the purpose of improving mobility and quad tone / recruitment which will allow for increased overall function including but not limited to self-care, transfers, ambulation, and ascending / descending stairs. Modalities:       Charges:  Timed Code Treatment Minutes: 45   Total Treatment Minutes: 45     [] EVAL - LOW (73358)   [] EVAL - MOD (10710)  [] EVAL - HIGH (44266)  [] RE-EVAL (38365)  [x] MC(36870) x 2      [] Ionto  [] NMR (62057) x 1      [] Vaso  [] Manual (36984) x       [] Ultrasound  [x] TA x 1        [] Mech Traction (43097)  [] Aquatic Therapy x      [] ES (un) (79914):   [] Home Management Training x  [] ES(attended) (76705)   [] Dry Needling 1-2 muscles (73499):  [] Dry Needling 3+ muscles (583400  [] Group:      [] Other:     GOALS:  Patient stated goal: improve ROM and return to normal   [x]? Progressing: []? Met: []? Not Met: []? Adjusted     Therapist goals for Patient:   Short Term Goals: To be achieved in: 2 weeks  1. Independent in HEP and progression per patient tolerance, in order to prevent re-injury. []? Progressing: [x]? Met: []? Not Met: []? Adjusted  2. Patient will have a decrease in pain to facilitate improvement in movement, function, and ADLs as indicated by Functional Deficits. [x]? Progressing: []? Met: []? Not Met: []? Adjusted     Long Term Goals: To be achieved in: 12 weeks  1. Disability index score of 20% or less for the LEFS to assist with reaching prior level of function. []? Progressing: []? Met: [x]? Not Met: []? Adjusted  2.  Patient will demonstrate increased AROM to at least 0-120 to allow for proper joint functioning as indicated by patients Functional Deficits. [x]? Progressing: []? Met: []? Not Met: []? Adjusted  3. Patient will demonstrate an increase in Strength to at least 4+/5 quad as well as good proximal hip strength and control to allow for proper functional mobility as indicated by patients Functional Deficits. [x]? Progressing: []? Met: []? Not Met: []? Adjusted  4. Patient will return to functional activities including walking w/o assistive device without increased symptoms or restriction. [x]? Progressing: []? Met: []? Not Met: []? Adjusted  5. Patient will be able to return to work full duty without restriction. [x]? Progressing: []? Met: []? Not Met: []? Adjusted         Overall Progression Towards Functional goals/ Treatment Progress Update:  [] Patient is progressing as expected towards functional goals listed. [x] Progression is slowed due to complexities/Impairments listed. [] Progression has been slowed due to co-morbidities. [] Plan just implemented, too soon to assess goals progression <30days   [] Goals require adjustment due to lack of progress  [] Patient is not progressing as expected and requires additional follow up with physician  [] Other    Persisting Functional Limitations/Impairments:  [x]Sitting [x]Standing   [x]Walking [x]Stairs   [x]Transfers [x]ADLs   [x]Squatting/bending [x]Kneeling  [x]Housework [x]Job related tasks  []Driving [x]Sports/Recreation   [x]Sleeping []Other:    ASSESSMENT: Pt. Able to complete all exercises this date without increased symptoms except for theraband around knees, moved to ankles for decreased sensitivity. Pt cont to struggle with normalized gait or progressions with quad strengthening via weighted machines. Fatigues quickly with a few reps of wall sits.  Pt. Continues to require tactile facilitation for quad and appropriate weight shift with step up and to allow for eccentric quad lowering on step down (compensates with hip drop to avoid quad control). Pt. Requires continued progression of skilled therapy in order to restore quad strength and decrease pain for normalized gait. PT recommending 1x/week PT continued over the next 6 weeks, with emphasis on patient attending a gym to get on weighted machines at least 3x/week. Pt states she is unable to get to a gym currently due to her home life not allowing for  of her youngest son. Discuss continuation of 2x/week if unable to get into gym at all. Treatment/Activity Tolerance:  [x] Pt able to complete treatment [] Patient limited by fatique  [] Patient limited by pain  [] Patient limited by other medical complications  [] Other:     Prognosis:  [x] Good [] Fair  [] Poor    Patient Requires Follow-up: [x] Yes  [] No    Return to Play:    [x]  N/A    PLAN: See eval. PT 3x / week for 3 weeks then 2x/week for 9 weeks. [x] Continue per plan of care [] Alter current plan (see comments)  [] Plan of care initiated [] Hold pending MD visit [] Discharge    Electronically signed by: Galina Logan PT, DPT      Note: If patient does not return for scheduled/ recommended follow up visits, this note will serve as a discharge from care along with most recent update on progress.

## 2020-08-26 ENCOUNTER — TELEPHONE (OUTPATIENT)
Dept: ORTHOPEDIC SURGERY | Age: 45
End: 2020-08-26

## 2020-08-27 ENCOUNTER — HOSPITAL ENCOUNTER (OUTPATIENT)
Dept: PHYSICAL THERAPY | Age: 45
Setting detail: THERAPIES SERIES
Discharge: HOME | End: 2020-08-27
Payer: COMMERCIAL

## 2020-08-27 PROCEDURE — 97530 THERAPEUTIC ACTIVITIES: CPT | Performed by: PHYSICAL THERAPIST

## 2020-08-27 PROCEDURE — 97110 THERAPEUTIC EXERCISES: CPT | Performed by: PHYSICAL THERAPIST

## 2020-08-27 NOTE — FLOWSHEET NOTE
Gilda Pearson  Phone: [phone]   Fax: [phone]      Physical Therapy Treatment Note/ Progress Report:     Date:  2020    Patient Name:  Brian Boyle    :  1975  MRN: [id number]  Restrictions/Precautions:    Medical/Treatment Diagnosis Information:  Diagnosis: S83.512 (ICD-10-CM) - Anterior cruciate ligament complete tear, left  Treatment Diagnosis: L knee pain; L knee stiffness; L LE atrophy; L LE swelling; difficulty walking  Insurance/Certification information:  PT Insurance Information: [facility] [place] - visits per medical necessity; no auth  Physician Information:  Referring Practitioner: Dr. Anita Boucher of care signed (Y/N): [x]  Yes []  No     Date of Patient follow up with Physician: 8/3/2020     Progress Report: []  Yes  [x]  No     Date Range for reporting period:  Beginnin2020  Ending:     Progress report due (10 Rx/or 30 days whichever is less): visit #20 or      Recertification due (POC duration/ or 90 days whichever is less): visit #27 or 10/1/2020 (12 weeks)    Visit # Insurance Allowable Auth required? Date Range   16 Medical necessity   &    Not Covered []  Yes  [x]  No n/a     Latex Allergy:  [x]NO      []YES  Preferred Language for Healthcare:   [x]English       []other:    Functional Scale:        Date assessed:  LEFS: raw score = 20/80; dysfunction = 75%  2020    Pain level:  3/10    SUBJECTIVE: Pt. States that her knee continues to be very painful and stiff especially in the morning making it difficult to walk. Pt. Continues to have some non-painful cracking/clicking in knee. Pt. States that she is very frustrated by the persisting pain and functional limitations and is very worried that there is something else wrong.       OBJECTIVE:   :  AD:  [] Bilateral [] Single - longer distances [x] None - controlled environments, around the home  Brace for ambulation: [] No  [x] Yes - Range: unlocked; Functional brace    Gait: WBAT without AD, slowed j luis and decreased step length with extensor lag    Mild effusion persisting      PROM AROM     L R L R   Knee Flexion 126 (ERMI)      Knee Extension         RESTRICTIONS/PRECAUTIONS: ACL repair and menisectomy 6/4/2020     Exercises/Interventions:     Therapeutic Exercise (98825)  Resistance / level Sets/sec Reps Notes / Cues   Bike (seat position 4) ROM 5'     IB - Calf stretch  30\"  3    Long Sitting Hamstring stretch  30\" 3    Prone quad stretch  30\" 3    Heel slides   20\" 5           sidelying hip abduction     bridge    Standing HR   2 10    Leg curls DL 30# 3 10    Leg extension (90-30) DL 10# 3 10 (blocked at 3rd hole for extension)   Leg press (90-10) DL 70#  SL 35# 3  3 10  10 For improved quad control and strengthening   Modified wall sit ~60deg 30\" 4    Stool scoots  1 lap  For improve hamstring strengthening          Therapeutic Activities (23669)        Mini squats  Added 7/17 tactile cueing and quad facilitation   SL sit to stand Added 7/29 cueing throughout for control; 7/31 held due to pain levels, unable to complete   Goblet squats 10#  To chair + airex 2 10 Cueing to maintain knee alignment and equal WB   Step up Tactile facilitation for quad activation   Step down Cue to allow for quad eccentric control by bending knee prior to R foot hitting floor   Lateral stepping with band Lime @ ankles 3 10steps    stairs W/ railing 3 steps 5x Cueing for reciprocal pattern and control especially with descent          Neuromuscular Re-ed (17470)        For quadriceps atrophy and re-education in order to improve quad function and eccentric control which will improve function and QOL. Quad Set  Tactile facilitation      SAQ Wanda from PT to attain full range extension for first set.    SAQ to SLR supine  Added 7/29 - Min quad lag present from SAQ, able to maintain and not lose any extension with addition of SLR    SLR - flexion (semi-reclined)  Manual assist for initiation, cueing throughout to decrease extensor lag   SLR hold Manual assist; 8/17 - held due to pain levels   Standing w/ slight knee flex B ER into PT resistance  For glute med activation to improve performance of sit<>stand transitions from table    TKE w/ DF    biodex balance LOS L10 2x  For improved proprioception and balance                 Manual Intervention (22625)       Manual OP into extension                         Pt. Education:  -all pt questions were answered    Home Exercise Program:  Access Code: [email or web address]    Date: 07/15/2020   Prepared by: Meagan Powell   Exercises   Standing Knee Flexion - 10 reps - 2 sets - 2x daily - 7x weekly   Sidelying Hip Abduction - 10 reps - 2 sets - 2x daily - 7x weekly   Prone Hip Extension - 10 reps - 2 sets - 2x daily - 7x weekly   Seated Long Arc Quad - 10 reps - 2 sets - 2x daily - 7x weekly     Access Code: Raymond Dodd   Date: 07/09/2020   Prepared by: Twiggs Gist   Exercises   Supine Ankle Pumps - 10 reps - 3 sets - 3-5x daily - 7x weekly   Long Sitting Calf Stretch with Strap - 10 reps - 3 sets - 3-5x daily - 7x weekly   Seated Table Hamstring Stretch - 10 reps - 3 sets - 3-5x daily - 7x weekly   Supine Quad Set - 10 reps - 3 sets - 3-5x daily - 7x weekly   Supine Heel Slide with Strap - 10 reps - 3 sets - 3-5x daily - 7x weekly   Seated Knee Flexion AAROM - 10 reps - 3 sets - 3-5x daily - 7x weekly   Supine Knee Flexion AAROM at Wall - 10 reps - 3 sets - 3-5x daily - 7x weekly   Supine Knee Flexion AAROM at Wall - 10 reps - 3 sets - 3-5x daily - 7x weekly     Therapeutic Exercise and NMR EXR  [x] (99247) Provided verbal/tactile cueing for activities related to strengthening, flexibility, endurance, ROM for improvements in LE, proximal hip, and core control with self care, mobility, lifting, ambulation.   [x] (16567) Provided verbal/tactile cueing for activities related to improving balance, coordination, kinesthetic sense, posture, motor skill, proprioception  to assist with LE, proximal hip, and core control in self care, mobility, lifting, ambulation and eccentric single leg control.   [] (93639) Therapist is in constant attendance of 2 or more patients providing skilled therapy interventions, but not providing any significant amount of measurable one-on-one time to either patient, for improvements in LE, proximal hip, and core control in self care, mobility, lifting, ambulation and eccentric single leg control.      NMR and Therapeutic Activities:    [x] (81026 or 17974) Provided verbal/tactile cueing for activities related to improving balance, coordination, kinesthetic sense, posture, motor skill, proprioception and motor activation to allow for proper function of core, proximal hip and LE with self care and ADLs  [] (93396) Gait Re-education- Provided training and instruction to the patient for proper LE, core and proximal hip recruitment and positioning and eccentric body weight control with ambulation re-education including up and down stairs     Home Exercise Program:    [x] (42272) Reviewed/Progressed HEP activities related to strengthening, flexibility, endurance, ROM of core, proximal hip and LE for functional self-care, mobility, lifting and ambulation/stair navigation   [] (63944)Reviewed/Progressed HEP activities related to improving balance, coordination, kinesthetic sense, posture, motor skill, proprioception of core, proximal hip and LE for self care, mobility, lifting, and ambulation/stair navigation      Manual Treatments:  PROM / STM / Oscillations-Mobs:  G-I, II, III, IV (PA's, Inf., Post.)  [x] (83102) Provided manual therapy to mobilize LE, proximal hip and/or LS spine soft tissue/joints for the purpose of modulating pain, promoting relaxation,  increasing ROM, reducing/eliminating soft tissue swelling/inflammation/restriction, improving soft tissue extensibility and allowing for proper ROM for normal function as well as good proximal hip strength and control to allow for proper functional mobility as indicated by patients Functional Deficits. [x]? Progressing: []? Met: []? Not Met: []? Adjusted  4. Patient will return to functional activities including walking w/o assistive device without increased symptoms or restriction. [x]? Progressing: []? Met: []? Not Met: []? Adjusted  5. Patient will be able to return to work full duty without restriction. [x]? Progressing: []? Met: []? Not Met: []? Adjusted         Overall Progression Towards Functional goals/ Treatment Progress Update:  [] Patient is progressing as expected towards functional goals listed. [x] Progression is slowed due to complexities/Impairments listed. [] Progression has been slowed due to co-morbidities. [] Plan just implemented, too soon to assess goals progression <30days   [] Goals require adjustment due to lack of progress  [] Patient is not progressing as expected and requires additional follow up with physician  [] Other    Persisting Functional Limitations/Impairments:  []Sitting [x]Standing   [x]Walking [x]Stairs   []Transfers [x]ADLs   [x]Squatting/bending [x]Kneeling  [x]Housework [x]Job related tasks  []Driving [x]Sports/Recreation   [x]Sleeping []Other:    ASSESSMENT: Pt. Is 12 wks. Post-op at this time. Pt. Continues to have significant pain and weakness limiting function. Again discussed with pt. The importance of progressing strength form improved function and decreased pain. Pt. Would benefit from competing gym program 3x/wk to build strength but pt. Reports she cannot go to the gym at this time with  and has a hold on her gym membership. Pt. Slow moving throughout therapy session this date due to fatigue. Initiated goblet squats for improved functional strength, cueing throughout to maintain knee alignment. Some cracking noted in knee with squats. Able to progress to PRE machines with pt.  Noting significant fatigue and \"shaking\" in leg. Performed stairs with pt. Able to complete reciprocally but with deficits in control, especially eccentrically for descent. Pt. Did well with addition of biodex balance for improved proprioception with minimal UE support required. Pt. Will continue to benefit from skilled therapy in order to restore functional strength for stairs and walking and to decrease symptom with daily activities. Treatment/Activity Tolerance:  [x] Pt able to complete treatment [] Patient limited by fatique  [] Patient limited by pain  [] Patient limited by other medical complications  [] Other:     Prognosis:  [x] Good [] Fair  [] Poor    Patient Requires Follow-up: [x] Yes  [] No    Return to Play:    [x]  N/A    PLAN: See eval. 1-2x/wk     [x] Continue per plan of care [] Alter current plan (see comments)  [] Plan of care initiated [] Hold pending MD visit [] Discharge    Electronically signed by: Dinesh Jay PT, DPT      Note: If patient does not return for scheduled/ recommended follow up visits, this note will serve as a discharge from care along with most recent update on progress.

## 2020-09-04 ENCOUNTER — TELEPHONE (OUTPATIENT)
Dept: ORTHOPEDIC SURGERY | Age: 45
End: 2020-09-04

## 2020-09-04 ENCOUNTER — HOSPITAL ENCOUNTER (OUTPATIENT)
Dept: PHYSICAL THERAPY | Age: 45
Setting detail: THERAPIES SERIES
Discharge: HOME | End: 2020-09-04
Payer: COMMERCIAL

## 2020-09-04 PROCEDURE — G0283 ELEC STIM OTHER THAN WOUND: HCPCS | Performed by: PHYSICAL THERAPIST

## 2020-09-04 PROCEDURE — 97110 THERAPEUTIC EXERCISES: CPT | Performed by: PHYSICAL THERAPIST

## 2020-09-04 NOTE — FLOWSHEET NOTE
Gilda Pearson  Phone: [phone]   Fax: [phone]      Physical Therapy Treatment Note/ Progress Report:     Date:  2020    Patient Name:  Danyel Mc    :  1975  MRN: [id number]  Restrictions/Precautions:    Medical/Treatment Diagnosis Information:  Diagnosis: S83.512 (ICD-10-CM) - Anterior cruciate ligament complete tear, left  Treatment Diagnosis: L knee pain; L knee stiffness; L LE atrophy; L LE swelling; difficulty walking  Insurance/Certification information:  PT Insurance Information: [facility] [place] - visits per medical necessity; no auth  Physician Information:  Referring Practitioner: Dr. Ciro Nava of care signed (Y/N): [x]  Yes []  No     Date of Patient follow up with Physician: 8/3/2020     Progress Report: []  Yes  [x]  No     Date Range for reporting period:  Beginnin2020  Endin20    Progress report due (10 Rx/or 30 days whichever is less): visit #27 or 9008     Recertification due (POC duration/ or 90 days whichever is less): visit #27 or 10/1/2020 (12 weeks)    Visit # Insurance Allowable Auth required? Date Range   17 Medical necessity   &    Not Covered []  Yes  [x]  No n/a     Latex Allergy:  [x]NO      []YES  Preferred Language for Healthcare:   [x]English       []other:    Functional Scale:        Date assessed:  LEFS: raw score = 25/80; dysfunction = 69%  2020    Pain level:  7/10    SUBJECTIVE: Pt. Reports that she is in a lot of pain. It has gradually gotten worse over the past week. She has constant pain and her knee feels like it is going to give out on her. Pt. Has tried soaking her knee without relief. Pt. Notes burning pain with icing. She does not feel that the medication is help. She is unable to sleep because of the pain.      OBJECTIVE:   :  AD:  [] Bilateral [] Single - longer distances [x] None - controlled environments, around the home  Brace for ambulation: [] No  [x] Yes - Range: unlocked; Functional brace    Gait: WBAT without AD, slowed j luis and decreased step length with extensor lag    Mild effusion persisting    No notable skin changes    Sensitivity to light touch      PROM AROM     L R L R   Knee Flexion 122 heel slide      Knee Extension Lacking 2 resting  Lacking 1 w/ QS         RESTRICTIONS/PRECAUTIONS: ACL repair and menisectomy 6/4/2020     Exercises/Interventions:     Therapeutic Exercise (79711)  Resistance / level Sets/sec Reps Notes / Cues   Bike (seat position 4) ROM 5'     IB - Calf stretch  30\"  3    Long Sitting Hamstring stretch  30\" 3    Prone quad stretch  30\" 3    Heel slides   20\" 5           sidelying hip abduction    bridge    Standing HR     Leg curls    Leg extension (90-30) (blocked at 3rd hole for extension)   Leg press (90-10) For improved quad control and strengthening   Modified wall sit    Stool scoots For improve hamstring strengthening          Therapeutic Activities (30957)        Mini squats Added 7/17 tactile cueing and quad facilitation   SL sit to stand Added 7/29 cueing throughout for control; 7/31 held due to pain levels, unable to complete   Goblet squats Cueing to maintain knee alignment and equal WB   Step up Tactile facilitation for quad activation   Step down Cue to allow for quad eccentric control by bending knee prior to R foot hitting floor   Lateral stepping with band    stairs Cueing for reciprocal pattern and control especially with descent          Neuromuscular Re-ed (90972)        For quadriceps atrophy and re-education in order to improve quad function and eccentric control which will improve function and QOL. Quad Set  Tactile facilitation      SAQ Wanda from PT to attain full range extension for first set.    SAQ to SLR supine  Added 7/29 - Min quad lag present from SAQ, able to maintain and not lose any extension with addition of SLR    SLR - flexion (semi-reclined)  Manual assist for initiation, cueing throughout to decrease extensor lag   SLR hold Manual assist; 8/17 - held due to pain levels   Standing w/ slight knee flex B ER into PT resistance  For glute med activation to improve performance of sit<>stand transitions from table    TKE w/ DF    biodex balance  For improved proprioception and balance                 Manual Intervention (93907)       Manual OP into extension       mob Patellar  tib/fem 4'  Gentle for pain modulation              Pt. Education:  -all pt questions were answered    Home Exercise Program:  Access Code: [email or web address]    Date: 07/15/2020   Prepared by: Meagan Powell   Exercises   Standing Knee Flexion - 10 reps - 2 sets - 2x daily - 7x weekly   Sidelying Hip Abduction - 10 reps - 2 sets - 2x daily - 7x weekly   Prone Hip Extension - 10 reps - 2 sets - 2x daily - 7x weekly   Seated Long Arc Quad - 10 reps - 2 sets - 2x daily - 7x weekly     Access Code: Raymond Dodd   Date: 07/09/2020   Prepared by: Van Zandt Gist   Exercises   Supine Ankle Pumps - 10 reps - 3 sets - 3-5x daily - 7x weekly   Long Sitting Calf Stretch with Strap - 10 reps - 3 sets - 3-5x daily - 7x weekly   Seated Table Hamstring Stretch - 10 reps - 3 sets - 3-5x daily - 7x weekly   Supine Quad Set - 10 reps - 3 sets - 3-5x daily - 7x weekly   Supine Heel Slide with Strap - 10 reps - 3 sets - 3-5x daily - 7x weekly   Seated Knee Flexion AAROM - 10 reps - 3 sets - 3-5x daily - 7x weekly   Supine Knee Flexion AAROM at Wall - 10 reps - 3 sets - 3-5x daily - 7x weekly   Supine Knee Flexion AAROM at Wall - 10 reps - 3 sets - 3-5x daily - 7x weekly     Therapeutic Exercise and NMR EXR  [x] (34496) Provided verbal/tactile cueing for activities related to strengthening, flexibility, endurance, ROM for improvements in LE, proximal hip, and core control with self care, mobility, lifting, ambulation.   [x] (85945) Provided verbal/tactile cueing for activities related to improving balance, coordination, kinesthetic sense, posture, motor skill, proprioception  to assist with LE, proximal hip, and core control in self care, mobility, lifting, ambulation and eccentric single leg control.   [] (71559) Therapist is in constant attendance of 2 or more patients providing skilled therapy interventions, but not providing any significant amount of measurable one-on-one time to either patient, for improvements in LE, proximal hip, and core control in self care, mobility, lifting, ambulation and eccentric single leg control.      NMR and Therapeutic Activities:    [x] (04960 or 12825) Provided verbal/tactile cueing for activities related to improving balance, coordination, kinesthetic sense, posture, motor skill, proprioception and motor activation to allow for proper function of core, proximal hip and LE with self care and ADLs  [] (37306) Gait Re-education- Provided training and instruction to the patient for proper LE, core and proximal hip recruitment and positioning and eccentric body weight control with ambulation re-education including up and down stairs     Home Exercise Program:    [x] (63441) Reviewed/Progressed HEP activities related to strengthening, flexibility, endurance, ROM of core, proximal hip and LE for functional self-care, mobility, lifting and ambulation/stair navigation   [] (10012)Reviewed/Progressed HEP activities related to improving balance, coordination, kinesthetic sense, posture, motor skill, proprioception of core, proximal hip and LE for self care, mobility, lifting, and ambulation/stair navigation      Manual Treatments:  PROM / STM / Oscillations-Mobs:  G-I, II, III, IV (PA's, Inf., Post.)  [x] (64989) Provided manual therapy to mobilize LE, proximal hip and/or LS spine soft tissue/joints for the purpose of modulating pain, promoting relaxation,  increasing ROM, reducing/eliminating soft tissue swelling/inflammation/restriction, improving soft tissue extensibility and allowing for proper ROM for normal function with self care, mobility, lifting and ambulation. Modalities:  [] (56491) Vasopneumatic compression: Utilized vasopneumatic compression to decrease edema / swelling for the purpose of improving mobility and quad tone / recruitment which will allow for increased overall function including but not limited to self-care, transfers, ambulation, and ascending / descending stairs. Modalities:  IFC to knee - 10'    Charges:  Timed Code Treatment Minutes: 20   Total Treatment Minutes: 40     [] EVAL - LOW (65668)   [] EVAL - MOD (48737)  [] EVAL - HIGH (04370)  [] RE-EVAL (59052)  [x] AK(71726) x 1      [] Ionto  [] NMR (00906) x       [] Vaso  [] Manual (67389) x       [] Ultrasound  [] TA x         [] Mech Traction (38338)  [] Aquatic Therapy x      [x] ES (un) (62140):   [] Home Management Training x  [] ES(attended) (72216)   [] Dry Needling 1-2 muscles (89850):  [] Dry Needling 3+ muscles (958236  [] Group:      [] Other:     GOALS:  Patient stated goal: improve ROM and return to normal   [x]? Progressing: []? Met: []? Not Met: []? Adjusted     Therapist goals for Patient:   Short Term Goals: To be achieved in: 2 weeks  1. Independent in HEP and progression per patient tolerance, in order to prevent re-injury. []? Progressing: [x]? Met: []? Not Met: []? Adjusted  2. Patient will have a decrease in pain to facilitate improvement in movement, function, and ADLs as indicated by Functional Deficits. []? Progressing: []? Met: [x]? Not Met: []? Adjusted     Long Term Goals: To be achieved in: 12 weeks  1. Disability index score of 20% or less for the LEFS to assist with reaching prior level of function. []? Progressing: []? Met: [x]? Not Met: []? Adjusted  2. Patient will demonstrate increased AROM to at least 0-120 to allow for proper joint functioning as indicated by patients Functional Deficits. [x]? Progressing: []? Met: []? Not Met: []? Adjusted  3.  Patient will demonstrate an increase in Strength to at least 4+/5 quad as well as good proximal hip strength and control to allow for proper functional mobility as indicated by patients Functional Deficits. []? Progressing: []? Met: [x]? Not Met: []? Adjusted  4. Patient will return to functional activities including walking w/o assistive device without increased symptoms or restriction. [x]? Progressing: []? Met: []? Not Met: []? Adjusted  5. Patient will be able to return to work full duty without restriction. [x]? Progressing: []? Met: []? Not Met: []? Adjusted         Overall Progression Towards Functional goals/ Treatment Progress Update:  [] Patient is progressing as expected towards functional goals listed. [x] Progression is slowed due to complexities/Impairments listed. [] Progression has been slowed due to co-morbidities. [] Plan just implemented, too soon to assess goals progression <30days   [] Goals require adjustment due to lack of progress  [] Patient is not progressing as expected and requires additional follow up with physician  [] Other    Persisting Functional Limitations/Impairments:  []Sitting [x]Standing   [x]Walking [x]Stairs   []Transfers [x]ADLs   [x]Squatting/bending [x]Kneeling  [x]Housework [x]Job related tasks  []Driving [x]Sports/Recreation   [x]Sleeping []Other:    ASSESSMENT: Pt. Into therapy session with severe pain this date. Per MD note there is concern for complex regional pain syndrome at this time which pt. Demonstrates multiple signs and symptoms. Limited therapy this date to gentle stretching and ROM. Performed estim for pain control. Discussed possibility of doing aquatic therapy in the future for further symptom modulation. PT informed referring MD of changes.       Treatment/Activity Tolerance:  [] Pt able to complete treatment [] Patient limited by fatique  [x] Patient limited by pain  [] Patient limited by other medical complications  [] Other:     Prognosis:  [x] Good [] Fair  [] Poor    Patient Requires Follow-up: [x] Yes  [] No    Return to Play:    [x]  N/A    PLAN: See eval. 1-2x/wk     [x] Continue per plan of care [] Alter current plan (see comments)  [] Plan of care initiated [] Hold pending MD visit [] Discharge    Electronically signed by: Megha Sebastian PT, DPT      Note: If patient does not return for scheduled/ recommended follow up visits, this note will serve as a discharge from care along with most recent update on progress.

## 2020-09-04 NOTE — TELEPHONE ENCOUNTER
Spoke to patient and informed her that paperwork has been updated and refaxed. She stated while on the call that her knee pain is a constant 7/0,she is unable to flex or extend her knee, and she can't sleep due to the pain. She also stated that the gabapentin Pool prescribed is not helping at all. LVM for patient to call back regarding testing Dr Mary Artis is ordering for this.

## 2020-09-04 NOTE — TELEPHONE ENCOUNTER
SPoke to patient and let her know to call and schedule bone scan and to have lab work done. She verbalized understanding.

## 2020-09-09 ENCOUNTER — HOSPITAL ENCOUNTER (OUTPATIENT)
Dept: PHYSICAL THERAPY | Age: 45
Setting detail: THERAPIES SERIES
Discharge: HOME | End: 2020-09-09
Payer: COMMERCIAL

## 2020-09-09 ENCOUNTER — HOSPITAL ENCOUNTER (OUTPATIENT)
Age: 45
Discharge: HOME | End: 2020-09-09
Payer: COMMERCIAL

## 2020-09-09 ENCOUNTER — HOSPITAL ENCOUNTER (OUTPATIENT)
Dept: NUCLEAR MEDICINE | Age: 45
Discharge: HOME | End: 2020-09-09
Payer: COMMERCIAL

## 2020-09-09 LAB
ANION GAP SERPL CALCULATED.3IONS-SCNC: 14 MMOL/L (ref 3–16)
BASOPHILS ABSOLUTE: 0 K/UL (ref 0–0.2)
BASOPHILS RELATIVE PERCENT: 0.9 %
BUN BLDV-MCNC: 14 MG/DL (ref 7–20)
C-REACTIVE PROTEIN: 0.4 MG/L (ref 0–5.1)
CALCIUM SERPL-MCNC: 9.9 MG/DL (ref 8.3–10.6)
CHLORIDE BLD-SCNC: 100 MMOL/L (ref 99–110)
CO2: 26 MMOL/L (ref 21–32)
CREAT SERPL-MCNC: <0.5 MG/DL (ref 0.6–1.1)
EOSINOPHILS ABSOLUTE: 0.1 K/UL (ref 0–0.6)
EOSINOPHILS RELATIVE PERCENT: 2.3 %
GFR AFRICAN AMERICAN: >60
GFR NON-AFRICAN AMERICAN: >60
GLUCOSE BLD-MCNC: 88 MG/DL (ref 70–99)
HCT VFR BLD CALC: 44.6 % (ref 36–48)
HEMOGLOBIN: 14.9 G/DL (ref 12–16)
LYMPHOCYTES ABSOLUTE: 1.7 K/UL (ref 1–5.1)
LYMPHOCYTES RELATIVE PERCENT: 29.9 %
MCH RBC QN AUTO: 30 PG (ref 26–34)
MCHC RBC AUTO-ENTMCNC: 33.4 G/DL (ref 31–36)
MCV RBC AUTO: 89.8 FL (ref 80–100)
MONOCYTES ABSOLUTE: 0.5 K/UL (ref 0–1.3)
MONOCYTES RELATIVE PERCENT: 9.3 %
NEUTROPHILS ABSOLUTE: 3.3 K/UL (ref 1.7–7.7)
NEUTROPHILS RELATIVE PERCENT: 57.6 %
PDW BLD-RTO: 13.1 % (ref 12.4–15.4)
PLATELET # BLD: 236 K/UL (ref 135–450)
PMV BLD AUTO: 9 FL (ref 5–10.5)
POTASSIUM SERPL-SCNC: 4.4 MMOL/L (ref 3.5–5.1)
RBC # BLD: 4.96 M/UL (ref 4–5.2)
SEDIMENTATION RATE, ERYTHROCYTE: 6 MM/HR (ref 0–20)
SODIUM BLD-SCNC: 140 MMOL/L (ref 136–145)
URIC ACID, SERUM: 4.6 MG/DL (ref 2.6–6)
WBC # BLD: 5.7 K/UL (ref 4–11)

## 2020-09-09 PROCEDURE — 3430000000 HC RX DIAGNOSTIC RADIOPHARMACEUTICAL: Performed by: PHYSICIAN ASSISTANT

## 2020-09-09 PROCEDURE — 86140 C-REACTIVE PROTEIN: CPT

## 2020-09-09 PROCEDURE — 78803 RP LOCLZJ TUM SPECT 1 AREA: CPT

## 2020-09-09 PROCEDURE — G0283 ELEC STIM OTHER THAN WOUND: HCPCS

## 2020-09-09 PROCEDURE — 85025 COMPLETE CBC W/AUTO DIFF WBC: CPT

## 2020-09-09 PROCEDURE — 97110 THERAPEUTIC EXERCISES: CPT

## 2020-09-09 PROCEDURE — 78315 BONE IMAGING 3 PHASE: CPT

## 2020-09-09 PROCEDURE — A9503 TC99M MEDRONATE: HCPCS | Performed by: PHYSICIAN ASSISTANT

## 2020-09-09 PROCEDURE — 36415 COLL VENOUS BLD VENIPUNCTURE: CPT

## 2020-09-09 PROCEDURE — 80048 BASIC METABOLIC PNL TOTAL CA: CPT

## 2020-09-09 PROCEDURE — 85652 RBC SED RATE AUTOMATED: CPT

## 2020-09-09 PROCEDURE — 84550 ASSAY OF BLOOD/URIC ACID: CPT

## 2020-09-09 RX ORDER — TC 99M MEDRONATE 20 MG/10ML
26.7 INJECTION, POWDER, LYOPHILIZED, FOR SOLUTION INTRAVENOUS
Status: COMPLETED | OUTPATIENT
Start: 2020-09-09 | End: 2020-09-09

## 2020-09-09 RX ADMIN — TC 99M MEDRONATE 26.7 MILLICURIE: 20 INJECTION, POWDER, LYOPHILIZED, FOR SOLUTION INTRAVENOUS at 10:08

## 2020-09-09 NOTE — FLOWSHEET NOTE
[facility]  Phone: [phone]   Fax: [phone]      Physical Therapy Treatment Note/ Progress Report:     Date:  2020    Patient Name:  Court Mike    :  1975  MRN: [id number]  Restrictions/Precautions:    Medical/Treatment Diagnosis Information:  Diagnosis: S83.512 (ICD-10-CM) - Anterior cruciate ligament complete tear, left  Treatment Diagnosis: L knee pain; L knee stiffness; L LE atrophy; L LE swelling; difficulty walking  Insurance/Certification information:  PT Insurance Information: [employer] [place] - visits per medical necessity; no auth  Physician Information:  Referring Practitioner: Dr. Juan Vee of care signed (Y/N): [x]  Yes []  No     Date of Patient follow up with Physician: 2020     Progress Report: []  Yes  [x]  No     Date Range for reporting period:  Beginnin2020  Endin20    Progress report due (10 Rx/or 30 days whichever is less): visit #27 or      Recertification due (POC duration/ or 90 days whichever is less): visit #27 or 10/1/2020 (12 weeks)    Visit # Insurance Allowable Auth required? Date Range   18 Medical necessity   &    Not Covered []  Yes  [x]  No n/a     Latex Allergy:  [x]NO      []YES  Preferred Language for Healthcare:   [x]English       []other:    Functional Scale:        Date assessed:  LEFS: raw score = 25/80; dysfunction = 69%  2020    Pain level:  610    SUBJECTIVE: Pt. Reports her pain continues to be higher, but it is mostly in the morning and at night. States it was [place] little better\" after the last session, but pain came back on the next day. States she has scans and blood work getting done per MD request to assess for CRPS. OBJECTIVE:   :  AD:  [] Bilateral [] Single - longer distances [x] None - controlled environments, around the home  Brace for ambulation: [] No  [x] Yes - Range: unlocked;  Functional brace    Gait: WBAT without AD, slowed j luis and decreased step length with extensor lag    Mild effusion persisting    No notable skin changes    Sensitivity to light touch      PROM AROM     L R L R   Knee Flexion 122 heel slide      Knee Extension Lacking 2 resting  Lacking 1 w/ QS         RESTRICTIONS/PRECAUTIONS: ACL repair and menisectomy 6/4/2020     Exercises/Interventions:     Therapeutic Exercise (04121)  Resistance / level Sets/sec Reps Notes / Cues   Bike (seat position 4) ROM 7'     IB - Calf stretch  30\"  3    Long Sitting Hamstring stretch  30\" 3    Prone quad stretch  30\" 3    Heel slides   20\" 5           sidelying hip abduction    bridge    Standing HR     Leg curls    Leg extension (90-30) (blocked at 3rd hole for extension)   Leg press (90-10) For improved quad control and strengthening   Modified wall sit    Stool scoots For improve hamstring strengthening          Therapeutic Activities (48231)        Mini squats Added 7/17 tactile cueing and quad facilitation   SL sit to stand Added 7/29 cueing throughout for control; 7/31 held due to pain levels, unable to complete   Goblet squats Cueing to maintain knee alignment and equal WB   Step up Tactile facilitation for quad activation   Step down Cue to allow for quad eccentric control by bending knee prior to R foot hitting floor   Lateral stepping with band    stairs Cueing for reciprocal pattern and control especially with descent          Neuromuscular Re-ed (81588)        For quadriceps atrophy and re-education in order to improve quad function and eccentric control which will improve function and QOL. Quad Set  Tactile facilitation      SAQ Wanda from PT to attain full range extension for first set.    SAQ to SLR supine  Added 7/29 - Min quad lag present from SAQ, able to maintain and not lose any extension with addition of SLR    SLR - flexion (semi-reclined)  Manual assist for initiation, cueing throughout to decrease extensor lag   SLR hold Manual assist; 8/17 - held due to pain levels   Standing w/ slight knee flex B ER into PT resistance  For glute med activation to improve performance of sit<>stand transitions from table    TKE w/ DF    biodex balance  For improved proprioception and balance                 Manual Intervention (31099)       Manual OP into extension       mob  Gentle for pain modulation              Pt. Education:  -all pt questions were answered    Home Exercise Program:  Access Code: [email or web address]    Date: 07/15/2020   Prepared by: Nella Goodman   Exercises   Standing Knee Flexion - 10 reps - 2 sets - 2x daily - 7x weekly   Sidelying Hip Abduction - 10 reps - 2 sets - 2x daily - 7x weekly   Prone Hip Extension - 10 reps - 2 sets - 2x daily - 7x weekly   Seated Long Arc Quad - 10 reps - 2 sets - 2x daily - 7x weekly     Access Code: Zehramargaux Keyla   Date: 07/09/2020   Prepared by: Paul Cordero   Exercises   Supine Ankle Pumps - 10 reps - 3 sets - 3-5x daily - 7x weekly   Long Sitting Calf Stretch with Strap - 10 reps - 3 sets - 3-5x daily - 7x weekly   Seated Table Hamstring Stretch - 10 reps - 3 sets - 3-5x daily - 7x weekly   Supine Quad Set - 10 reps - 3 sets - 3-5x daily - 7x weekly   Supine Heel Slide with Strap - 10 reps - 3 sets - 3-5x daily - 7x weekly   Seated Knee Flexion AAROM - 10 reps - 3 sets - 3-5x daily - 7x weekly   Supine Knee Flexion AAROM at Wall - 10 reps - 3 sets - 3-5x daily - 7x weekly   Supine Knee Flexion AAROM at Wall - 10 reps - 3 sets - 3-5x daily - 7x weekly     Therapeutic Exercise and NMR EXR  [x] (71960) Provided verbal/tactile cueing for activities related to strengthening, flexibility, endurance, ROM for improvements in LE, proximal hip, and core control with self care, mobility, lifting, ambulation.   [x] (32124) Provided verbal/tactile cueing for activities related to improving balance, coordination, kinesthetic sense, posture, motor skill, proprioception  to assist with LE, proximal hip, and core control in self care, mobility, lifting, ambulation and eccentric single leg control.   [] (07168) Therapist is in constant attendance of 2 or more patients providing skilled therapy interventions, but not providing any significant amount of measurable one-on-one time to either patient, for improvements in LE, proximal hip, and core control in self care, mobility, lifting, ambulation and eccentric single leg control. NMR and Therapeutic Activities:    [x] (15466 or 33393) Provided verbal/tactile cueing for activities related to improving balance, coordination, kinesthetic sense, posture, motor skill, proprioception and motor activation to allow for proper function of core, proximal hip and LE with self care and ADLs  [] (10829) Gait Re-education- Provided training and instruction to the patient for proper LE, core and proximal hip recruitment and positioning and eccentric body weight control with ambulation re-education including up and down stairs     Home Exercise Program:    [x] (75232) Reviewed/Progressed HEP activities related to strengthening, flexibility, endurance, ROM of core, proximal hip and LE for functional self-care, mobility, lifting and ambulation/stair navigation   [] (18863)Reviewed/Progressed HEP activities related to improving balance, coordination, kinesthetic sense, posture, motor skill, proprioception of core, proximal hip and LE for self care, mobility, lifting, and ambulation/stair navigation      Manual Treatments:  PROM / STM / Oscillations-Mobs:  G-I, II, III, IV (PA's, Inf., Post.)  [x] (28521) Provided manual therapy to mobilize LE, proximal hip and/or LS spine soft tissue/joints for the purpose of modulating pain, promoting relaxation,  increasing ROM, reducing/eliminating soft tissue swelling/inflammation/restriction, improving soft tissue extensibility and allowing for proper ROM for normal function with self care, mobility, lifting and ambulation.      Modalities:  [] (76951) Vasopneumatic compression: Utilized vasopneumatic compression to decrease edema / swelling for the purpose of improving mobility and quad tone / recruitment which will allow for increased overall function including but not limited to self-care, transfers, ambulation, and ascending / descending stairs. Modalities:  IFC to knee - 10' @ 12.0 amps    Charges:  Timed Code Treatment Minutes: 20   Total Treatment Minutes: 30     [] EVAL - LOW (45229)   [] EVAL - MOD (07957)  [] EVAL - HIGH (42878)  [] RE-EVAL (66024)  [x] HO(92356) x 1      [] Ionto  [] NMR (84230) x       [] Vaso  [] Manual (33052) x       [] Ultrasound  [] TA x         [] Mech Traction (11858)  [] Aquatic Therapy x      [x] ES (un) (98920):   [] Home Management Training x  [] ES(attended) (85962)   [] Dry Needling 1-2 muscles (86657):  [] Dry Needling 3+ muscles (516963  [] Group:      [] Other:     GOALS:  Patient stated goal: improve ROM and return to normal   [x]? Progressing: []? Met: []? Not Met: []? Adjusted     Therapist goals for Patient:   Short Term Goals: To be achieved in: 2 weeks  1. Independent in HEP and progression per patient tolerance, in order to prevent re-injury. []? Progressing: [x]? Met: []? Not Met: []? Adjusted  2. Patient will have a decrease in pain to facilitate improvement in movement, function, and ADLs as indicated by Functional Deficits. []? Progressing: []? Met: [x]? Not Met: []? Adjusted     Long Term Goals: To be achieved in: 12 weeks  1. Disability index score of 20% or less for the LEFS to assist with reaching prior level of function. []? Progressing: []? Met: [x]? Not Met: []? Adjusted  2. Patient will demonstrate increased AROM to at least 0-120 to allow for proper joint functioning as indicated by patients Functional Deficits. [x]? Progressing: []? Met: []? Not Met: []? Adjusted  3.  Patient will demonstrate an increase in Strength to at least 4+/5 quad as well as good proximal hip strength and control to allow for proper functional mobility as indicated by patients Functional Deficits. []? Progressing: []? Met: [x]? Not Met: []? Adjusted  4. Patient will return to functional activities including walking w/o assistive device without increased symptoms or restriction. [x]? Progressing: []? Met: []? Not Met: []? Adjusted  5. Patient will be able to return to work full duty without restriction. [x]? Progressing: []? Met: []? Not Met: []? Adjusted         Overall Progression Towards Functional goals/ Treatment Progress Update:  [] Patient is progressing as expected towards functional goals listed. [x] Progression is slowed due to complexities/Impairments listed. [] Progression has been slowed due to co-morbidities. [] Plan just implemented, too soon to assess goals progression <30days   [] Goals require adjustment due to lack of progress  [] Patient is not progressing as expected and requires additional follow up with physician  [] Other    Persisting Functional Limitations/Impairments:  []Sitting [x]Standing   [x]Walking [x]Stairs   []Transfers [x]ADLs   [x]Squatting/bending [x]Kneeling  [x]Housework [x]Job related tasks  []Driving [x]Sports/Recreation   [x]Sleeping []Other:    ASSESSMENT: Pt. Into therapy session with increased pain this date. Per MD note there is concern for complex regional pain syndrome at this time which pt. Demonstrates multiple signs and symptoms. Limited therapy this date to gentle stretching and ROM. Performed estim for pain control. Discussed possibility of doing aquatic therapy in the future for further symptom modulation. Cont PT at this time.        Treatment/Activity Tolerance:  [] Pt able to complete treatment [] Patient limited by fatique  [x] Patient limited by pain  [] Patient limited by other medical complications  [] Other:     Prognosis:  [x] Good [] Fair  [] Poor    Patient Requires Follow-up: [x] Yes  [] No    Return to Play:    [x]  N/A    PLAN: See magdaleno. 1-2x/wk     [x] Continue per plan of care [] Alter current plan (see comments)  [] Plan of care initiated [] Hold pending MD visit [] Discharge    Electronically signed by: Opal Hernandez PT, DPT      Note: If patient does not return for scheduled/ recommended follow up visits, this note will serve as a discharge from care along with most recent update on progress.

## 2020-09-14 ENCOUNTER — OFFICE VISIT (OUTPATIENT)
Dept: ORTHOPEDIC SURGERY | Age: 45
End: 2020-09-14
Payer: COMMERCIAL

## 2020-09-14 ENCOUNTER — TELEPHONE (OUTPATIENT)
Dept: ORTHOPEDIC SURGERY | Age: 45
End: 2020-09-14

## 2020-09-14 VITALS — BODY MASS INDEX: 24.8 KG/M2 | WEIGHT: 140 LBS | HEIGHT: 63 IN

## 2020-09-14 PROCEDURE — 99213 OFFICE O/P EST LOW 20 MIN: CPT | Performed by: ORTHOPAEDIC SURGERY

## 2020-09-14 RX ORDER — TRAMADOL HYDROCHLORIDE 50 MG/1
50 TABLET ORAL EVERY 6 HOURS PRN
Qty: 35 TABLET | Refills: 0 | Status: SHIPPED | OUTPATIENT
Start: 2020-09-14 | End: 2020-09-21

## 2020-09-14 RX ORDER — ALENDRONATE SODIUM 70 MG/1
70 TABLET ORAL
Qty: 4 TABLET | Refills: 3 | Status: SHIPPED | OUTPATIENT
Start: 2020-09-14 | End: 2022-05-04

## 2020-09-15 NOTE — PROGRESS NOTES
2020    TELEHEALTH EVALUATION -- Audio/Visual (During QFJAK-94 public health emergency)    HPI:    Bryon Stewart (:  1975) has requested an audio/video evaluation for the following concern(s):    Follow-up regarding her test results for bone scan of her left knee. She is just over 3 months status post left knee autograft bone patella tendon bone ACL reconstruction and medial meniscus repair. At her last visit her main issue was hypersensitivity and persistent severe pain in the knee limiting her daily functions. She continues to be unable to return to her usual duties at work. She has been working in physical therapy but the hypersensitivity has persisted. We ordered a bone scan for evaluation of potential complex regional pain syndrome. She has been on gabapentin twice daily with limited benefit. No new falls or direct trauma. She reports pain both near terminal extension and on flexion of the knee. Pain is continuously interfering with her quality of life. The knee feels heavy to her on active extension like a  band is around her knee. Prior to Visit Medications    Medication Sig Taking? Authorizing Provider   alendronate (FOSAMAX) 70 MG tablet Take 1 tablet by mouth every 7 days Take once per week in the morning with a full glass of water, on an empty stomach, and do not take anything else by mouth or lie down for the next 30 minutes. Yes Brigette Moseley MD   gabapentin (NEURONTIN) 300 MG capsule Take 1 capsule by mouth 2 times daily for 30 days. Intended supply: 90 days Yes Juan Card PA-C   omeprazole (PRILOSEC) 20 MG delayed release capsule Take 20 mg by mouth Daily Yes Historical Provider, MD   traMADol (ULTRAM) 50 MG tablet Take 1 tablet by mouth every 6 hours as needed for Pain (MAY TAKE 2 TABS EVERY 6 HOURS FOR SEVERE PAIN) for up to 7 days.   Juan Card PA-C       Social History     Tobacco Use    Smoking status: Never Smoker    Smokeless tobacco: Never Used   Substance Use Topics    Alcohol use: Yes     Comment: socially    Drug use: No        PHYSICAL EXAMINATION:    Constitutional: [x] Appears well-developed and well-nourished [x] No apparent distress      [] Abnormal-   Mental status  [x] Alert and awake  [x] Oriented to person/place/time [x]Able to follow commands      Eyes:  EOM    [x]  Normal  [] Abnormal-  Sclera  [x]  Normal  [] Abnormal -         Discharge [x]  None visible  [] Abnormal -    HENT:   [x] Normocephalic, atraumatic. [] Abnormal   [x] Mouth/Throat: Mucous membranes are moist.     External Ears [x] Normal  [] Abnormal-     Neck: [x] No visualized mass     Pulmonary/Chest: [x] Respiratory effort normal.  [x] No visualized signs of difficulty breathing or respiratory distress        [] Abnormal-      Musculoskeletal:   [] Normal gait with no signs of ataxia         [] Normal range of motion of neck        [] Abnormal-       Neurological:        [x] No Facial Asymmetry (Cranial nerve 7 motor function) (limited exam to video visit)          [x] No gaze palsy        [] Abnormal-         Skin:        [x] No significant exanthematous lesions or discoloration noted on facial skin         [] Abnormal-            Psychiatric:       [x] Normal Affect [x] No Hallucinations        [] Abnormal-     Bone Scan results: Moderate asymmetric activity is seen about the left knee, including active    early phase inflammation.  The activity is greatest at sites of surgery    within the left lateral femoral condyle and proximal tibia, as well as within    the anterior left patella.  Recent postoperative change can produce this    appearance given patient history of such.  Infection cannot be excluded in    the appropriate clinical setting.         Mild degenerative change of the right knee.       ESR 6 and CRP 0.4 on 9/9/2020     ASSESSMENT/PLAN:  1. Rupture of anterior cruciate ligament of left knee, subsequent encounter  Persistent pain interfering with her daily activities and hypersensitivity clinically consistent with complex regional pain syndrome type I. To continue ruling out other causes of her pain and dysfunction we will check an MRI of her knee to assess healing and position of her graft. Otherwise due to the hyper activity in her surgical regions of the distal femur and proximal tibia as well as patella she may do better to stabilize these areas with bisphosphonate therapy. Reviewed the nature of Fosamax as well as its risks and benefits and she will start this today. I also agree with her therapist that she can transition to more aquatic therapy which may help-reduce the sensitivity and provide other stimulus to improve her knee function. Once we have results of her MRI we will contact her for further evaluation and assessment. Currently she is slated to return to work October 5 but that will likely get pushed back once we have results from her MRI. MRI KNEE LEFT WO CONTRAST; Future    Danyel Mc is a 39 y.o. female being evaluated by a Virtual Visit (video visit) encounter to address concerns as mentioned above. A caregiver was present when appropriate. Due to this being a TeleHealth encounter (During SBALE-05 public health emergency), evaluation of the following organ systems was limited: Vitals/Constitutional/EENT/Resp/CV/GI//MS/Neuro/Skin/Heme-Lymph-Imm. Pursuant to the emergency declaration under the [address] [place] [address] authority and the [employer] Act, this Virtual Visit was conducted with patient's (and/or legal guardian's) consent, to reduce the patient's risk of exposure to COVID-19 and provide necessary medical care. The patient (and/or legal guardian) has also been advised to contact this office for worsening conditions or problems, and seek emergency medical treatment and/or call 911 if deemed necessary. Patient identification was verified at the start of the visit: Yes    Total time spent on this encounter: 11 minutes    Services were provided through a video synchronous discussion virtually to substitute for in-person clinic visit. Patient and provider were located at their individual homes. --Bhavna Adames MD on 9/15/2020 at 12:51 PM    An electronic signature was used to authenticate this note.

## 2020-09-16 ENCOUNTER — HOSPITAL ENCOUNTER (OUTPATIENT)
Dept: PHYSICAL THERAPY | Age: 45
Setting detail: THERAPIES SERIES
Discharge: HOME | End: 2020-09-16
Payer: COMMERCIAL

## 2020-09-16 ENCOUNTER — TELEPHONE (OUTPATIENT)
Dept: ORTHOPEDIC SURGERY | Age: 45
End: 2020-09-16

## 2020-09-16 PROCEDURE — G0283 ELEC STIM OTHER THAN WOUND: HCPCS

## 2020-09-16 PROCEDURE — 97110 THERAPEUTIC EXERCISES: CPT

## 2020-09-16 PROCEDURE — 97530 THERAPEUTIC ACTIVITIES: CPT

## 2020-09-16 NOTE — TELEPHONE ENCOUNTER
Patient called in to advise that her bone scanned was denied. Would like a call to discus [facility] [place].  [phone]

## 2020-09-16 NOTE — PLAN OF CARE
Dionne Pearson  Phone: [phone]   Fax: [phone]     Physical Therapy Re-Certification Plan of Care    Dear Dr. Jessie Sharma  ,    We had the pleasure of treating the following patient for physical therapy services at [address] [place] [address]. A summary of our findings can be found in the updated assessment below. This includes our plan of care. If you have any questions or concerns regarding these findings, please do not hesitate to contact me at the office phone number checked above. Thank you for the referral.     Physician Signature:________________________________Date:__________________  By signing above (or electronic signature), therapists plan is approved by physician      Functional Outcome: LEFS: raw score = 25/80; dysfunction = 69% (9/4/2020)    Overall Response to Treatment:   []Patient is responding well to treatment and improvement is noted with regards  to goals   []Patient should continue to improve in reasonable time if they continue HEP   []Patient has plateaued and is no longer responding to skilled PT intervention    []Patient is getting worse and would benefit from return to referring MD   []Patient unable to adhere to initial POC   [x]Other: Pt transitioning to aquatic therapy per PT and MD recommendations secondary to diagnosis of CRPS post ACL reconstruction. Pt encouraged to continue light HEP including stretching and knee motion that is pain limiting. Pt just started new medication Fosamax 1x/week and will assess pain reduction after a few uses. Pt scheduled for aquatic therapy 1-2x/week for 4-5 weeks with return to land at end of aquatic trial.     Date range of Visits: 7/31/2020-9/16/2020  Total Visits: 19    Recommendation:    [x]Continue PT 1-2x / wk for 4-6 weeks.  Initiate aquatic therapy   []Hold PT, pending MD visit        Physical Therapy Treatment Note/ Progress Report:     Date:  9/16/2020    Patient Name:  Doron Foss    :  1975  MRN: [id number]  Restrictions/Precautions:    Medical/Treatment Diagnosis Information:  Diagnosis: S83.512 (ICD-10-CM) - Anterior cruciate ligament complete tear, left  Treatment Diagnosis: L knee pain; L knee stiffness; L LE atrophy; L LE swelling; difficulty walking  Insurance/Certification information:  PT Insurance Information: Med [place] - visits per medical necessity; no auth  Physician Information:  Referring Practitioner: Dr. Asia Carmichael of care signed (Y/N): [x]  Yes []  No     Date of Patient follow up with Physician: NURIAD     Progress Report: [x]  Yes  []  No     Date Range for reporting period:  Beginnin2020  Endin20    Progress report due (10 Rx/or 30 days whichever is less): visit #29 or      Recertification due (POC duration/ or 90 days whichever is less): visit #30 or 10/28/2020 (6 weeks)    Visit # Insurance Allowable Auth required? Date Range   19 Medical necessity   &    Not Covered []  Yes  [x]  No n/a     Latex Allergy:  [x]NO      []YES  Preferred Language for Healthcare:   [x]English       []other:    Functional Scale:        Date assessed:  LEFS: raw score = 25/80; dysfunction = 69%  2020    Pain level:  3/10    SUBJECTIVE: Pt. Reports her pain continues to be higher, but it is mostly in the morning and at night. States it was [place] little better\" after the last session, but pain came back on the next day. States     OBJECTIVE:   :  AD:  [] Bilateral [] Single - longer distances [x] None - controlled environments, around the home  Brace for ambulation: [] No  [x] Yes - Range: unlocked;  Functional brace    Gait: WBAT without AD, slowed j luis and decreased step length with extensor lag    Mild effusion persisting    No notable skin changes    Sensitivity to light touch      PROM AROM     L R L R   Knee Flexion 122 heel slide      Knee Extension Lacking 2 resting  Lacking 1 w/ QS         RESTRICTIONS/PRECAUTIONS: ACL repair and menisectomy 6/4/2020     Exercises/Interventions:     Therapeutic Exercise (59043)  Resistance / level Sets/sec Reps Notes / Cues   Bike (seat position 4) ROM 8'     IB - Calf stretch  30\"  3    Long Sitting Hamstring stretch  30\" 3    Prone quad stretch  30\" 3    Heel slides   20\" 5    LAQ  1 10    sidelying hip abduction    bridge    Standing HR     Leg curls    Leg extension (90-30) (blocked at 3rd hole for extension)   Leg press (90-10) For improved quad control and strengthening   Modified wall sit    Stool scoots For improve hamstring strengthening          Therapeutic Activities (10450)        Mini squats Added 7/17 tactile cueing and quad facilitation   SL sit to stand Added 7/29 cueing throughout for control; 7/31 held due to pain levels, unable to complete   Goblet squats Cueing to maintain knee alignment and equal WB   Step up Tactile facilitation for quad activation   Step down Cue to allow for quad eccentric control by bending knee prior to R foot hitting floor   Lateral stepping with band    stairs Cueing for reciprocal pattern and control especially with descent   Anjali and Ana  10'                   Neuromuscular Re-ed (96538)        For quadriceps atrophy and re-education in order to improve quad function and eccentric control which will improve function and QOL. Quad Set  Tactile facilitation      SAQ Wanda from PT to attain full range extension for first set.    SAQ to SLR supine  Added 7/29 - Min quad lag present from SAQ, able to maintain and not lose any extension with addition of SLR    SLR - flexion (semi-reclined)  Manual assist for initiation, cueing throughout to decrease extensor lag   SLR hold Manual assist; 8/17 - held due to pain levels   Standing w/ slight knee flex B ER into PT resistance  For glute med activation to improve performance of sit<>stand transitions from table    TKE w/ DF    biodex balance  For improved proprioception and balance Manual Intervention (67691)       Manual OP into extension       mob  Gentle for pain modulation              AquaticTherapy Dates of Service:   Aquatic Visits Exercises/Activities:  Slow progression with exercises secondary to CRPS diagnosis post ACL Reconstruction. Assess response at each visit. Transfers:          % Immersion:            Ambulation:   UE Exercises:       Forwards x Shoulder Shrugs      Lateral  x  Shoulder Circles      Retro  x  Scapular Retraction       Marching x  Push Downs       Cariocas   Punching     Jog    Rowing     Multifidi walkouts with paddle   Elbow Flex/Ext       Shldr Flex/Ext       [employer] aBd/aDd    LE Exercises:  Shldr Horiz aBd/aDd    HR/TR x Shldr IR/ER    Marches x Arm Circles    Squats x PNF Diagonals    Hamstring Curls x Wall Push Ups    Hip Flexion (SLR) x Figure 8's    Hip aBduction (SLR) x Bilateral Pull Downs    Hip Extension (SLR) x      Hip aDduction (SLR)      Hip Circles x Functional:    Hip IR/Er  Step up forward x   TrA Set   Step up lateral  x   Pelvic Tilts   Step down     Fig 8's   Lunges Forward    LE PNF  Lunges Retro      Lunges Lateral     Balance:   Lower ab curl with noodle     SLS  x      Tandem Stance x      NBOS eyes open x Seated:     NBOS eyes closed x Ankle pumps     Hand to Opposite Knee x Ankle Circles     Fwd Step ups to SLS  Knee Flex/Ext x   Lateral Step ups to SLS  Hip aBd/aDd    Stop/Go Gait   Bicycle  x     Ankle DF/PF      Ankle Inv/Ev    Stretching:       Gastroc/Soleus x     Hamstring  x Noodle:     Knee Flex Stretch x Leg Press    Piriformis   Noodle Hang at [employer] [place]    Hip Flexor x Noodle Hang [place]    SKTC  Noodle Bicycle     DKTC       ITB      Quad x [place]:    Mid Back   Jog    UT  Jumping Jacks    Post Shoulder  Heel to Toe    Ladder Pull  Hand to Opposite Knee    Pec Stretch  Rocking Horse      [employer] Skier          Cervical:   Other:     AROM Flexion      AROM Extension      AROM Side Bending      AROM Rotation not limited to self-care, transfers, ambulation, and ascending / descending stairs. Modalities:  IFC to knee - 10' @ 13.5 amps    Charges:  Timed Code Treatment Minutes: 30   Total Treatment Minutes: 40     [] EVAL - LOW (48784)   [] EVAL - MOD (79525)  [] EVAL - HIGH (36086)  [] RE-EVAL (47497)  [x] LT(00334) x 1      [] Ionto  [] NMR (75029) x       [] Vaso  [] Manual (13815) x       [] Ultrasound  [x] TA x 1        [] Mech Traction (50972)  [] Aquatic Therapy x      [x] ES (un) (13470):   [] Home Management Training x  [] ES(attended) (38924)   [] Dry Needling 1-2 muscles (57285):  [] Dry Needling 3+ muscles (594620  [] Group:      [] Other:     GOALS:  Patient stated goal: improve ROM and return to normal   [x]? Progressing: []? Met: []? Not Met: []? Adjusted     Therapist goals for Patient:   Short Term Goals: To be achieved in: 2 weeks  1. Independent in HEP and progression per patient tolerance, in order to prevent re-injury. []? Progressing: [x]? Met: []? Not Met: []? Adjusted  2. Patient will have a decrease in pain to facilitate improvement in movement, function, and ADLs as indicated by Functional Deficits. []? Progressing: []? Met: [x]? Not Met: []? Adjusted     Long Term Goals: To be achieved in: 12 weeks  1. Disability index score of 20% or less for the LEFS to assist with reaching prior level of function. []? Progressing: []? Met: [x]? Not Met: []? Adjusted  2. Patient will demonstrate increased AROM to at least 0-120 to allow for proper joint functioning as indicated by patients Functional Deficits. [x]? Progressing: []? Met: []? Not Met: []? Adjusted  3. Patient will demonstrate an increase in Strength to at least 4+/5 quad as well as good proximal hip strength and control to allow for proper functional mobility as indicated by patients Functional Deficits. []? Progressing: []? Met: [x]? Not Met: []? Adjusted  4.  Patient will return to functional activities including walking w/o

## 2020-09-17 NOTE — TELEPHONE ENCOUNTER
I emailed a copy of patient's denial letter to [email or web address] with this message:      Patient had orders for a bone scan from Dr. Blas Tobias. Patient had this scan on 9/9/20 at [facility].  Patient received a denial letter (attached and scanned to media in [facility]). I called the billing department on 9/17/2020 and was told all they could see on their side was that this was still pending. Could someone please assist with this?

## 2020-09-17 NOTE — TELEPHONE ENCOUNTER
JDA- please advise and see messages below regarding pt's question.  Letter she is referring to is under Media as \"denial letter\"

## 2020-09-17 NOTE — TELEPHONE ENCOUNTER
Zak Hook, please advise:  See messages below regarding pt's concerns. GUTIERREZ wanted this routed to you for further care.

## 2020-09-23 ENCOUNTER — APPOINTMENT (OUTPATIENT)
Dept: PHYSICAL THERAPY | Age: 45
End: 2020-09-23
Payer: COMMERCIAL

## 2020-09-23 ENCOUNTER — HOSPITAL ENCOUNTER (OUTPATIENT)
Dept: MRI IMAGING | Age: 45
Discharge: HOME | End: 2020-09-23
Payer: COMMERCIAL

## 2020-09-23 PROCEDURE — 73721 MRI JNT OF LWR EXTRE W/O DYE: CPT

## 2020-09-25 ENCOUNTER — HOSPITAL ENCOUNTER (OUTPATIENT)
Dept: PHYSICAL THERAPY | Age: 45
Setting detail: THERAPIES SERIES
Discharge: HOME | End: 2020-09-25
Payer: COMMERCIAL

## 2020-09-25 ENCOUNTER — TELEPHONE (OUTPATIENT)
Dept: ORTHOPEDIC SURGERY | Age: 45
End: 2020-09-25

## 2020-09-25 PROCEDURE — 97113 AQUATIC THERAPY/EXERCISES: CPT

## 2020-09-25 NOTE — FLOWSHEET NOTE
Gilda Pearson  Phone: [phone]   Fax: [phone]          Physical Therapy Treatment Note/ Progress Report:     Date:  2020    Patient Name:  Brian Boyle    :  1975  MRN: [id number]  Restrictions/Precautions:    Medical/Treatment Diagnosis Information:  Diagnosis: S83.512 (ICD-10-CM) - Anterior cruciate ligament complete tear, left  Treatment Diagnosis: L knee pain; L knee stiffness; L LE atrophy; L LE swelling; difficulty walking  Insurance/Certification information:  PT Insurance Information: Med [place] - visits per medical necessity; no auth  Physician Information:  Referring Practitioner: Dr. Anita Boucher of care signed (Y/N): [x]  Yes []  No     Date of Patient follow up with Physician: SAMANTHA     Progress Report: [x]  Yes  []  No     Date Range for reporting period:  Beginnin2020  Endin20    Progress report due (10 Rx/or 30 days whichever is less): visit #29 or      Recertification due (POC duration/ or 90 days whichever is less): visit #30 or 10/28/2020 (6 weeks)    Visit # Insurance Allowable Auth required? Date Range   - Medical necessity   &    Not Covered []  Yes  [x]  No n/a     Latex Allergy:  [x]NO      []YES  Preferred Language for Healthcare:   [x]English       []other:    Functional Scale:        Date assessed:  LEFS: raw score = 25/80; dysfunction = 69%  2020    Pain level:  3/10    SUBJECTIVE: Pt. Reports her pain continues to be higher, but it is mostly in the morning and at night. States it was [place] little better\" after the last session, but pain came back on the next day. States     OBJECTIVE:   :  AD:  [] Bilateral [] Single - longer distances [x] None - controlled environments, around the home  Brace for ambulation: [] No  [x] Yes - Range: unlocked;  Functional brace    Gait: WBAT without AD, slowed j luis and decreased step length with extensor lag    Mild effusion persisting    No notable skin changes    Sensitivity to light touch      PROM AROM     L R L R   Knee Flexion 122 heel slide      Knee Extension Lacking 2 resting  Lacking 1 w/ QS         RESTRICTIONS/PRECAUTIONS: ACL repair and menisectomy 6/4/2020     Exercises/Interventions:     Therapeutic Exercise (25257)  Resistance / level Sets/sec Reps Notes / Cues   Bike (seat position 4) ROM 8'     IB - Calf stretch  30\"  3    Long Sitting Hamstring stretch  30\" 3    Prone quad stretch  30\" 3    Heel slides   20\" 5    LAQ  1 10    sidelying hip abduction    bridge    Standing HR     Leg curls    Leg extension (90-30) (blocked at 3rd hole for extension)   Leg press (90-10) For improved quad control and strengthening   Modified wall sit    Stool scoots For improve hamstring strengthening          Therapeutic Activities (30147)        Mini squats Added 7/17 tactile cueing and quad facilitation   SL sit to stand Added 7/29 cueing throughout for control; 7/31 held due to pain levels, unable to complete   Goblet squats Cueing to maintain knee alignment and equal WB   Step up Tactile facilitation for quad activation   Step down Cue to allow for quad eccentric control by bending knee prior to R foot hitting floor   Lateral stepping with band    stairs Cueing for reciprocal pattern and control especially with descent   Anjali and Viewing  10'                   Neuromuscular Re-ed (26370)        For quadriceps atrophy and re-education in order to improve quad function and eccentric control which will improve function and QOL. Quad Set  Tactile facilitation      SAQ Wanda from PT to attain full range extension for first set.    SAQ to SLR supine  Added 7/29 - Min quad lag present from SAQ, able to maintain and not lose any extension with addition of SLR    SLR - flexion (semi-reclined)  Manual assist for initiation, cueing throughout to decrease extensor lag   SLR hold Manual assist; 8/17 - held due to pain levels Standing w/ slight knee flex B ER into PT resistance  For glute med activation to improve performance of sit<>stand transitions from table    TKE w/ DF    biodex balance  For improved proprioception and balance                 Manual Intervention (62768)       Manual OP into extension       mob  Gentle for pain modulation              AquaticTherapy Dates of Service: 9/25  Aquatic Visits Exercises/Activities:  Slow progression with exercises secondary to CRPS diagnosis post ACL Reconstruction. Assess response at each visit. Transfers:          % Immersion:            Ambulation:   UE Exercises:       Forwards x2 with N Shoulder Shrugs      Lateral  x 1 with N Shoulder Circles      Retro  x  Scapular Retraction       Marching x  Push Downs       Cari[place]as   Punching     Jog    Rowing X10 B     Multifidi walkouts with paddle   Elbow Flex/Ext       Shldr Flex/Ext       Shldr aBd/aDd X10 B    LE Exercises:  Shldr Horiz aBd/aDd    HR/TR x10 Shldr IR/ER    Marches xNPV Arm Circles    Squats xNPV PNF Diagonals    Hamstring Curls x10 Wall Push Ups    Hip Flexion (SLR) x10 Figure 8's    Hip aBduction (SLR) x10 Bilateral Pull Downs    Hip Extension (SLR) x10      Hip aDduction (SLR)      Hip Circles x Functional:    Hip IR/Er  Step up forward x   TrA Set   Step up lateral  x   Pelvic Tilts   Step down     Fig 8's   Lunges Forward    LE PNF  Lunges Retro      Lunges Lateral     Balance:   Lower ab curl with noodle     SLS  x      Tandem Stance x      NBOS eyes open x Seated:     NBOS eyes closed x Ankle pumps     Hand to Opposite Knee x Ankle Circles     Fwd Step ups to SLS  Knee Flex/Ext    Lateral Step ups to SLS  Hip aBd/aDd    Stop/Go Gait   Bicycle     Narrow AYSHA 20\" X2 Ankle DF/PF      Ankle Inv/Ev    Stretching:       Gastroc/Soleus X30\" X2     Hamstring  X30\" X2 long sit bench Noodle:     Knee Flex Stretch X5\" X10 Leg Press    Piriformis  PT assisted on L 15\" X4; 15\" X3 R Noodle Hang at Wall    Hip Flexor x Noodle hip, and core control with self care, mobility, lifting, ambulation. [x] (22552) Provided verbal/tactile cueing for activities related to improving balance, coordination, kinesthetic sense, posture, motor skill, proprioception  to assist with LE, proximal hip, and core control in self care, mobility, lifting, ambulation and eccentric single leg control.   [] (20105) Therapist is in constant attendance of 2 or more patients providing skilled therapy interventions, but not providing any significant amount of measurable one-on-one time to either patient, for improvements in LE, proximal hip, and core control in self care, mobility, lifting, ambulation and eccentric single leg control.      NMR and Therapeutic Activities:    [x] (65224 or 07934) Provided verbal/tactile cueing for activities related to improving balance, coordination, kinesthetic sense, posture, motor skill, proprioception and motor activation to allow for proper function of core, proximal hip and LE with self care and ADLs  [] (00024) Gait Re-education- Provided training and instruction to the patient for proper LE, core and proximal hip recruitment and positioning and eccentric body weight control with ambulation re-education including up and down stairs     Home Exercise Program:    [x] (34088) Reviewed/Progressed HEP activities related to strengthening, flexibility, endurance, ROM of core, proximal hip and LE for functional self-care, mobility, lifting and ambulation/stair navigation   [] (38508)Reviewed/Progressed HEP activities related to improving balance, coordination, kinesthetic sense, posture, motor skill, proprioception of core, proximal hip and LE for self care, mobility, lifting, and ambulation/stair navigation      Manual Treatments:  PROM / STM / Oscillations-Mobs:  G-I, II, III, IV (PA's, Inf., Post.)  [x] (80833) Provided manual therapy to mobilize LE, proximal hip and/or LS spine soft tissue/joints for the purpose of modulating pain, promoting relaxation,  increasing ROM, reducing/eliminating soft tissue swelling/inflammation/restriction, improving soft tissue extensibility and allowing for proper ROM for normal function with self care, mobility, lifting and ambulation. Modalities:  [] (71557) Vasopneumatic compression: Utilized vasopneumatic compression to decrease edema / swelling for the purpose of improving mobility and quad tone / recruitment which will allow for increased overall function including but not limited to self-care, transfers, ambulation, and ascending / descending stairs. Modalities:  IFC to knee - 10' @ 13.5 amps    Charges:  Timed Code Treatment Minutes: 38   Total Treatment Minutes: 38     [] EVAL - LOW (24303)   [] EVAL - MOD (17633)  [] EVAL - HIGH (77052)  [] RE-EVAL (76218)  [] YE(22217) x 1      [] Ionto  [] NMR (12632) x       [] Vaso  [] Manual (14081) x       [] Ultrasound  [] TA x 1        [] Mech Traction (29674)  [x] Aquatic Therapy x 3     [x] ES (un) (80248):   [] Home Management Training x  [] ES(attended) (84339)   [] Dry Needling 1-2 muscles (24389):  [] Dry Needling 3+ muscles (913026  [] Group:      [] Other:     GOALS:  Patient stated goal: improve ROM and return to normal   [x]? Progressing: []? Met: []? Not Met: []? Adjusted     Therapist goals for Patient:   Short Term Goals: To be achieved in: 2 weeks  1. Independent in HEP and progression per patient tolerance, in order to prevent re-injury. []? Progressing: [x]? Met: []? Not Met: []? Adjusted  2. Patient will have a decrease in pain to facilitate improvement in movement, function, and ADLs as indicated by Functional Deficits. []? Progressing: []? Met: [x]? Not Met: []? Adjusted     Long Term Goals: To be achieved in: 12 weeks  1. Disability index score of 20% or less for the LEFS to assist with reaching prior level of function. []? Progressing: []? Met: [x]? Not Met: []? Adjusted  2.  Patient will demonstrate increased AROM to at least 0-120 to allow for proper joint functioning as indicated by patients Functional Deficits. [x]? Progressing: []? Met: []? Not Met: []? Adjusted  3. Patient will demonstrate an increase in Strength to at least 4+/5 quad as well as good proximal hip strength and control to allow for proper functional mobility as indicated by patients Functional Deficits. []? Progressing: []? Met: [x]? Not Met: []? Adjusted  4. Patient will return to functional activities including walking w/o assistive device without increased symptoms or restriction. [x]? Progressing: []? Met: []? Not Met: []? Adjusted  5. Patient will be able to return to work full duty without restriction. [x]? Progressing: []? Met: []? Not Met: []? Adjusted         Overall Progression Towards Functional goals/ Treatment Progress Update:  [] Patient is progressing as expected towards functional goals listed. [x] Progression is slowed due to complexities/Impairments listed. [] Progression has been slowed due to co-morbidities. [] Plan just implemented, too soon to assess goals progression <30days   [] Goals require adjustment due to lack of progress  [] Patient is not progressing as expected and requires additional follow up with physician  [] Other    Persisting Functional Limitations/Impairments:  []Sitting [x]Standing   [x]Walking [x]Stairs   []Transfers [x]ADLs   [x]Squatting/bending [x]Kneeling  [x]Housework [x]Job related tasks  []Driving [x]Sports/Recreation   [x]Sleeping []Other:    ASSESSMENT: See POC Above. Treatment/Activity Tolerance:  [] Pt able to complete treatment [] Patient limited by fatique  [x] Patient limited by pain  [x] Patient limited by other medical complications  [] Other:     Prognosis:  [x] Good [] Fair  [] Poor    Patient Requires Follow-up: [x] Yes  [] No    Return to Play:    [x]  N/A    PLAN: See court 1-2x/wk for 4-6 weeks. Initiate aquatic therapy.      [x] Continue per plan of care [] Alter current plan (see comments)  [] Plan of care initiated [] Hold pending MD visit [] Discharge    Electronically signed by: Swati Ortiz PT, MSPT      Note: If patient does not return for scheduled/ recommended follow up visits, this note will serve as a discharge from care along with most recent update on progress.

## 2020-09-25 NOTE — TELEPHONE ENCOUNTER
No new findings on the MRI and everything appears to be healing well. They do mention her medial meniscus tear which was not on the index MRI but was noted at time of surgery and repaired. Certainly, at this point I would not expect it to be completely healed and even if the tear was causing some residual symptoms would be nowhere near the level of pain that she is experiencing. I would recommend a second opinion with Dr. Darrel Doty who has over 40 years of clinical experience with ACL reconstructions.

## 2020-09-25 NOTE — TELEPHONE ENCOUNTER
PATIENT IS CALLING FOR MRI TEST RESULTS.  SHE WANTS TO KNOW IF DR VEGA WILL GIVE THEM TO HER OVER THE PHONE?  [phone]

## 2020-09-28 ENCOUNTER — TELEPHONE (OUTPATIENT)
Dept: ORTHOPEDIC SURGERY | Age: 45
End: 2020-09-28

## 2020-09-28 NOTE — TELEPHONE ENCOUNTER
DR. Roberto Resendiz REFERRED PATIENT TO DR. Earl De La Garza., NEXT AVAILABLE IS 10/13/20. PATIENT DID NOT WANT NEXT AVAILABLE, WOULD LIKE A CALLBACK [phone].

## 2020-09-29 ENCOUNTER — HOSPITAL ENCOUNTER (OUTPATIENT)
Dept: PHYSICAL THERAPY | Age: 45
Setting detail: THERAPIES SERIES
Discharge: HOME | End: 2020-09-29
Payer: COMMERCIAL

## 2020-09-29 ENCOUNTER — OFFICE VISIT (OUTPATIENT)
Dept: ORTHOPEDIC SURGERY | Age: 45
End: 2020-09-29
Payer: COMMERCIAL

## 2020-09-29 VITALS — WEIGHT: 139.99 LBS | TEMPERATURE: 98.1 F | HEIGHT: 63 IN | BODY MASS INDEX: 24.8 KG/M2

## 2020-09-29 PROCEDURE — 99204 OFFICE O/P NEW MOD 45 MIN: CPT | Performed by: STUDENT IN AN ORGANIZED HEALTH CARE EDUCATION/TRAINING PROGRAM

## 2020-09-29 PROCEDURE — 97150 GROUP THERAPEUTIC PROCEDURES: CPT

## 2020-09-29 PROCEDURE — 97113 AQUATIC THERAPY/EXERCISES: CPT

## 2020-09-29 RX ORDER — METHYLPREDNISOLONE 4 MG/1
TABLET ORAL
Qty: 1 KIT | Refills: 1 | Status: SHIPPED | OUTPATIENT
Start: 2020-09-29 | End: 2020-11-05 | Stop reason: ALTCHOICE

## 2020-09-29 RX ORDER — GABAPENTIN 300 MG/1
300 CAPSULE ORAL 2 TIMES DAILY
Qty: 180 CAPSULE | Refills: 1 | Status: SHIPPED | OUTPATIENT
Start: 2020-09-29 | End: 2021-04-08 | Stop reason: SDUPTHER

## 2020-09-29 NOTE — FLOWSHEET NOTE
[facility]  Phone: [phone]   Fax: [phone]          Physical Therapy Treatment Note/ Progress Report:     Date:  2020    Patient Name:  Daren Perea    :  1975  MRN: [id number]  Restrictions/Precautions:    Medical/Treatment Diagnosis Information:  Diagnosis: S83.512 (ICD-10-CM) - Anterior cruciate ligament complete tear, left  Treatment Diagnosis: L knee pain; L knee stiffness; L LE atrophy; L LE swelling; difficulty walking  Insurance/Certification information:  PT Insurance Information: [employer] [place] - visits per medical necessity; no auth  Physician Information:  Referring Practitioner: Dr. Sherrill Cohen of care signed (Y/N): [x]  Yes []  No     Date of Patient follow up with Physician: SAMANTHA     Progress Report: [x]  Yes  []  No     Date Range for reporting period:  Beginnin2020  Endin20    Progress report due (10 Rx/or 30 days whichever is less): visit #29 or      Recertification due (POC duration/ or 90 days whichever is less): visit #30 or 10/28/2020 (6 weeks)    Visit # Insurance Allowable Auth required? Date Range   + 28- Medical necessity   &    Not Covered []  Yes  [x]  No n/a     Latex Allergy:  [x]NO      []YES  Preferred Language for Healthcare:   [x]English       []other:    Functional Scale:        Date assessed:  LEFS: raw score = 25/80; dysfunction = 69%  2020    Pain level:  /10    SUBJECTIVE:    L knee pain persists, had apt for 2nd opinion, dx w/CRPS type II of LLE and provided with new script for PT eval for TENS to quad, patellar mobs, gentle L knee AROM and to continue aquatic PT. Pt given prescription for medrol dose pack as well. OBJECTIVE:   :  AD:  [] Bilateral [] Single - longer distances [x] None - controlled environments, around the home  Brace for ambulation: [] No  [x] Yes - Range: unlocked;  Functional brace    Gait: WBAT without AD, slowed j luis and decreased step length with extensor lag    Mild effusion persisting    No notable skin changes    Sensitivity to light touch      PROM AROM     L R L R   Knee Flexion 122 heel slide      Knee Extension Lacking 2 resting  Lacking 1 w/ QS         RESTRICTIONS/PRECAUTIONS: ACL repair and menisectomy 6/4/2020     Exercises/Interventions:     Therapeutic Exercise (64841)  Resistance / level Sets/sec Reps Notes / Cues   Bike (seat position 4) ROM 8'     IB - Calf stretch  30\"  3    Long Sitting Hamstring stretch  30\" 3    Prone quad stretch  30\" 3    Heel slides   20\" 5    LAQ  1 10    sidelying hip abduction    bridge    Standing HR     Leg curls    Leg extension (90-30) (blocked at 3rd hole for extension)   Leg press (90-10) For improved quad control and strengthening   Modified wall sit    Stool scoots For improve hamstring strengthening          Therapeutic Activities (18911)        Mini squats Added 7/17 tactile cueing and quad facilitation   SL sit to stand Added 7/29 cueing throughout for control; 7/31 held due to pain levels, unable to complete   Goblet squats Cueing to maintain knee alignment and equal WB   Step up Tactile facilitation for quad activation   Step down Cue to allow for quad eccentric control by bending knee prior to R foot hitting floor   Lateral stepping with band    stairs Cueing for reciprocal pattern and control especially with descent   Anjali and Viewing  10'                   Neuromuscular Re-ed (30890)        For quadriceps atrophy and re-education in order to improve quad function and eccentric control which will improve function and QOL. Quad Set  Tactile facilitation      SAQ Wanda from PT to attain full range extension for first set.    SAQ to SLR supine  Added 7/29 - Min quad lag present from SAQ, able to maintain and not lose any extension with addition of SLR    SLR - flexion (semi-reclined)  Manual assist for initiation, cueing throughout to decrease extensor lag Press    Piriformis   Noodle Hang at Darl Jerrell    Hip Flexor x Noodle Hang Deep Water    SKTC  Noodle Bicycle     DKTC       L QL stretch in standing with L arm reach overhead to R     ITB/TFL standing L foot cross over with L lateral pelvic shift     Quad x Deep Water:    Mid Back   Jog    UT  Jumping Jacks    Post Shoulder  Heel to Toe    Ladder Pull  Hand to Opposite Knee    Pec Stretch  Rocking Horse      [employer] Skier          Cervical:   Other:     AROM Flexion      AROM Extension      AROM Side Bending      AROM Rotation      Chin Tucks      Chin Nods        Aquatic Abbreviation Key  B= Belt DB= Dumbells T= Theratube   H= Hydrotone N= Noodles W= Weights   P= Paddles S= Speedo equipment K= Kickboard     Pt. Education:  -all pt questions were answered    Home Exercise Program:      9/29:  Carlos Manuel Larios pt on self patellar mobs & CFM to patellar tendon at end of session, pt reported increased sensitivity but feels she will be able to perform at home.       Access Code: [email or web address]    Date: 07/15/2020   Prepared by: Sarah Carr   Exercises   Standing Knee Flexion - 10 reps - 2 sets - 2x daily - 7x weekly   Sidelying Hip Abduction - 10 reps - 2 sets - 2x daily - 7x weekly   Prone Hip Extension - 10 reps - 2 sets - 2x daily - 7x weekly   Seated Long Arc Quad - 10 reps - 2 sets - 2x daily - 7x weekly     Access Code: Werner Martinez   Date: 07/09/2020   Prepared by: Ron Jennings   Exercises   Supine Ankle Pumps - 10 reps - 3 sets - 3-5x daily - 7x weekly   Long Sitting Calf Stretch with Strap - 10 reps - 3 sets - 3-5x daily - 7x weekly   Seated Table Hamstring Stretch - 10 reps - 3 sets - 3-5x daily - 7x weekly   Supine Quad Set - 10 reps - 3 sets - 3-5x daily - 7x weekly   Supine Heel Slide with Strap - 10 reps - 3 sets - 3-5x daily - 7x weekly   Seated Knee Flexion AAROM - 10 reps - 3 sets - 3-5x daily - 7x weekly   Supine Knee Flexion AAROM at Wall - 10 reps - 3 sets - 3-5x daily - 7x weekly   Supine Knee Flexion AAROM at Wall - 10 reps - 3 sets - 3-5x daily - 7x weekly     Therapeutic Exercise and NMR EXR  [x] (40103) Provided verbal/tactile cueing for activities related to strengthening, flexibility, endurance, ROM for improvements in LE, proximal hip, and core control with self care, mobility, lifting, ambulation. [x] (61820) Provided verbal/tactile cueing for activities related to improving balance, coordination, kinesthetic sense, posture, motor skill, proprioception  to assist with LE, proximal hip, and core control in self care, mobility, lifting, ambulation and eccentric single leg control.   [] (51184) Therapist is in constant attendance of 2 or more patients providing skilled therapy interventions, but not providing any significant amount of measurable one-on-one time to either patient, for improvements in LE, proximal hip, and core control in self care, mobility, lifting, ambulation and eccentric single leg control.      NMR and Therapeutic Activities:    [x] (71706 or 89439) Provided verbal/tactile cueing for activities related to improving balance, coordination, kinesthetic sense, posture, motor skill, proprioception and motor activation to allow for proper function of core, proximal hip and LE with self care and ADLs  [] (60225) Gait Re-education- Provided training and instruction to the patient for proper LE, core and proximal hip recruitment and positioning and eccentric body weight control with ambulation re-education including up and down stairs     Home Exercise Program:    [x] (50416) Reviewed/Progressed HEP activities related to strengthening, flexibility, endurance, ROM of core, proximal hip and LE for functional self-care, mobility, lifting and ambulation/stair navigation   [] (39835)Reviewed/Progressed HEP activities related to improving balance, coordination, kinesthetic sense, posture, motor skill, proprioception of core, proximal hip and LE for self care, mobility, lifting, and ambulation/stair navigation Manual Treatments:  PROM / STM / Oscillations-Mobs:  G-I, II, III, IV (PA's, Inf., Post.)  [x] (21191) Provided manual therapy to mobilize LE, proximal hip and/or LS spine soft tissue/joints for the purpose of modulating pain, promoting relaxation,  increasing ROM, reducing/eliminating soft tissue swelling/inflammation/restriction, improving soft tissue extensibility and allowing for proper ROM for normal function with self care, mobility, lifting and ambulation. Modalities:  [] (91994) Vasopneumatic compression: Utilized vasopneumatic compression to decrease edema / swelling for the purpose of improving mobility and quad tone / recruitment which will allow for increased overall function including but not limited to self-care, transfers, ambulation, and ascending / descending stairs. Modalities:  IFC to knee - 10' @ 13.5 amps    Charges:  Timed Code Treatment Minutes: 22   Total Treatment Minutes: 53     [] EVAL - LOW (48739)   [] EVAL - MOD (48079)  [] EVAL - HIGH (49553)  [] RE-EVAL (14197)  [] PA(49726) x 1      [] Ionto  [] NMR (84799) x       [] Vaso  [] Manual (63837) x       [] Ultrasound  [] TA x 1        [] Mech Traction (69359)  [x] Aquatic Therapy x 1     [x] ES (un) (97293):   [] Home Management Training x  [] ES(attended) (52515)   [] Dry Needling 1-2 muscles (09939):  [] Dry Needling 3+ muscles (148307  [x] Group: 1     [] Other:     GOALS:  Patient stated goal: improve ROM and return to normal   [x]? Progressing: []? Met: []? Not Met: []? Adjusted     Therapist goals for Patient:   Short Term Goals: To be achieved in: 2 weeks  1. Independent in HEP and progression per patient tolerance, in order to prevent re-injury. []? Progressing: [x]? Met: []? Not Met: []? Adjusted  2. Patient will have a decrease in pain to facilitate improvement in movement, function, and ADLs as indicated by Functional Deficits. []? Progressing: []? Met: [x]? Not Met: []? Adjusted     Long Term Goals:  To be achieved in: 12 weeks  1. Disability index score of 20% or less for the LEFS to assist with reaching prior level of function. []? Progressing: []? Met: [x]? Not Met: []? Adjusted  2. Patient will demonstrate increased AROM to at least 0-120 to allow for proper joint functioning as indicated by patients Functional Deficits. [x]? Progressing: []? Met: []? Not Met: []? Adjusted  3. Patient will demonstrate an increase in Strength to at least 4+/5 quad as well as good proximal hip strength and control to allow for proper functional mobility as indicated by patients Functional Deficits. []? Progressing: []? Met: [x]? Not Met: []? Adjusted  4. Patient will return to functional activities including walking w/o assistive device without increased symptoms or restriction. [x]? Progressing: []? Met: []? Not Met: []? Adjusted  5. Patient will be able to return to work full duty without restriction. [x]? Progressing: []? Met: []? Not Met: []? Adjusted         Overall Progression Towards Functional goals/ Treatment Progress Update:  [] Patient is progressing as expected towards functional goals listed. [x] Progression is slowed due to complexities/Impairments listed. [] Progression has been slowed due to co-morbidities. [] Plan just implemented, too soon to assess goals progression <30days   [] Goals require adjustment due to lack of progress  [] Patient is not progressing as expected and requires additional follow up with physician  [] Other    Persisting Functional Limitations/Impairments:  []Sitting [x]Standing   [x]Walking [x]Stairs   []Transfers [x]ADLs   [x]Squatting/bending [x]Kneeling  [x]Housework [x]Job related tasks  []Driving [x]Sports/Recreation   [x]Sleeping []Other:    ASSESSMENT:     Patient demo'ing improved gait pattern on land this date with good WB'ing through LLE. Pt apprehensive to patellar mobs but feels she'll be able to perform them.       Treatment/Activity Tolerance:  [] Pt able to complete treatment [] Patient limited by fatique  [x] Patient limited by pain  [x] Patient limited by other medical complications  [] Other:     Prognosis:  [x] Good [] Fair  [] Poor    Patient Requires Follow-up: [x] Yes  [] No    Return to Play:    [x]  N/A    PLAN: See eval. 1-2x/wk for 4-6 weeks. Initiate aquatic therapy. [x] Continue per plan of care [] Alter current plan (see comments)  [] Plan of care initiated [] Hold pending MD visit [] Discharge    Electronically signed by: Taniya Nash PT    Note: If patient does not return for scheduled/ recommended follow up visits, this note will serve as a discharge from care along with most recent update on progress.

## 2020-09-29 NOTE — PROGRESS NOTES
[facility]  History and Physical  Knee Pain    Date:  2020    Name:  Danyel Mc  Address:  [address] [place] [address]. [address]    :  1975      Age:   39 y.o.    SSN:  [id number]      Medical Record Number:  [id number]      Chief Complaint    New Patient (left knee, sx. on 20, Dr. Michael Cason )      History of Present Illness:  Danyel Mc is a 39 y.o. female who presents for new patient evaluation regarding her left knee. The patient is here for second opinion. The patient is a nurse from [employer]. Back in April she was playing with her kids when she injured her left knee and felt a pop. She had a reinjury in May and an MRI was done which demonstrated a medial meniscus tear and an ACL tear. She underwent a several weeks of physical therapy before undergoing a left knee ACL reconstruction with bone patella bone autograft and medial meniscus all inside repair with Dr. Michael Cason on 2020. The patient was doing okay with her initial therapy, but about a month ago she started noting worsening pain and swelling to the knee. The pain continued to increase to about an 8 or 9 out of 10 in intensity. She describes a constant aching pain that does not go away. She does occasionally have a sharp pain. The pain does not have a specific area and is all over the knee. She reports that she is tried ice, but the ice actually is very painful for her and cannot tolerate it. Due to these findings the patient slowed her physical therapy. An infection work-up was performed by Dr. Michael Cason in early 2020 and was negative. The patient was started on 300 mg of Neurontin which he takes at night. She has tried it twice a day but it made her too lethargic. She was also started on Fosamax. She has tried Voltaren gel without much effect. She is taking ibuprofen 800 mg twice daily.   She is also working physical therapy at [facility] where they are doing aquatic therapy. Pain Assessment  Location of Pain: Knee  Location Modifiers: Left  Severity of Pain: 4  Quality of Pain: Sharp, Aching  Duration of Pain: Persistent  Frequency of Pain: Constant  Aggravating Factors: Bending, Walking  Relieving Factors:  Other (Comment)(n/a)  Result of Injury: No  Work-Related Injury: No  Are there other pain locations you wish to document?: No    Past Medical History:   Diagnosis Date    Anesthesia     HR has dropped after surgeries in the past    Anxiety     Depression     Disease of blood and blood forming organ     Liden factor V    Factor 5 Leiden mutation, heterozygous ([address]     Hashimoto's disease     no meds    Postpartum depression     no meds    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 COLONOSCOPY N/A 11/26/2019    COLONOSCOPY POLYPECTOMY SNARE/COLD BIOPSY performed by Chitra Resendiz MD at [address] [facility]      TONSILLECTOMY      UPPER GASTROINTESTINAL ENDOSCOPY N/A 11/26/2019    EGD BIOPSY performed by Chitra Resendiz MD at [address]       Family History   Problem Relation Age of Onset    High Blood Pressure Father     Cancer Father     Diabetes Brother        Social History     Socioeconomic History    Marital status: Single     Spouse name: None    Number of children: None    Years of education: None    Highest education level: None   Occupational History    None   Social Needs    Financial resource strain: None    Food insecurity Worry: None     Inability: None    Transportation needs     Medical: None     Non-medical: None   Tobacco Use    Smoking status: Never Smoker    Smokeless tobacco: Never Used   Substance and Sexual Activity    Alcohol use: Yes     Comment: socially    Drug use: No    Sexual activity: Yes     Partners: Male   Lifestyle    Physical activity     Days per week: None     Minutes per session: None    Stress: None   Relationships    Social connections     Talks on phone: None     Gets together: None     Attends Muslim service: None     Active member of club or organization: None     Attends meetings of clubs or organizations: None     Relationship status: None    Intimate partner violence     Fear of current or ex partner: None     Emotionally abused: None     Physically abused: None     Forced sexual activity: None   Other Topics Concern    None   Social History Narrative    None       Current Outpatient Medications   Medication Sig Dispense Refill    methylPREDNISolone (MEDROL, ESTELITA,) 4 MG tablet Take by mouth. 1 kit 1    gabapentin (NEURONTIN) 300 MG capsule Take 1 capsule by mouth 2 times daily for 180 days. Intended supply: 90 days 180 capsule 1    alendronate (FOSAMAX) 70 MG tablet Take 1 tablet by mouth every 7 days Take once per week in the morning with a full glass of water, on an empty stomach, and do not take anything else by mouth or lie down for the next 30 minutes. 4 tablet 3    gabapentin (NEURONTIN) 300 MG capsule Take 1 capsule by mouth 2 times daily for 30 days. Intended supply: 90 days 60 capsule 2    omeprazole (PRILOSEC) 20 MG delayed release capsule Take 20 mg by mouth Daily       No current facility-administered medications for this visit.         Allergies   Allergen Reactions    Sulfa Antibiotics Rash         Review of Systems:  A 14 point review of systems was completed by the patient and is available in the media section of the scanned medical record and was reviewed on including active    early phase inflammation.  The activity is greatest at sites of surgery    within the left lateral femoral condyle and proximal tibia, as well as within    the anterior left patella.  Recent postoperative change can produce this    appearance given patient history of such.  Infection cannot be excluded in    the appropriate clinical setting.         Mild degenerative change of the right knee. Office Procedures:  Orders Placed This Encounter   Procedures    XR KNEE LEFT (1-2 VIEWS)     Standing Status:   Future     Number of Occurrences:   1     Standing Expiration Date:   9/29/2021    XR KNEE RIGHT (1-2 VIEWS)     Standing Status:   Future     Number of Occurrences:   1     Standing Expiration Date:   9/29/2021   [facility] [place]     Referral Priority:   Routine     Referral Type:   Eval and Treat     Referral Reason:   Specialty Services Required     Requested Specialty:   Physical Therapy     Number of Visits Requested:   1            Assessment: Patient is a 39 y.o. female 4 months status post left knee ACL reconstruction with BTB autograft by outside surgeon. -CRPS type II  -Left knee patella Baja    Visit Diagnoses       Codes    Complex regional pain syndrome type 2 of left lower extremity    -  Primary G57.72    Pain in both knees, unspecified chronicity     M25.561, M25.562          Orders Placed This Encounter   Medications    methylPREDNISolone (MEDROL, ESTELITA,) 4 MG tablet     Sig: Take by mouth. Dispense:  1 kit     Refill:  1    gabapentin (NEURONTIN) 300 MG capsule     Sig: Take 1 capsule by mouth 2 times daily for 180 days. Intended supply: 90 days     Dispense:  180 capsule     Refill:  1       Medical decision making:  Patient's workup and evaluation were reviewed with the patient in the office today. The patient's x-rays demonstrate some slight inferior movement of the patella signifying postoperative patella Baja.   For this the patient errors. It is possible that there are still dictated errors within this office note. If so, please bring any errors to my attention for an addendum. All efforts were made to ensure that this office note is accurate.

## 2020-09-30 ENCOUNTER — APPOINTMENT (OUTPATIENT)
Dept: PHYSICAL THERAPY | Age: 45
End: 2020-09-30
Payer: COMMERCIAL

## 2020-09-30 ENCOUNTER — TELEPHONE (OUTPATIENT)
Dept: ORTHOPEDIC SURGERY | Age: 45
End: 2020-09-30

## 2020-09-30 NOTE — TELEPHONE ENCOUNTER
FAXED EMILY / Yvrose Mejia ( VEGA ) 09/01/2020 TO PRESENT TO Mary Ellen Howe @ [address] [place] @ [phone]

## 2020-10-01 ENCOUNTER — TELEPHONE (OUTPATIENT)
Dept: ORTHOPEDIC SURGERY | Age: 45
End: 2020-10-01

## 2020-10-01 NOTE — TELEPHONE ENCOUNTER
SPoke to patient and gave message regarding RTW date an next appt, and also that [facility] paperwork was received and routed to [facility] for completion.

## 2020-10-01 NOTE — TELEPHONE ENCOUNTER
Patient was seen at Dr Alberto Rhoades' office an they are trying to get her in somewhere for a block in the next week or 2. Her current RTW date is 10/5. She is asking if you will extend that to give her time to have the block and see how she responds. She would also like to know when you would like her to follow up with you.

## 2020-10-02 ENCOUNTER — TELEPHONE (OUTPATIENT)
Dept: ORTHOPEDIC SURGERY | Age: 45
End: 2020-10-02

## 2020-10-06 ENCOUNTER — HOSPITAL ENCOUNTER (OUTPATIENT)
Dept: PHYSICAL THERAPY | Age: 45
Setting detail: THERAPIES SERIES
Discharge: HOME | End: 2020-10-06
Payer: COMMERCIAL

## 2020-10-06 NOTE — PROGRESS NOTES
Physical Therapy  Cancellation/No-show Note  Patient Name:  Olivia Magaña  :  1975   Date:  10/6/2020  Cancelled visits to date: 2  No-shows to date: 0    Patient status for today's appointment patient:  [x]  Cancelled , 10/6  []  Rescheduled appointment  []  No-show     Reason given by patient:  []  Patient ill  []  Conflicting appointment  []  No transportation    []  Conflict with work  []  No reason given  [x]  Other:  Childcare difficulties   Comments:      Phone call information:   []  Phone call made today to patient at _ time at number provided:      []  Patient answered, conversation as follows:    []  Patient did not answer, message left as follows:  []  Phone call not made today  [x]  Phone call not made today - patient called in and provided reason for cancellation    Electronically signed by:  Savanah Joseph, PT, DPT

## 2020-10-07 ENCOUNTER — APPOINTMENT (OUTPATIENT)
Dept: PHYSICAL THERAPY | Age: 45
End: 2020-10-07
Payer: COMMERCIAL

## 2020-10-08 ENCOUNTER — TELEPHONE (OUTPATIENT)
Dept: ORTHOPEDIC SURGERY | Age: 45
End: 2020-10-08

## 2020-10-13 ENCOUNTER — HOSPITAL ENCOUNTER (OUTPATIENT)
Dept: PHYSICAL THERAPY | Age: 45
Setting detail: THERAPIES SERIES
Discharge: HOME | End: 2020-10-13
Payer: COMMERCIAL

## 2020-10-13 PROCEDURE — 97150 GROUP THERAPEUTIC PROCEDURES: CPT

## 2020-10-13 PROCEDURE — 97113 AQUATIC THERAPY/EXERCISES: CPT

## 2020-10-13 NOTE — FLOWSHEET NOTE
[facility]  Phone: [phone]   Fax: [phone]          Physical Therapy Treatment Note/ Progress Report:     Date:  10/13/2020    Patient Name:  Naomie Guerra    :  1975  MRN: [id number]  Restrictions/Precautions:    Medical/Treatment Diagnosis Information:  Diagnosis: S83.512 (ICD-10-CM) - Anterior cruciate ligament complete tear, left  Treatment Diagnosis: L knee pain; L knee stiffness; L LE atrophy; L LE swelling; difficulty walking  Insurance/Certification information:  PT Insurance Information: [employer] [place] - visits per medical necessity; no auth  Physician Information:  Referring Practitioner: Dr. Aura Guerrero of care signed (Y/N): [x]  Yes []  No     Date of Patient follow up with Physician: SAMANTHA     Progress Report: []  Yes  [x]  No     Date Range for reporting period:  Beginnin2020  Endin20    Progress report due (10 Rx/or 30 days whichever is less): visit #29 or      Recertification due (POC duration/ or 90 days whichever is less): visit #30 or 10/28/2020 (6 weeks)    Visit # Insurance Allowable Auth required? Date Range   + 38-12 Medical necessity   &    Not Covered []  Yes  [x]  No n/a     Latex Allergy:  [x]NO      []YES  Preferred Language for Healthcare:   [x]English       []other:    Functional Scale:        Date assessed:  LEFS: raw score = 25/80; dysfunction = 69%  2020    Pain level:  2/10    SUBJECTIVE:    Is feeling better overall but still has numbness, \"mushy\" feeling, and pain along joint line. Only using knee brace in community, no brace at home. OBJECTIVE:   :  AD:  [] Bilateral [] Single - longer distances [x] None - controlled environments, around the home  Brace for ambulation: [] No  [x] Yes - Range: unlocked;  Functional brace    Gait: WBAT without AD, slowed j luis and decreased step length with extensor lag    Mild effusion persisting    No notable skin PT resistance  For glute med activation to improve performance of sit<>stand transitions from table    TKE w/ DF    biodex balance  For improved proprioception and balance                 Manual Intervention (07404)       Manual OP into extension       mob  Gentle for pain modulation              AquaticTherapy Dates of Service: 9/25, 9/29, 10/13  Aquatic Visits Exercises/Activities:  Slow progression with exercises secondary to CRPS diagnosis post ACL Reconstruction. Assess response at each visit. Transfers:          % Immersion:            Ambulation:   UE Exercises:       Forwards x1 Shoulder Shrugs      Lateral  x 1  Shoulder Circles      Retro  x  Scapular Retraction       Marching x  Push Downs       [place]as   Punching     Jog    Rowing     Multifidi walkouts with paddle   Elbow Flex/Ext       Shldr Flex/Ext X20 B  w/NBOS      Shldr aBd/aDd X20 B  w/NBOS   LE Exercises:  Shldr Horiz aBd/aDd X20 B  w/NBOS   HR/TR x12 Shldr IR/ER    Marches x12 Arm Circles    Squats x12 PNF Diagonals    Hamstring Curls x12 B Wall Push Ups    Hip Flexion (SLR) x12 B Figure 8's    Hip aBduction (SLR) x12 B Bilateral Pull Downs    Hip Extension (SLR) x12 B      Hip aDduction (SLR)      Hip Circles x12 B Functional:    Hip IR/Er  Step up forward x12 B   TrA Set   Step up lateral  x12 B   Pelvic Tilts   Step down  x10 L, x8 R   Fig 8's   Lunges Forward    LE PNF  Lunges Retro      Lunges Lateral     Balance:   Lower ab curl with noodle     SLS  2x20\" B      Tandem Stance 2x20\" B      NBOS eyes open x Seated:     NBOS eyes closed x Ankle pumps     Hand to Opposite Knee x Ankle Circles     Fwd Step ups to SLS  Knee Flex/Ext x20 B   Lateral Step ups to SLS  Hip aBd/aDd    Stop/Go Gait   Bicycle     Narrow AYSHA  Ankle DF/PF      Ankle Inv/Ev    Stretching:       Gastroc/Soleus 20\" X2 L     Hamstring  30\" X3 L long sit bench Noodle:     Knee Flex Stretch 3x15\" L Leg Press    Piriformis   Noodle Hang at [facility]    Hip Flexor x Noodle Hang Deep Water    SKTC  Noodle Bicycle     DKTC       L QL stretch in standing with L arm reach overhead to R     ITB/TFL standing L foot cross over with L lateral pelvic shift     Quad x Deep Water:    Mid Back   Jog    UT  Jumping Jacks    Post Shoulder  Heel to Toe    Ladder Pull  Hand to Opposite Knee    Pec Stretch  Rocking Horse      [employer] Skier          Cervical:   Other:     AROM Flexion      AROM Extension      AROM Side Bending      AROM Rotation      Chin Tucks      Chin Nods        Aquatic Abbreviation Key  B= Belt DB= Dumbells T= Theratube   H= Hydrotone N= Noodles W= Weights   P= Paddles S= Speedo equipment K= Kickboard     Pt. Education:  -all pt questions were answered    Home Exercise Program:      9/29:  [place] [place] pt on self patellar mobs & CFM to patellar tendon at end of session, pt reported increased sensitivity but feels she will be able to perform at home.       Access Code: [email or web address]    Date: 07/15/2020   Prepared by: Richy Adame   Exercises   Standing Knee Flexion - 10 reps - 2 sets - 2x daily - 7x weekly   Sidelying Hip Abduction - 10 reps - 2 sets - 2x daily - 7x weekly   Prone Hip Extension - 10 reps - 2 sets - 2x daily - 7x weekly   Seated Long Arc Quad - 10 reps - 2 sets - 2x daily - 7x weekly     Access Code: Gabe Flores   Date: 07/09/2020   Prepared by: Jordon Dasilva   Exercises   Supine Ankle Pumps - 10 reps - 3 sets - 3-5x daily - 7x weekly   Long Sitting Calf Stretch with Strap - 10 reps - 3 sets - 3-5x daily - 7x weekly   Seated Table Hamstring Stretch - 10 reps - 3 sets - 3-5x daily - 7x weekly   Supine Quad Set - 10 reps - 3 sets - 3-5x daily - 7x weekly   Supine Heel Slide with Strap - 10 reps - 3 sets - 3-5x daily - 7x weekly   Seated Knee Flexion AAROM - 10 reps - 3 sets - 3-5x daily - 7x weekly   Supine Knee Flexion AAROM at Wall - 10 reps - 3 sets - 3-5x daily - 7x weekly   Supine Knee Flexion AAROM at Wall - 10 reps - 3 sets - 3-5x daily - 7x weekly     Therapeutic Exercise and NMR EXR  [x] (00108) Provided verbal/tactile cueing for activities related to strengthening, flexibility, endurance, ROM for improvements in LE, proximal hip, and core control with self care, mobility, lifting, ambulation. [x] (03732) Provided verbal/tactile cueing for activities related to improving balance, coordination, kinesthetic sense, posture, motor skill, proprioception  to assist with LE, proximal hip, and core control in self care, mobility, lifting, ambulation and eccentric single leg control.   [] (47112) Therapist is in constant attendance of 2 or more patients providing skilled therapy interventions, but not providing any significant amount of measurable one-on-one time to either patient, for improvements in LE, proximal hip, and core control in self care, mobility, lifting, ambulation and eccentric single leg control.      NMR and Therapeutic Activities:    [x] (03755 or 72586) Provided verbal/tactile cueing for activities related to improving balance, coordination, kinesthetic sense, posture, motor skill, proprioception and motor activation to allow for proper function of core, proximal hip and LE with self care and ADLs  [] (15407) Gait Re-education- Provided training and instruction to the patient for proper LE, core and proximal hip recruitment and positioning and eccentric body weight control with ambulation re-education including up and down stairs     Home Exercise Program:    [x] (15801) Reviewed/Progressed HEP activities related to strengthening, flexibility, endurance, ROM of core, proximal hip and LE for functional self-care, mobility, lifting and ambulation/stair navigation   [] (64591)Reviewed/Progressed HEP activities related to improving balance, coordination, kinesthetic sense, posture, motor skill, proprioception of core, proximal hip and LE for self care, mobility, lifting, and ambulation/stair navigation      Manual Treatments:  PROM / STM / Oscillations-Mobs:  G-I, II, III, IV (PA's, Inf., Post.)  [x] (76969) Provided manual therapy to mobilize LE, proximal hip and/or LS spine soft tissue/joints for the purpose of modulating pain, promoting relaxation,  increasing ROM, reducing/eliminating soft tissue swelling/inflammation/restriction, improving soft tissue extensibility and allowing for proper ROM for normal function with self care, mobility, lifting and ambulation. Modalities:  [] (81991) Vasopneumatic compression: Utilized vasopneumatic compression to decrease edema / swelling for the purpose of improving mobility and quad tone / recruitment which will allow for increased overall function including but not limited to self-care, transfers, ambulation, and ascending / descending stairs. Modalities:  IFC to knee - 10' @ 13.5 amps    Charges:  Timed Code Treatment Minutes: 35   Total Treatment Minutes: 50     [] EVAL - LOW (61609)   [] EVAL - MOD (65146)  [] EVAL - HIGH (66407)  [] RE-EVAL (53004)  [] ZR(47870) x 1      [] Ionto  [] NMR (05281) x       [] Vaso  [] Manual (65593) x       [] Ultrasound  [] TA x 1        [] Mech Traction (92980)  [x] Aquatic Therapy x 2    [] ES (un) (27607):   [] Home Management Training x  [] ES(attended) (01105)   [] Dry Needling 1-2 muscles (94951):  [] Dry Needling 3+ muscles (457337  [x] Group: 1     [] Other:     GOALS:  Patient stated goal: improve ROM and return to normal   [x]? Progressing: []? Met: []? Not Met: []? Adjusted     Therapist goals for Patient:   Short Term Goals: To be achieved in: 2 weeks  1. Independent in HEP and progression per patient tolerance, in order to prevent re-injury. []? Progressing: [x]? Met: []? Not Met: []? Adjusted  2. Patient will have a decrease in pain to facilitate improvement in movement, function, and ADLs as indicated by Functional Deficits. []? Progressing: []? Met: [x]? Not Met: []? Adjusted     Long Term Goals: To be achieved in: 12 weeks  1.  Disability index score of 20% or less for the LEFS to assist with reaching prior level of function. []? Progressing: []? Met: [x]? Not Met: []? Adjusted  2. Patient will demonstrate increased AROM to at least 0-120 to allow for proper joint functioning as indicated by patients Functional Deficits. [x]? Progressing: []? Met: []? Not Met: []? Adjusted  3. Patient will demonstrate an increase in Strength to at least 4+/5 quad as well as good proximal hip strength and control to allow for proper functional mobility as indicated by patients Functional Deficits. []? Progressing: []? Met: [x]? Not Met: []? Adjusted  4. Patient will return to functional activities including walking w/o assistive device without increased symptoms or restriction. [x]? Progressing: []? Met: []? Not Met: []? Adjusted  5. Patient will be able to return to work full duty without restriction. [x]? Progressing: []? Met: []? Not Met: []? Adjusted         Overall Progression Towards Functional goals/ Treatment Progress Update:  [] Patient is progressing as expected towards functional goals listed. [x] Progression is slowed due to complexities/Impairments listed. [] Progression has been slowed due to co-morbidities. [] Plan just implemented, too soon to assess goals progression <30days   [] Goals require adjustment due to lack of progress  [] Patient is not progressing as expected and requires additional follow up with physician  [] Other    Persisting Functional Limitations/Impairments:  []Sitting [x]Standing   [x]Walking [x]Stairs   []Transfers [x]ADLs   [x]Squatting/bending [x]Kneeling  [x]Housework [x]Job related tasks  []Driving [x]Sports/Recreation   [x]Sleeping []Other:    ASSESSMENT:     Patient progressed well since last session, however continues to complain of L knee & quad tightness restricting activity. Also continues to feel that quad is weak resulting in buckling at times. Continue to progress as tolerated.       Treatment/Activity Tolerance:  [] Pt able to complete treatment [] Patient limited by fatique  [x] Patient limited by pain  [x] Patient limited by other medical complications  [] Other:     Prognosis:  [x] Good [] Fair  [] Poor    Patient Requires Follow-up: [x] Yes  [] No    Return to Play:    [x]  N/A    PLAN: See eval. 1-2x/wk for 4-6 weeks. Initiate aquatic therapy. [x] Continue per plan of care [] Alter current plan (see comments)  [] Plan of care initiated [] Hold pending MD visit [] Discharge    Electronically signed by: Grisel Hwang PT    Note: If patient does not return for scheduled/ recommended follow up visits, this note will serve as a discharge from care along with most recent update on progress.

## 2020-10-15 ENCOUNTER — TELEPHONE (OUTPATIENT)
Dept: ORTHOPEDIC SURGERY | Age: 45
End: 2020-10-15

## 2020-10-15 NOTE — LETTER
[facility]  [address] [place] [address]  Phone: [phone]  Fax: [phone]    Meera Chilel MD        October 16, 2020     Patient: Khanh Francisco   YOB: 1975   Date of Visit: 10/15/2020       To Whom It May Concern: It is my medical opinion that José Luis Yañez may return to work on 11/9/2020 with the following restrictions: may work 4 hours per shift with no lifting more than 10bs and no pushing or pulling stretchers or beds. .    If you have any questions or concerns, please don't hesitate to call.     Sincerely,          Meera Chilel MD

## 2020-10-16 NOTE — TELEPHONE ENCOUNTER
Spoke to patient and she states that she had a consult with the physician who will do her injections and is currently waiting on insurance authorization for that. She has received notice from her employer that as of 11/12/20 she will have been off work for 6 months and will be terminated if she does not return to work by that date. She is asking if you feel she could return to work with restrictions. She said she thinks she may be able to work 4 hours per day but knows that she is unable to do any lifting. She said she still feels numb behind her knee cap and still has popping sensations in her knee.

## 2020-10-16 NOTE — TELEPHONE ENCOUNTER
From my perspective patient is OK to return to work. She may perform for hours duty with no lifting more than 10 pounds. No pushing or pulling stretchers/beds.

## 2020-10-19 ENCOUNTER — TELEPHONE (OUTPATIENT)
Dept: ORTHOPEDIC SURGERY | Age: 45
End: 2020-10-19

## 2020-10-19 NOTE — TELEPHONE ENCOUNTER
She needed to speak to Naz Guy concerning a return to work note.   She can be reached at [phone]

## 2020-10-20 ENCOUNTER — HOSPITAL ENCOUNTER (OUTPATIENT)
Dept: PHYSICAL THERAPY | Age: 45
Setting detail: THERAPIES SERIES
Discharge: HOME | End: 2020-10-20
Payer: COMMERCIAL

## 2020-10-20 NOTE — TELEPHONE ENCOUNTER
Spoke to patient and she stated Ying Michelle with [employer] needs an end date for restrictions. I called and spoke to Ying Michelle and let her know patient's next appt with us is 11/23l. She stated she would note that date and asked that we send a new note [email or web address]) at that time and a revised note is not needed now.

## 2020-10-20 NOTE — PROGRESS NOTES
Physical Therapy  Cancellation/No-show Note  Patient Name:  Wili Germain  :  1975   Date:  10/20/2020  Cancelled visits to date: 3  No-shows to date: 0    Patient status for today's appointment patient:  [x]  Cancelled , 10/6, 10/20 (pool)  []  Rescheduled appointment  []  No-show     Reason given by patient:  []  Patient ill  []  Conflicting appointment  []  No transportation    []  Conflict with work  [x]  No reason given  []  Other:  Childcare difficulties   Comments:      Phone call information:   []  Phone call made today to patient at _ time at number provided:      []  Patient answered, conversation as follows:    []  Patient did not answer, message left as follows:  []  Phone call not made today  [x]  Phone call not made today - patient called in and provided reason for cancellation    Electronically signed by:  Veronica Harley, PT, DPT

## 2020-10-22 ENCOUNTER — TELEPHONE (OUTPATIENT)
Dept: ORTHOPEDIC SURGERY | Age: 45
End: 2020-10-22

## 2020-10-22 NOTE — LETTER
[facility]  [facility] [address] [address]  Phone: [phone]  Fax: [phone]    Bryan Newby MD        October 22, 2020     Patient: Rene Acevedo   YOB: 1975   Date of Visit: 10/22/2020       To Whom It May Concern: It is my medical opinion that Wilbert Flores may return to work on 11/9/2020. She may work 8 hours shifts for two weeks, then increase to 12 hours shifts. No other restrictions. .    If you have any questions or concerns, please don't hesitate to call.     Sincerely,        Bryan Newby MD

## 2020-10-22 NOTE — TELEPHONE ENCOUNTER
Pt states work denied her most recent RTW note from 10/16/2020. .. She is wondering if she can go back on 11/9/2020 with 8hr shifts for 2 weeks (no restrictions on lifting pushing or pulling) then increase to 12 hrs shifts after the 2 weeks. Can fax to her work:   Ke[employer]  [phone]

## 2020-10-22 NOTE — TELEPHONE ENCOUNTER
General Question     Subject: RTW NOTE   Patient and /or Facility Request: Has questions about note and restrictions  Contact Number: [phone]

## 2020-10-23 ENCOUNTER — HOSPITAL ENCOUNTER (OUTPATIENT)
Dept: PHYSICAL THERAPY | Age: 45
Setting detail: THERAPIES SERIES
Discharge: HOME | End: 2020-10-23
Payer: COMMERCIAL

## 2020-10-23 NOTE — PROGRESS NOTES
Physical Therapy  Cancellation/No-show Note  Patient Name:  Yessy Hughes  :  1975   Date:  10/23/2020  Cancelled visits to date: 4  No-shows to date: 0    Patient status for today's appointment patient:  [x]  Cancelled , 10/6, 10/20 (pool), 10/23  []  Rescheduled appointment  []  No-show     Reason given by patient:  []  Patient ill  []  Conflicting appointment  []  No transportation    []  Conflict with work  []  No reason given  [x]  Other:  Patient in a lot of pain, getting nerve block next week and r/s to see PT after nerve block   Comments:      Phone call information:   []  Phone call made today to patient at _ time at number provided:      []  Patient answered, conversation as follows:    []  Patient did not answer, message left as follows:  []  Phone call not made today  [x]  Phone call not made today - patient called in and provided reason for cancellation    Electronically signed by:  Babs Roldan, PT, DPT

## 2020-10-27 ENCOUNTER — TELEPHONE (OUTPATIENT)
Dept: ORTHOPEDIC SURGERY | Age: 45
End: 2020-10-27

## 2020-10-27 NOTE — LETTER
[facility]  [address]  [address]  Phone: [phone]  Fax: [phone]    Kaylee Summers MD        October 28, 2020     Patient: Mahi Recio   YOB: 1975   Date of Visit: 10/27/2020       To Whom It May Concern: It is my medical opinion that Yeimi Luna may return to work on 11/9/20 working two 8 hour shifts, then three 8 hour shifts, then full duty on 12 hour shifts. If you have any questions or concerns, please don't hesitate to call.     Sincerely,          Kaylee Summers MD

## 2020-10-27 NOTE — TELEPHONE ENCOUNTER
General Question     Subject: RETURN TO WORK  Patient and /or Facility Request: [address] [place] [address] Dr Number: [phone]

## 2020-10-28 NOTE — TELEPHONE ENCOUNTER
Spoke to patient and she needs a new work note stating her RTW restrictions differently.  This was provided and faxed as requested to Irma Lewis at [phone]

## 2020-10-28 NOTE — TELEPHONE ENCOUNTER
General Question     Subject: Work note  Patient and /or Facility Request: Patient  Contact Number: [phone]

## 2020-11-02 ENCOUNTER — HOSPITAL ENCOUNTER (OUTPATIENT)
Age: 45
Discharge: HOME | End: 2020-11-02
Payer: COMMERCIAL

## 2020-11-02 LAB
EKG ATRIAL RATE: 53 BPM
EKG DIAGNOSIS: NORMAL
EKG P AXIS: 34 DEGREES
EKG P-R INTERVAL: 146 MS
EKG Q-T INTERVAL: 450 MS
EKG QRS DURATION: 82 MS
EKG QTC CALCULATION (BAZETT): 422 MS
EKG R AXIS: 33 DEGREES
EKG T AXIS: 24 DEGREES
EKG VENTRICULAR RATE: 53 BPM

## 2020-11-02 PROCEDURE — 93005 ELECTROCARDIOGRAM TRACING: CPT | Performed by: PSYCHIATRY & NEUROLOGY

## 2020-11-02 PROCEDURE — 93010 ELECTROCARDIOGRAM REPORT: CPT | Performed by: INTERNAL MEDICINE

## 2020-11-04 ENCOUNTER — APPOINTMENT (OUTPATIENT)
Dept: PHYSICAL THERAPY | Age: 45
End: 2020-11-04
Payer: COMMERCIAL

## 2020-11-05 ENCOUNTER — OFFICE VISIT (OUTPATIENT)
Dept: ORTHOPEDIC SURGERY | Age: 45
End: 2020-11-05
Payer: COMMERCIAL

## 2020-11-05 VITALS — WEIGHT: 139.99 LBS | TEMPERATURE: 98.1 F | BODY MASS INDEX: 24.8 KG/M2 | HEIGHT: 63 IN

## 2020-11-05 PROCEDURE — 99213 OFFICE O/P EST LOW 20 MIN: CPT | Performed by: PHYSICIAN ASSISTANT

## 2020-11-05 NOTE — PROGRESS NOTES
12 [facility]  History and Physical  Knee Pain    Date:  2020    Name:  Nancy Guzman  Address:  [address] [place] [address]. [address]    :  1975      Age:   39 y.o.    SSN:  [id number]      Medical Record Number:  [id number]      Chief Complaint    Follow-up (left knee )      History of Present Illness:  Nancy Guzman is a 39 y.o. female who presents for follow-up evaluation of her left knee. Patient is 5 months status post an ACL reconstruction in the left knee conducted on 2020. To recap,  The patient is a nurse from [employer]. Back in April she was playing with her kids when she injured her left knee and felt a pop. She had a reinjury in May and an MRI was done which demonstrated a medial meniscus tear and an ACL tear. She underwent a several weeks of physical therapy before undergoing a left knee ACL reconstruction with bone patella bone autograft and medial meniscus all inside repair with Dr. Erin Jung on 2020. Patient was doing well until the end of August early 2020 when she started noting a worsening of her pain and swelling of the knee. She states that her pain was constant achy with occasional sharp bouts. These bouts came on without reason. She had a negative infectious work-up by Dr. Erin Jung. She states she is still getting these sensations of sharp bouts the lateral joint line that radiate down the anterior spectacle of the left lower extremity. This occurs several times a day. She also noted hypersensitivity with an intolerance to cold, light touch or deep palpation. Patient was started on 300 mg of Neurontin which she attempted to take twice daily but began only taking it at bedtime due to lethargy during the day. Patient states that she has to return to work in the next couple weeks otherwise she will be terminated from her position.   She has not been able to make the last several physical therapy visits as she has been required to take care of her children who are doing at home learning due to the COVID-19 pandemic. She has received 1 sympathetic nerve block from a pain management specialist to attempt to treat her type II CRPS. She feels like this first block did not change much of her symptoms. Overall she feels that she is mildly improved in her condition but feels that she still has weakness, instability and pain. The patient denies any new injury. The patient denies any fevers, chills, nausea or vomiting.       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, not pregnant     Thyroid disease     Auto-immune Hashimotos- no meds        Past Surgical History:   Procedure Laterality Date    ANTERIOR CRUCIATE LIGAMENT REPAIR Left 6/4/2020    ARTHROSCOPIC LEFT KNEE MEDIAL MENISCU REPARI AND ANTERIOR CRUCIATE LIGAMENT BONE PATELLA TENDON BONE RECONSTRUCTION - (REGIONAL BLOCK) performed by Arie Hernandes MD at [address] [place] [address] COLONOSCOPY N/A 11/26/2019    COLONOSCOPY POLYPECTOMY SNARE/COLD BIOPSY performed by Ilsa Solis MD at [place] [address]      UPPER GASTROINTESTINAL ENDOSCOPY N/A 11/26/2019    EGD BIOPSY performed by [facility]lsa [facility]is [facility] at [facility] ENDOSCOPY       Family History   Problem Relation Age of Onset    High Blood Pressure Father     Cancer Father     Diabetes Brother        Social History     Socioeconomic History    Marital status: Single     Spouse name: None  Number of children: None    Years of education: None    Highest education level: None   Occupational History    None   Social Needs    Financial resource strain: None    Food insecurity     Worry: None     Inability: None    Transportation needs     Medical: None     Non-medical: None   Tobacco Use    Smoking status: Never Smoker    Smokeless tobacco: Never Used   Substance and Sexual Activity    Alcohol use: Yes     Comment: socially    Drug use: No    Sexual activity: Yes     Partners: Male   Lifestyle    Physical activity     Days per week: None     Minutes per session: None    Stress: None   Relationships    Social connections     Talks on phone: None     Gets together: None     Attends [employer] service: None     Active member of club or organization: None     Attends meetings of clubs or organizations: None     Relationship status: None    Intimate partner violence     Fear of current or ex partner: None     Emotionally abused: None     Physically abused: None     Forced sexual activity: None   Other Topics Concern    None   Social History Narrative    None       Current Outpatient Medications   Medication Sig Dispense Refill    gabapentin (NEURONTIN) 300 MG capsule Take 1 capsule by mouth 2 times daily for 180 days. Intended supply: 90 days 180 capsule 1    alendronate (FOSAMAX) 70 MG tablet Take 1 tablet by mouth every 7 days Take once per week in the morning with a full glass of water, on an empty stomach, and do not take anything else by mouth or lie down for the next 30 minutes. 4 tablet 3    omeprazole (PRILOSEC) 20 MG delayed release capsule Take 20 mg by mouth Daily       No current facility-administered medications for this visit. Allergies   Allergen Reactions    Sulfa Antibiotics Rash         Review of Systems:  A 14 point review of systems was completed by the patient and is available in the media section of the scanned medical record and was reviewed on 11/5/2020. The review is negative with the exception of those things mentioned in the HPI and Past Medical History   Review of Systems   Musculoskeletal: Positive for arthralgias and myalgias. Neurological: Positive for weakness and numbness (anterolateral joint line of left knee). Vital Signs:   Temp 98.1 °F (36.7 °C) (Infrared)   Ht 5' 2.99\" (1.6 m)   Wt 139 lb 15.9 oz (63.5 kg)   BMI 24.80 kg/m²     General Exam:    General: Idania Garcia is a healthy and well appearing 39y.o. year old female who is sitting comfortably in our office in no acute distress. Neuro: Alert & Oriented x 3,  normal,  no focal deficits noted. Normal mood & affect. Eyes: Sclera clear  Ears: Normal external ear  Mouth: Patient wearing a mask  Pulm: Respirations unlabored and regular   Skin: Warm, well perfused      Knee Examination: Left    Inspection: Well-healed scar from prior surgery. Mild effusion. No ecchymosis, discoloration, erythema, excessive warmth. no gross deformities, no signs of infection. Palpation: There is patellofemoral crepitus, there is medial and lateral joint line tenderness. No other osseous or soft tissue tenderness around the knee. Negative calf tenderness    Range of Motion:  0-125 degrees with patellofemoral discomfort at terminal flexion. Sensation of fullness. Appropriate patellar mobility    Strength:  3/5 quadriceps, 4/5 hamstrings    Special Tests: No ligament instability, valgus and varus stress test are stable at 0 and 30°. Lachman's exhibits a solid endpoint. Negative posterior drawer. Negative Homans sign. Pain with patellar compression. Gait: Steady, antalgic, without the use of assistive devices    Alignment: No varus deformity    Neuro: Sensation equal bilateral lower extremities    Vascular: 2+ posterior tibialis pulse        Radiology:     X-rays obtained and reviewed in office: Lateral view of the left knee.      Impression: No fractures, dislocations, visible tumors, or signs of acute trauma. Patella Baha remains unchanged. No osteophytes or bone growths noted. No loose bodies or foreign bodies. Old MRI from 09/23/2020  Narrative    EXAMINATION:    MRI OF THE LEFT KNEE WITHOUT CONTRAST, 9/23/2020 8:53 am         TECHNIQUE:    Multiplanar multisequence MRI of the left knee was performed without the    administration of intravenous contrast.         COMPARISON:    May 7, 2020         HISTORY:    ORDERING SYSTEM PROVIDED HISTORY: Rupture of anterior cruciate ligament of    left knee, subsequent encounter    TECHNOLOGIST PROVIDED HISTORY:    Reason for exam:->Persistent pain and dysfunction following ACL reconstruction    Is the patient pregnant?->No    Reason for Exam: Pt states recent ACL sx to left knee. No recent trauma, pain    returned after sx    Acuity: Acute    Type of Exam: Initial         Recurrent pain status post ACL reconstruction.         FINDINGS:    MENISCI: A nondisplaced, vertically oriented tear is present involving the    posterior horn of the medial meniscus.  This is new from previous exam.  No    parameniscal cyst identified.  The lateral meniscus is intact, and normal in    signal.         CRUCIATE LIGAMENTS: There has been ACL reconstruction.   ACL graft appears    intact, and normal in position.  The PCL is intact.         EXTENSOR MECHANISM: The extensor mechanism is intact.  Mild quadriceps and    patellar tendinopathy is present.  Graft donor site noted involving the    patellar tendon.         LATERAL COLLATERAL LIGAMENT COMPLEX: The iliotibial band and lateral    collateral ligament complex are intact.         MEDIAL COLLATERAL LIGAMENT COMPLEX: The medial collateral ligament is intact.         KNEE JOINT: A small joint effusion and Baker's cyst are present.         Joint alignment and joint spaces are maintained.  No erosions are present.         BONE MARROW: No acute fracture or dislocation is identified.  No evidence of    osteochondral lesion or erosion.              Impression    1. Status post ACL reconstruction.  The ACL graft appears intact and normal    in position. 2. Vertically oriented tear involving the posterior horn of the medial    meniscus, new from previous MRI. 3. Small joint effusion and Baker's cyst.             Office Procedures:  Orders Placed This Encounter   Procedures    XR KNEE LEFT (1-2 VIEWS)     Standing Status:   Future     Number of Occurrences:   1     Standing Expiration Date:   11/5/2021     Scheduling Instructions:      Lateral view only     Order Specific Question:   Reason for exam:     Answer:   Monitoring postop changes and evaluating for patella Baha. Assessment: Patient is a 39 y.o. female presenting to the office for a follow-up evaluation of her left knee pain. Patient is 5 months status post an ACL reconstruction in the left knee conducted on 06/04/2020. Encounter Diagnoses   Name Primary?  Complex regional pain syndrome type 1 of left lower extremity Yes    6/4/20 LEFT ACL reconstruction      6/4/20 LEFT medial meniscus repair     Acute pain of left knee     Muscle weakness of lower extremity        Medical decision making:  Patient's workup and evaluation were reviewed with the patient in the office today. X-ray imaging was reviewed with the patient. It is encouraging that the patient feels her condition has improved even if she notes it is only mild improvement. My main concern at this time is the patient's continued hypersensitivity, pain most notably in the patellofemoral joint with crepitus, and decreased strength/stability. It was addressed with the patient that she needs to make time to go to physical therapy and conduct home exercises daily in order to increase her strength in the lower extremities. With the patient returning to work she was instructed to utilize her hinged knee brace for additional stability while she is working 8 to 12 hours.   In addition the patient was

## 2020-11-13 ENCOUNTER — HOSPITAL ENCOUNTER (OUTPATIENT)
Dept: PHYSICAL THERAPY | Age: 45
Setting detail: THERAPIES SERIES
Discharge: HOME | End: 2020-11-13
Payer: COMMERCIAL

## 2020-11-13 PROCEDURE — 97110 THERAPEUTIC EXERCISES: CPT | Performed by: PHYSICAL THERAPIST

## 2020-11-13 PROCEDURE — 97530 THERAPEUTIC ACTIVITIES: CPT | Performed by: PHYSICAL THERAPIST

## 2020-11-13 NOTE — PLAN OF CARE
Gilda Pearson  Phone: [phone]   Fax: [phone]       Physical Therapy Re-Certification Plan of Care    Dear  Dr. Sophy Lundberg and Dr. Griselda Requena,    We had the pleasure of treating the following patient for physical therapy services at [facility]. A summary of our findings can be found in the updated assessment below. This includes our plan of care. If you have any questions or concerns regarding these findings, please do not hesitate to contact me at the office phone number checked above. Thank you for the referral.     Physician Signature:________________________________Date:__________________  By signing above (or electronic signature), therapists plan is approved by physician      Overall Response to Treatment:   []Patient is responding well to treatment and improvement is noted with regards to goals   []Patient should continue to improve in reasonable time if they continue HEP   []Patient has plateaued and is no longer responding to skilled PT intervention    []Patient is getting worse and would benefit from return to referring MD   []Patient unable to adhere to initial POC   [x]Other: Pt. Returns to therapy after seeing multiple MDs for CRPS occurring after L ACL reconstruction. Pt. Continues to have pain and weakness limiting daily activities. Hypersensitivity present. Date range of Visits: 20-10/13/20; 20  Total Visits: 23    Recommendation:    [x]Continue PT 1x / wk for 6 weeks.     []Hold PT, pending MD visit        Physical Therapy Treatment Note/ Progress Report:     Date:  2020    Patient Name:  Krystle Lowery    :  1975  MRN: [id number]  Restrictions/Precautions:    Medical/Treatment Diagnosis Information:  Diagnosis: S83.512 (ICD-10-CM) - Anterior cruciate ligament complete tear, left  Treatment Diagnosis: L knee pain; L knee stiffness; L LE atrophy; L LE swelling; difficulty walking  Insurance/Certification information:  PT Insurance Information: [employer] [place] - visits per medical necessity; no auth  Physician Information:  Referring Practitioner: Dr. Timur Piedra of care signed (Y/N): [x]  Yes []  No     Date of Patient follow up with Physician: SAMANTHA     Progress Report: [x]  Yes  []  No     Date Range for reporting period:  Beginnin2020  Endin20    Progress report due (10 Rx/or 30 days whichever is less): visit #33 or     Recertification due (POC duration/ or 90 days whichever is less): visit #29 or 20    Visit # Insurance Allowable Auth required? Date Range   23 Medical necessity   &    Not Covered []  Yes  [x]  No n/a     Latex Allergy:  [x]NO      []YES  Preferred Language for Healthcare:   [x]English       []other:    Functional Scale:        Date assessed:  NPV  LEFS: raw score = 25/80; dysfunction = 69%  2020    Pain level:  4/10    SUBJECTIVE:   Pt. Reports that she continues to have pain in her knee but feels that it is slightly improved. She has worked with multiple MD due to CRPS and tried different medication and interventions with mild relief. Pt. Feels that she is able to walk a little better and her mobility has improved but she continues to have constant pain and swelling limiting standing and daily activities. Pt. Reports that she has to perform stairs with a railing and in a reciprocal pattern. Pt. Returned to work earlier this week and notes that she was sore all over afterward and had increased effusion. Pt. Continues to have intolerance to ice and hyper sensitivity. She is able to use heat on her knee a little and purchased a TENs unit for home for temporary pain relief. OBJECTIVE:   :  AD:  [] Bilateral [] Single - longer distances [x] None   Brace for ambulation: [x] No  [] Yes - Range: unlocked;  Functional brace    Gait: WBAT without AD, slight stiff antalgic gait          PROM AROM     L R L R   Knee Flexion 136  132    Knee Extension   +1        Strength (0-5) / Myotomes Left Right   Hip Flexion - seated (L1-2) 4 pain 4+   Hip Abduction 4 4+   Quads (L2-4) 4- pain 5   Hamstrings 4 4+          RESTRICTIONS/PRECAUTIONS: ACL repair and menisectomy 6/4/2020     Exercises/Interventions:     Slow progression with exercises secondary to CRPS diagnosis post ACL Reconstruction. Assess response at each visit. Therapeutic Exercise (27461)  Resistance / level Sets/sec Reps Notes / Cues   Bike (seat position 4) ROM 5'     IB - Calf stretch  30\"  3    Long Sitting Hamstring stretch  30\" 3    Prone quad stretch  30\" 3           SL ecc sit to stand Table 1 10    Step up 4\" 1 10 Cueing for appropriate weight shift and knee flexion   LSD 2\" 1 10 Cueing to heel tap, appropriate alignment                                                    Therapeutic Activities (01353)        Pt. Educated in desensitization training for knee; discussed activity modification and symptom management for home                                                                      Neuromuscular Re-ed (17013)                                                                                          Manual Intervention ([id number])                                Pt. Education:  -all pt questions were answered    Home Exercise Program:      Access Code: [email or web address]   URL: [email or web address]   Date: 11/13/2020   Prepared by: Regina Bowers     Exercises   Upright Bike - 1x daily - 7x weekly   Gastroc Stretch on Step - 3 reps - 30 hold - 1x daily - 7x weekly   Seated Table Hamstring Stretch - 3 reps - 30 hold - 1x daily - 7x weekly   Prone Quadriceps Stretch with Strap - 3 reps - 30 hold - 1x daily - 7x weekly   Single Leg Sit to Stand with Arms Extended   Step Up - 1x daily   Lateral Step Down   Standing Hip Flexion with Resistance Loop   Side Stepping with Resistance at [address]         Therapeutic Exercise and NMR EXR  [x] (17060) Provided verbal/tactile cueing for activities related to strengthening, flexibility, endurance, ROM for improvements in LE, proximal hip, and core control with self care, mobility, lifting, ambulation. [x] (24058) Provided verbal/tactile cueing for activities related to improving balance, coordination, kinesthetic sense, posture, motor skill, proprioception  to assist with LE, proximal hip, and core control in self care, mobility, lifting, ambulation and eccentric single leg control.   [] (45734) Therapist is in constant attendance of 2 or more patients providing skilled therapy interventions, but not providing any significant amount of measurable one-on-one time to either patient, for improvements in LE, proximal hip, and core control in self care, mobility, lifting, ambulation and eccentric single leg control.      NMR and Therapeutic Activities:    [x] (35076 or 72340) Provided verbal/tactile cueing for activities related to improving balance, coordination, kinesthetic sense, posture, motor skill, proprioception and motor activation to allow for proper function of core, proximal hip and LE with self care and ADLs  [] (09850) Gait Re-education- Provided training and instruction to the patient for proper LE, core and proximal hip recruitment and positioning and eccentric body weight control with ambulation re-education including up and down stairs     Home Exercise Program:    [x] (94979) Reviewed/Progressed HEP activities related to strengthening, flexibility, endurance, ROM of core, proximal hip and LE for functional self-care, mobility, lifting and ambulation/stair navigation   [] (99641)Reviewed/Progressed HEP activities related to improving balance, coordination, kinesthetic sense, posture, motor skill, proprioception of core, proximal hip and LE for self care, mobility, lifting, and ambulation/stair navigation      Manual Treatments:  PROM / STM / Oscillations-Mobs:  G-I, II, III, IV (PA's, Inf., Post.)  [x] Progressing: []? Met: [x]? Not Met: []? Adjusted  2. Patient will demonstrate increased AROM to at least 0-120 to allow for proper joint functioning as indicated by patients Functional Deficits. []? Progressing: [x]? Met: []? Not Met: []? Adjusted  3. Patient will demonstrate an increase in Strength to at least 4+/5 quad as well as good proximal hip strength and control to allow for proper functional mobility as indicated by patients Functional Deficits. [x]? Progressing: []? Met: []? Not Met: []? Adjusted  4. Patient will return to functional activities including walking w/o assistive device without increased symptoms or restriction. []? Progressing: [x]? Met: []? Not Met: []? Adjusted  5. Patient will be able to return to work full duty without restriction. [x]? Progressing: []? Met: []? Not Met: []? Adjusted         Overall Progression Towards Functional goals/ Treatment Progress Update:  [] Patient is progressing as expected towards functional goals listed. [] Progression is slowed due to complexities/Impairments listed. [] Progression has been slowed due to co-morbidities. [] Plan just implemented, too soon to assess goals progression <30days   [] Goals require adjustment due to lack of progress  [] Patient is not progressing as expected and requires additional follow up with physician  [x] Other: progression slowed due to CRPS    Persisting Functional Limitations/Impairments:  []Sitting []Standing   []Walking [x]Stairs   []Transfers [x]ADLs   [x]Squatting/bending [x]Kneeling  [x]Housework [x]Job related tasks  []Driving [x]Sports/Recreation   [x]Sleeping []Other:    ASSESSMENT: Pt. Returns to therapy after seeing multiple MDs for CRPS occurring after L ACL reconstruction. Pt. Continues to have pain and weakness limiting daily activities. Hypersensitivity present. Pt. Educated in desensitization training and activity modification. Discussed additional strengthening exercises for home.  Limited to mostly closed chain activities as pt. Has improved tolerance for these. Discussed slow progression of reps and frequency of exercises to monitor tolerance. Will continue to progress and modify activities as needed. Treatment/Activity Tolerance:  [x] Pt able to complete treatment [] Patient limited by fatique  [] Patient limited by pain  [] Patient limited by other medical complications  [] Other:     Prognosis:  [x] Good [] Fair  [] Poor    Patient Requires Follow-up: [x] Yes  [] No    Return to Play:    [x]  N/A    PLAN: See eval. 1-2x/wk for 4-6 weeks. Initiate aquatic therapy. [x] Continue per plan of care [] Alter current plan (see comments)  [] Plan of care initiated [] Hold pending MD visit [] Discharge    Electronically signed by: Henna Maher PT    Note: If patient does not return for scheduled/ recommended follow up visits, this note will serve as a discharge from care along with most recent update on progress.

## 2020-11-24 ENCOUNTER — HOSPITAL ENCOUNTER (OUTPATIENT)
Dept: PHYSICAL THERAPY | Age: 45
Setting detail: THERAPIES SERIES
Discharge: HOME | End: 2020-11-24
Payer: COMMERCIAL

## 2020-11-24 PROCEDURE — 97140 MANUAL THERAPY 1/> REGIONS: CPT | Performed by: PHYSICAL THERAPIST

## 2020-11-24 PROCEDURE — 97110 THERAPEUTIC EXERCISES: CPT | Performed by: PHYSICAL THERAPIST

## 2020-11-24 NOTE — FLOWSHEET NOTE
[facility]  Phone: [phone]   Fax: [phone]        Physical Therapy Treatment Note/ Progress Report:     Date:  2020    Patient Name:  Aminata Amaro    :  1975  MRN: [id number]  Restrictions/Precautions:    Medical/Treatment Diagnosis Information:  Diagnosis: S83.512 (ICD-10-CM) - Anterior cruciate ligament complete tear, left  Treatment Diagnosis: L knee pain; L knee stiffness; L LE atrophy; L LE swelling; difficulty walking  Insurance/Certification information:  PT Insurance Information: [place] - visits per medical necessity; no auth  Physician Information:  Referring Practitioner: Dr. Jackie Mirza of care signed (Y/N): [x]  Yes []  No     Date of Patient follow up with Physician: SAMANTHA     Progress Report: []  Yes  [x]  No     Date Range for reporting period:  Beginnin20  Ending:     Progress report due (10 Rx/or 30 days whichever is less): visit #33 or     Recertification due (POC duration/ or 90 days whichever is less): visit #29 or 20    Visit # Insurance Allowable Auth required? Date Range   24 Medical necessity   &    Not Covered []  Yes  [x]  No n/a     Latex Allergy:  [x]NO      []YES  Preferred Language for Healthcare:   [x]English       []other:    Functional Scale:        Date assessed:  LEFS: raw score = 48/80; dysfunction = 40%  2020  LEFS: raw score = 25/80; dysfunction = 69%  2020    Pain level:  2-3/10    SUBJECTIVE:  Pt. Reports that her knee is feeling better overall. She continues to have a lot of swelling with working. Pt. Reports compliance with HEP but fatigues quickly. OBJECTIVE:   :  AD:  [] Bilateral [] Single - longer distances [x] None   Brace for ambulation: [x] No  [] Yes - Range: unlocked;  Functional brace    Gait: WBAT without AD, slight stiff antalgic gait          PROM AROM     L R L R   Knee Flexion 136  132    Knee Extension   +1 Provided manual therapy to mobilize LE, proximal hip and/or LS spine soft tissue/joints for the purpose of modulating pain, promoting relaxation,  increasing ROM, reducing/eliminating soft tissue swelling/inflammation/restriction, improving soft tissue extensibility and allowing for proper ROM for normal function with self care, mobility, lifting and ambulation. Modalities:  [] (45838) Vasopneumatic compression: Utilized vasopneumatic compression to decrease edema / swelling for the purpose of improving mobility and quad tone / recruitment which will allow for increased overall function including but not limited to self-care, transfers, ambulation, and ascending / descending stairs. Modalities:      Charges:  Timed Code Treatment Minutes: 26   Total Treatment Minutes: 32     [] EVAL - LOW (04192)   [] EVAL - MOD (88226)  [] EVAL - HIGH (51219)  [] RE-EVAL (24849)  [x] SM(10828) x 1      [] Ionto  [] NMR (03361) x       [] Vaso  [x] Manual (57325) x 1      [] Ultrasound  [] TA x         [] Mech Traction (18823)  [] Aquatic Therapy x     [] ES (un) (97739):   [] Home Management Training x  [] ES(attended) (72484)   [] Dry Needling 1-2 muscles (94091):  [] Dry Needling 3+ muscles (536077  [] Group:      [] Other:     GOALS:  Patient stated goal: improve ROM and return to normal   [x]? Progressing: []? Met: []? Not Met: []? Adjusted     Therapist goals for Patient:   Short Term Goals: To be achieved in: 2 weeks  1. Independent in HEP and progression per patient tolerance, in order to prevent re-injury. []? Progressing: [x]? Met: []? Not Met: []? Adjusted  2. Patient will have a decrease in pain to facilitate improvement in movement, function, and ADLs as indicated by Functional Deficits. []? Progressing: []? Met: [x]? Not Met: []? Adjusted     Long Term Goals: To be achieved in: 12 weeks  1. Disability index score of 20% or less for the LEFS to assist with reaching prior level of function.    []? Progressing: []? Met: [x]? Not Met: []? Adjusted  2. Patient will demonstrate increased AROM to at least 0-120 to allow for proper joint functioning as indicated by patients Functional Deficits. []? Progressing: [x]? Met: []? Not Met: []? Adjusted  3. Patient will demonstrate an increase in Strength to at least 4+/5 quad as well as good proximal hip strength and control to allow for proper functional mobility as indicated by patients Functional Deficits. [x]? Progressing: []? Met: []? Not Met: []? Adjusted  4. Patient will return to functional activities including walking w/o assistive device without increased symptoms or restriction. []? Progressing: [x]? Met: []? Not Met: []? Adjusted  5. Patient will be able to return to work full duty without restriction. [x]? Progressing: []? Met: []? Not Met: []? Adjusted         Overall Progression Towards Functional goals/ Treatment Progress Update:  [] Patient is progressing as expected towards functional goals listed. [] Progression is slowed due to complexities/Impairments listed. [] Progression has been slowed due to co-morbidities. [] Plan just implemented, too soon to assess goals progression <30days   [] Goals require adjustment due to lack of progress  [] Patient is not progressing as expected and requires additional follow up with physician  [x] Other: progression slowed due to CRPS    Persisting Functional Limitations/Impairments:  []Sitting []Standing   []Walking [x]Stairs   []Transfers [x]ADLs   [x]Squatting/bending [x]Kneeling  [x]Housework [x]Job related tasks  []Driving [x]Sports/Recreation   [x]Sleeping []Other:    ASSESSMENT: Pt. Tolerated therapy today with minimal complaints. Focus of treatment on reviewing home program and desensitization. Reviewed correct technique with reciprocal stairs, difficulty controlling eccentric descent due to persisting weakness. Pt. Instructed in SLR holds for additional quad strengthening. Pt.  To follow up with

## 2021-01-06 ENCOUNTER — HOSPITAL ENCOUNTER (OUTPATIENT)
Dept: WOMENS IMAGING | Age: 46
Discharge: HOME | End: 2021-01-06
Payer: COMMERCIAL

## 2021-01-06 DIAGNOSIS — Z12.31 VISIT FOR SCREENING MAMMOGRAM: ICD-10-CM

## 2021-01-06 PROCEDURE — 77063 BREAST TOMOSYNTHESIS BI: CPT

## 2021-01-14 ENCOUNTER — OFFICE VISIT (OUTPATIENT)
Dept: ORTHOPEDIC SURGERY | Age: 46
End: 2021-01-14
Payer: COMMERCIAL

## 2021-01-14 ENCOUNTER — TELEPHONE (OUTPATIENT)
Dept: ORTHOPEDIC SURGERY | Age: 46
End: 2021-01-14

## 2021-01-14 VITALS — TEMPERATURE: 98.4 F | HEIGHT: 63 IN | WEIGHT: 139.99 LBS | BODY MASS INDEX: 24.8 KG/M2

## 2021-01-14 DIAGNOSIS — Z98.890 S/P ACL RECONSTRUCTION: ICD-10-CM

## 2021-01-14 DIAGNOSIS — M25.562 LEFT KNEE PAIN, UNSPECIFIED CHRONICITY: ICD-10-CM

## 2021-01-14 DIAGNOSIS — M17.12 PRIMARY OSTEOARTHRITIS OF LEFT KNEE: ICD-10-CM

## 2021-01-14 DIAGNOSIS — G57.72 COMPLEX REGIONAL PAIN SYNDROME TYPE 2 OF LEFT LOWER EXTREMITY: Primary | ICD-10-CM

## 2021-01-14 PROCEDURE — 99214 OFFICE O/P EST MOD 30 MIN: CPT | Performed by: ORTHOPAEDIC SURGERY

## 2021-01-14 RX ORDER — DULOXETIN HYDROCHLORIDE 30 MG/1
60 CAPSULE, DELAYED RELEASE ORAL
COMMUNITY
Start: 2021-01-11 | End: 2022-05-04

## 2021-01-14 NOTE — TELEPHONE ENCOUNTER
Called patient to check if Dr Onur Rodgers office called for appointment and patient stated yes she does have appointment.

## 2021-01-19 NOTE — PROGRESS NOTES
[facility]  History and Physical  Knee Pain    Date:  2021    Name:  Rosa Cabral  Address:  [address] [address]    :  1975      Age:   39 y.o.    SSN:  [id number]      Medical Record Number:  [id number]      Chief Complaint    Follow-up (left knee )      History of Present Illness:  Rosa Cabral is a 39 y.o. female who presents for follow-up evaluation of her left knee. To quickly recap, the patient had an ACL rupture with an ACL reconstruction conducted on 2020. She had a subsequent injury which she retore her ACL and also had a medial meniscus tear. This was repaired in 2020 he was doing well until 2020 when she continued to notice constant aching and sharp bouts of pain within the left knee. She had a negative work-up by her physician for infection but has continued to exhibit sharp bouts of pain in the lateral joint line and patellofemoral joint with hypersensitivity over the left knee. She has been taking 300 mg of Neurontin at night and cannot take them during the day due to lethargy. Patient states her pain is now constant and sharp. She is getting more pain in the fat pad and patellofemoral joint. She states that she cannot ambulate steps due to pain and has experienced increased crepitus in the patellofemoral joint. During the patient's last visit she was diagnosed with CRPS type II was sent to a pain management physician for sympathetic nerve blocks. She cannot receive lidocaine infusions due to a diagnosis of sinus bradycardia. She has received 1 block so far but states that she has not noticed any relief in her symptoms and actually experienced increased back pain at the injection site. She has been conducting physical therapy as well as a home exercise program.  The patient denies any new injury. The patient denies any  joint locking, paresthesias, or weakness.       Pain Assessment  Location of Pain: Knee  Location Modifiers: Left  Severity of Pain: 4  Quality of Pain: Sharp, Dull  Duration of Pain: Persistent  Frequency of Pain: Constant  Aggravating Factors:  Other (Comment)(everything)  Relieving Factors: Rest  Result of Injury: Yes  Work-Related Injury: No  Are there other pain locations you wish to document?: No    Past Medical History:   Diagnosis Date    Anesthesia     HR has dropped after surgeries in the past    Anxiety     Depression     Disease of blood and blood forming organ     Liden factor V    Factor 5 Leiden mutation, heterozygous ([facility] [address]     Hashimoto's disease     no meds    Postpartum depression     no meds    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, not pregnant     Thyroid disease     Auto-immune Hashimotos- no meds        Past Surgical History:   Procedure Laterality Date    ANTERIOR CRUCIATE LIGAMENT REPAIR Left 6/4/2020    ARTHROSCOPIC LEFT KNEE MEDIAL MENISCU REPARI AND ANTERIOR CRUCIATE LIGAMENT BONE PATELLA TENDON BONE RECONSTRUCTION - (REGIONAL BLOCK) performed by Re Jacobson MD at [address] [place] [address] COLONOSCOPY N/A 11/26/2019    COLONOSCOPY POLYPECTOMY SNARE/COLD BIOPSY performed by Mi Trammell MD at [place] [address]      UPPER GASTROINTESTINAL ENDOSCOPY N/A 11/26/2019    EGD BIOPSY performed by [facility]ell MD at [facility] ENDOSCOPY       Family History   Problem Relation Age of Onset    High Blood Pressure Father     Cancer Father     Diabetes Brother        Social History     Socioeconomic History    Marital status: Single     Spouse name: None    Number of children: None    Years of education: None    Highest education level: None   Occupational History    None   Social Needs    Financial resource strain: None    Food insecurity     Worry: None     Inability: None    Transportation needs Medical: None     Non-medical: None   Tobacco Use    Smoking status: Never Smoker    Smokeless tobacco: Never Used   Substance and Sexual Activity    Alcohol use: Yes     Comment: socially    Drug use: No    Sexual activity: Yes     Partners: Male   Lifestyle    Physical activity     Days per week: None     Minutes per session: None    Stress: None   Relationships    Social connections     Talks on phone: None     Gets together: None     Attends [employer] service: None     Active member of club or organization: None     Attends meetings of clubs or organizations: None     Relationship status: None    Intimate partner violence     Fear of current or ex partner: None     Emotionally abused: None     Physically abused: None     Forced sexual activity: None   Other Topics Concern    None   Social History Narrative    None       Current Outpatient Medications   Medication Sig Dispense Refill    DULoxetine (CYMBALTA) 30 MG extended release capsule       gabapentin (NEURONTIN) 300 MG capsule Take 1 capsule by mouth 2 times daily for 180 days. Intended supply: 90 days 180 capsule 1    omeprazole (PRILOSEC) 20 MG delayed release capsule Take 20 mg by mouth Daily      alendronate (FOSAMAX) 70 MG tablet Take 1 tablet by mouth every 7 days Take once per week in the morning with a full glass of water, on an empty stomach, and do not take anything else by mouth or lie down for the next 30 minutes. (Patient not taking: Reported on 1/14/2021) 4 tablet 3     No current facility-administered medications for this visit. Allergies   Allergen Reactions    Sulfa Antibiotics Rash         Review of Systems:  A 14 point review of systems was completed by the patient and is available in the media section of the scanned medical record and was reviewed on 1/19/2021.   The review is negative with the exception of those things mentioned in the HPI and Past Medical History   Review of Systems   Musculoskeletal: Positive for arthralgias and myalgias. Neurological: Positive for weakness (2/2 to pain). Negative for numbness. Vital Signs:   Temp 98.4 °F (36.9 °C) (Infrared)   Ht 5' 2.99\" (1.6 m)   Wt 139 lb 15.9 oz (63.5 kg)   BMI 24.80 kg/m²     General Exam:    General: Sydni Mcgowan is a healthy and well appearing 39y.o. year old female who is sitting comfortably in our office in no acute distress. Neuro: Alert & Oriented x 3,  normal,  no focal deficits noted. Normal mood & affect. Eyes: Sclera clear  Ears: Normal external ear  Mouth: Patient wearing a mask  Pulm: Respirations unlabored and regular   Skin: Warm, well perfused      Knee Examination: Left    Inspection: Well-healed scars from prior surgery no effusion, ecchymosis, discoloration, erythema, excessive warmth. no gross deformities, no signs of infection. Palpation: There is severe patellofemoral crepitus, there is medial and lateral joint line tenderness. No other osseous or soft tissue tenderness around the knee. Negative calf tenderness    Range of Motion:  0-125 degrees with discomfort at terminal flexion    Strength: Grossly intact    Special Tests: No ligament instability, valgus and varus stress test are stable at 0 and 30°. Lachman's exhibits a solid endpoint. Negative posterior drawer. Negative Homans sign    Gait: Steady, Non antalgic, without the use of assistive devices    Alignment: No varus deformity    Neuro: Sensation equal bilateral lower extremities    Vascular: 2+ posterior tibialis pulse      Contralateral Knee Comparison Examination: Right    Inspection:  No effusion, ecchymosis, discoloration, erythema, excessive warmth. no gross deformities, no signs of infection. Palpation: There is mild patellofemoral crepitus, there is no joint line tenderness. No other osseous or soft tissue tenderness around the knee.   Negative calf tenderness    Range of Motion:  0-130 degrees without pain or difficulty    Strength: Grossly intact    Special Tests: No ligament instability, valgus and varus stress test are stable at 0 and 30°. Lachman's exhibits a solid endpoint. Negative posterior drawer. Negative Homans sign    Gait:  Steady, antalgic, without the use of assistive devices    Alignment: No varus deformity    Neuro: Sensation equal bilateral lower extremities    Vascular: 2+ posterior tibialis pulse           Assessment: Patient is a 39 y.o. female presenting to the office for an evaluation of her chronic left knee pain. Patient has a diagnosis of ACL reconstruction, patellofemoral arthrosis, and CRPS type II. Visit Diagnoses       Codes    Complex regional pain syndrome type 2 of left lower extremity    -  Primary G57.72    Left knee pain, unspecified chronicity     M25.562    Left  ACL reconstruction 6/4/20     Z98.890    Primary osteoarthritis of left knee     M17.12          No orders of the defined types were placed in this encounter. Medical decision making:  Patient's workup and evaluation were reviewed with the patient in the office today. Given the patient's history and physical exam I believe we need to first thoroughly addressed the patient's CRPS before we can address other pathology within the left knee. Having the patient undergo any surgical procedure could only worsen the CRPS. I will be contacting her pain management physician Dr. Raúl Mace to see what other modalities we can utilize to control her CRPS symptoms so that we can further address the pain and other pathology within the left knee. There are also other pain management centers that we may consider should we not be able to appropriately address the patient's condition. She should continue utilizing her gabapentin. I believe the patient will require future scope for further diagnosis and treatment. The patient was warned that if we do not get her symptoms under control this condition could become permanent.   She was advised to seek authoritative second opinions by experienced board-certified pain management specialist as she has not responded to date from the treatment that has been initiated. This does represent a very difficult problem with high decision making this condition can be significantly injures and serious to her overall life and health and ability to carry an occupation all of this was expressed to her in great detail. More than 30 minutes was spent in direct face-to-face evaluation. I did personally call the neurologist and explained the condition and they have would work her in expeditiously to continue the sympathetic blocks and even consideration of a spinal cord stimulator. They understood the necessity for prompt and effective treatment     All the patient's questions were answered while in the clinic. The patient is understanding of all instructions and agrees with the plan. Patient does not smoke and did not require smoking cessation patient education. Treatment Plan:    1. Continue treatment with pain management  2. Activity modification  3. Ice 20 minutes ever 1-2 hours PRN  4. Take medications as prescribed/instructed  5. Rest   6. Elevation at least 2 inches above the heart with pillows supporting the joint underneath  7. Lightweight exercise/low impact exercise  8. Appropriate diet/weight management      Follow up: 4 weeks and as needed                Ivon Saravia PA-C    Physician Assistant - Certified    95/27/13 9:30 AM    During this examination, I, Jak Queen, functioned as a scribe for Dr. Aron Olguin. This dictation was performed with a verbal recognition program (DRAGON) and it was checked for errors. It is possible that there are still dictated errors within this office note. If so, please bring any errors to my attention for an addendum. All efforts were made to ensure that this office note is accurate.     This dictation was performed with a verbal recognition program (DRAGON) and it was checked for errors. It is possible that there are still dictated errors within this office note. If so, please bring any errors to my attention for an addendum. All efforts were made to ensure that this office note is accurate. Supervising Physician Attestation:  I, Dr. Matheus Nails, personally performed the services described in this documentation as scribed above, and it is both accurate and complete and I agree with all pertinent clinical information. I personally interviewed the patient and performed a physical examination and medical decision making. I discussed the patient's condition and treatment options and have  reviewed and agree with the past medical, family and social history unless otherwise noted. All of the patient's questions were answered.       Board Certified Orthopaedic Surgeon  [facility] and [address] [place] [address]  [facility] and [address] [facility] [employer]  Professor of [address]

## 2021-03-01 ENCOUNTER — TELEPHONE (OUTPATIENT)
Dept: ORTHOPEDIC SURGERY | Age: 46
End: 2021-03-01

## 2021-03-01 NOTE — TELEPHONE ENCOUNTER
Called patient to check on her and she said she had her 3rd block and having some relief.  Is following up with physician this week and will be getting back with me regarding an appointment with Dr Lang Linear

## 2021-04-08 ENCOUNTER — OFFICE VISIT (OUTPATIENT)
Dept: ORTHOPEDIC SURGERY | Age: 46
End: 2021-04-08
Payer: COMMERCIAL

## 2021-04-08 VITALS — WEIGHT: 139.99 LBS | BODY MASS INDEX: 24.8 KG/M2 | HEIGHT: 63 IN

## 2021-04-08 DIAGNOSIS — G57.72 COMPLEX REGIONAL PAIN SYNDROME TYPE 2 OF LEFT LOWER EXTREMITY: Primary | ICD-10-CM

## 2021-04-08 DIAGNOSIS — M25.562 LEFT KNEE PAIN, UNSPECIFIED CHRONICITY: ICD-10-CM

## 2021-04-08 DIAGNOSIS — M17.12 PRIMARY OSTEOARTHRITIS OF LEFT KNEE: ICD-10-CM

## 2021-04-08 PROCEDURE — 99214 OFFICE O/P EST MOD 30 MIN: CPT | Performed by: ORTHOPAEDIC SURGERY

## 2021-04-08 RX ORDER — CELECOXIB 100 MG/1
100 CAPSULE ORAL DAILY
Qty: 30 CAPSULE | Refills: 0 | Status: SHIPPED | OUTPATIENT
Start: 2021-04-08 | End: 2021-05-27 | Stop reason: ALTCHOICE

## 2021-04-08 RX ORDER — GABAPENTIN 300 MG/1
300 CAPSULE ORAL 2 TIMES DAILY
Qty: 60 CAPSULE | Refills: 0 | Status: SHIPPED | OUTPATIENT
Start: 2021-04-08 | End: 2021-08-05 | Stop reason: ALTCHOICE

## 2021-04-12 DIAGNOSIS — Z98.890 S/P ACL RECONSTRUCTION: ICD-10-CM

## 2021-04-12 DIAGNOSIS — G57.72 COMPLEX REGIONAL PAIN SYNDROME TYPE 2 OF LEFT LOWER EXTREMITY: Primary | ICD-10-CM

## 2021-04-13 NOTE — PROGRESS NOTES
[facility]  History and Physical  Knee Pain    Date:  2021    Name:  Tony Morelos  Address:  [address] [place] [address]. [address]    :  1975      Age:   42 y.o.    SSN:  [id number]      Medical Record Number:  [id number]      Chief Complaint    Follow-up (left knee )      History of Present Illness:  Tony Morelos is a 42 y.o. female who presents for follow-up evaluation and coordination of care for her left knee pain. To quickly recap, the patient had an ACL rupture with an ACL reconstruction conducted on 2020. She had a subsequent injury which she retore her ACL and also had a medial meniscus tear. This was repaired in 2020 he was doing well until 2020 when she continued to notice constant aching and sharp bouts of pain within the left knee. She has had a negative infectious work-up. Patient has subsequently been diagnosed with CRPS type II and has been sent to pain management for sympathetic and lumbar nerve blocks. She cannot receive lidocaine infusions due to a diagnosis of sinus bradycardia. Thus far she has received 4 total lumbar nerve blocks and has an appointment with her pain management specialist in 1 week. She notes that after receiving a nerve block her pain is worse the next day but overall she states that her pain has improved from 6/10 to 4/10. She has however noticed increased crepitus in the patellofemoral joint. Her knee swells with activity. 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 She notes increased sensations of instability and random sensations of partial giving way with increased activity. She does attest to decreasing sensations of paresthesias over the lateral aspect of her shin. The patient denies any new injury.          Pain Assessment  Location of Pain: Knee  Location Modifiers: Left  Severity of Pain: 4  Quality of Smokeless tobacco: Never Used   Substance and Sexual Activity    Alcohol use: Yes     Comment: socially    Drug use: No    Sexual activity: Yes     Partners: Male   Lifestyle    Physical activity     Days per week: None     Minutes per session: None    Stress: None   Relationships    Social connections     Talks on phone: None     Gets together: None     Attends [employer] service: None     Active member of club or organization: None     Attends meetings of clubs or organizations: None     Relationship status: None    Intimate partner violence     Fear of current or ex partner: None     Emotionally abused: None     Physically abused: None     Forced sexual activity: None   Other Topics Concern    None   Social History Narrative    None       Current Outpatient Medications   Medication Sig Dispense Refill    gabapentin (NEURONTIN) 300 MG capsule Take 1 capsule by mouth 2 times daily for 30 days. Intended supply: 90 days 60 capsule 0    celecoxib (CELEBREX) 100 MG capsule Take 1 capsule by mouth daily She can take the medication in the morning or in the evening. 30 capsule 0    DULoxetine (CYMBALTA) 30 MG extended release capsule       alendronate (FOSAMAX) 70 MG tablet Take 1 tablet by mouth every 7 days Take once per week in the morning with a full glass of water, on an empty stomach, and do not take anything else by mouth or lie down for the next 30 minutes. 4 tablet 3    omeprazole (PRILOSEC) 20 MG delayed release capsule Take 20 mg by mouth Daily       No current facility-administered medications for this visit. Allergies   Allergen Reactions    Sulfa Antibiotics Rash         Review of Systems:  A 14 point review of systems was completed by the patient and is available in the media section of the scanned medical record and was reviewed on 4/12/2021.   The review is negative with the exception of those things mentioned in the HPI and Past Medical History         Vital Signs:   Ht 5' 2.99\" (1.6 m)   Wt 139 lb 15.9 oz (63.5 kg)   BMI 24.80 kg/m²     General Exam:    General: Miguel Duckworth is a healthy and well appearing 42y.o. year old female who is sitting comfortably in our office in no acute distress. Neuro: Alert & Oriented x 3,  normal,  no focal deficits noted. Normal mood & affect. Eyes: Sclera clear  Ears: Normal external ear  Mouth: Patient wearing a mask  Pulm: Respirations unlabored and regular   Skin: Warm, well perfused    Knee Examination: Left     Inspection:  Well-healed scar from prior surgery. Soft tissue swelling noted to the left lower extremity with pale discoloration of the skin from the knee to the ankle. Well-healed scars from prior surgery no effusion, ecchymosis, discoloration, erythema, excessive warmth. no gross deformities, no signs of infection.      Palpation: There is severe patellofemoral crepitus, there is medial and lateral joint line tenderness. Mild tenderness over patellar tendon and infrapatellar fat pad. No other osseous or soft tissue tenderness around the knee. Negative calf tenderness     Range of Motion:  0-130 degrees with discomfort at terminal flexion     Strength: Grossly intact     Special Tests: No ligament instability, valgus and varus stress test are stable at 0 and 30°. Lachman's exhibits a solid endpoint. Negative posterior drawer. Negative Homans sign     Gait: Steady, Non antalgic, without the use of assistive devices     Alignment: No varus deformity     Neuro: Hypersensitivity to the lateral joint line. Otherwise sensation equal bilateral lower extremities     Vascular: 2+ posterior tibialis pulse         Assessment: Patient is a 42 y.o. female presenting to the office for follow-up evaluation of her left knee pain. Patient has a diagnosis of CRPS type II as well as patellofemoral arthrosis.     Visit Diagnoses       Codes    Complex regional pain syndrome type 2 of left lower extremity    -  Primary G57.72    Primary osteoarthritis of left knee     M17.12    Left knee pain, unspecified chronicity     M25.562          Orders Placed This Encounter   Medications    gabapentin (NEURONTIN) 300 MG capsule     Sig: Take 1 capsule by mouth 2 times daily for 30 days. Intended supply: 90 days     Dispense:  60 capsule     Refill:  0    celecoxib (CELEBREX) 100 MG capsule     Sig: Take 1 capsule by mouth daily She can take the medication in the morning or in the evening. Dispense:  30 capsule     Refill:  0       Medical decision making:  Patient's workup and evaluation were reviewed with the patient in the office today. Previously obtain x-ray imaging was once again reviewed with the patient. Given the patient's history and physical exam I believe she is getting some improvement from her treatment with CRPS however she is still exhibiting symptoms requiring further treatment and possible alterations to her management. Her medications of gabapentin were refilled and we will try to put her on Celebrex 100 mg once a day to decrease the pain and inflammation from her arthritis. She does get acid reflux from the utilization of NSAIDs we will be monitoring this closely. She was instructed to continue treatment with her pain management and discussed the work-up and treatment plan with this physician next week. There are further treatments for CRPS and the patient may benefit from a recommendation to  pain management as well as [facility] clinic which has a remarkable RSD treatment team.  We are continuing to attempt to get this under control to prevent this from becoming a permanent condition. She should continue with the lumbar blocks and we will acquire an MRI with T2 generation to evaluate the chondral surfaces to evaluate for any further pathology that can be treated from an orthopedic perspective. All the patient's questions were answered while in the clinic.   The patient is understanding of all instructions and agrees with the plan.      Treatment Plan:    1. Continue with pain management  2. MRI of the left knee with T2 generation without contrast  3. Activity modification  4. Ice 20 minutes ever 1-2 hours PRN  5. Take medications as prescribed/instructed  6. Rest   7. Elevation at least 2 inches above the heart with pillows supporting the joint underneath  8. Lightweight exercise/low impact exercise  9. Appropriate diet/weight management      Follow up: 1 week to review MRI results and as needed    This patient exhibits moderate complexity for obtaining an independent history, reviewing past medical records, independent interpretation of diagnostic imaging, and further coordinating care. The patient exhibits high risk given that the patient is being treated with conservative therapy for multiple conditions with the possibility of her CRPS condition becoming permanent. Approximately 30 minutes were spent reviewing past medical records, imaging, educating the patient, and coordinating care. Approximately 50% of this time was spent with the patient face-to-face. Haylee Mills PA-C    Physician Assistant - Certified    51/64/82 8:35 PM    During this examination, [place], functioned as a scribe for Dr. Pippa Noyola. This dictation was performed with a verbal recognition program (DRAGON) and it was checked for errors. It is possible that there are still dictated errors within this office note. If so, please bring any errors to my attention for an addendum. All efforts were made to ensure that this office note is accurate. This dictation was performed with a verbal recognition program (DRAGON) and it was checked for errors. It is possible that there are still dictated errors within this office note. If so, please bring any errors to my attention for an addendum. All efforts were made to ensure that this office note is accurate.     Supervising Physician Attestation:  I, Dr. Pippa Noyola, personally

## 2021-05-24 ENCOUNTER — TELEPHONE (OUTPATIENT)
Dept: ORTHOPEDIC SURGERY | Age: 46
End: 2021-05-24

## 2021-05-24 NOTE — TELEPHONE ENCOUNTER
General Question     Subject: Patient would like to know if she should still be taking Celebrex  Patient Jake Archie  Contact Number: [phone]

## 2021-05-27 ENCOUNTER — OFFICE VISIT (OUTPATIENT)
Dept: ORTHOPEDIC SURGERY | Age: 46
End: 2021-05-27
Payer: COMMERCIAL

## 2021-05-27 VITALS — HEIGHT: 63 IN | BODY MASS INDEX: 24.8 KG/M2 | WEIGHT: 139.99 LBS

## 2021-05-27 DIAGNOSIS — G57.72 COMPLEX REGIONAL PAIN SYNDROME TYPE 2 OF LEFT LOWER EXTREMITY: ICD-10-CM

## 2021-05-27 DIAGNOSIS — M17.12 PATELLOFEMORAL ARTHRITIS OF LEFT KNEE: Primary | ICD-10-CM

## 2021-05-27 PROCEDURE — 99213 OFFICE O/P EST LOW 20 MIN: CPT | Performed by: ORTHOPAEDIC SURGERY

## 2021-05-27 RX ORDER — PREGABALIN 50 MG/1
CAPSULE ORAL
COMMUNITY
Start: 2021-05-14 | End: 2022-05-04

## 2021-05-27 RX ORDER — CELECOXIB 100 MG/1
100 CAPSULE ORAL DAILY
Qty: 60 CAPSULE | Refills: 3 | Status: SHIPPED | OUTPATIENT
Start: 2021-05-27 | End: 2022-05-04

## 2021-05-27 NOTE — PROGRESS NOTES
[facility] [address] [facility]  Office Visit  Follow up  Date:  2021    Name:  Ann Marie Agustin  Address:  [address] [place] [address]. [address]    :  1975      Age:   42 y.o.    SSN:  [id number]      Medical Record Number:  [id number]    Chief Complaint:    Follow-up for left lower extremity CRPS status post ACL reconstruction as well as left patellofemoral osteoarthritis    HPI:   Ann Marie Agustin is a 42 y.o. female who is following up regarding the above procedure. She states her last lumbar block was a few months ago when she stopped the secondary to having no improvement. She states her pain management team did discontinue her gabapentin and has her on Lyrica at this point. They also increased her Cymbalta and have recommended a spinal stimulator for which she is not yet had done. She did say that her pain and swelling about her patellofemoral joint has improved since taking Celebrex however has ran out of her medication. She states she is sleeping well and only having aching occasionally. 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 She denies any new numbness, tingling, fevers, chills, chest pain, shortness of breath, or any other new significant symptoms. Pain Assessment  Location of Pain: Knee  Location Modifiers: Left  Severity of Pain: 2  Quality of Pain: Aching  Duration of Pain: Persistent  Frequency of Pain: Constant  Aggravating Factors:  Other (Comment) (n/a)  Relieving Factors: Rest, Nsaids  Result of Injury: Yes  Work-Related Injury: No  Are there other pain locations you wish to document?: No    Past History:  Past Medical History:   Diagnosis Date    Anesthesia     HR has dropped after surgeries in the past    Anxiety     Depression     Disease of blood and blood forming organ     Liden factor V    Factor 5 Leiden mutation, heterozygous ([facility]     Hashimoto's disease     no meds    Postpartum depression     no meds  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, not pregnant     Thyroid disease     Auto-immune Hashimotos- no meds       Past Surgical History:   Procedure Laterality Date    ANTERIOR CRUCIATE LIGAMENT REPAIR Left 6/4/2020    ARTHROSCOPIC LEFT KNEE MEDIAL MENISCU REPARI AND ANTERIOR CRUCIATE LIGAMENT BONE PATELLA TENDON BONE RECONSTRUCTION - (REGIONAL BLOCK) performed by Tremayne Sanchez MD at [address] COLONOSCOPY N/A 11/26/2019    COLONOSCOPY POLYPECTOMY SNARE/COLD BIOPSY performed by Hugo Arellano MD at [address] [address] [facility]      TONSILLECTOMY      UPPER GASTROINTESTINAL ENDOSCOPY N/A 11/26/2019    EGD BIOPSY [facility] [address] [facility] [address] History     Tobacco Use    Smoking status: Never Smoker    Smokeless tobacco: Never Used   Vaping Use    Vaping Use: Never used   Substance Use Topics    Alcohol use: Yes     Comment: socially    Drug use: No        Family History:  family history includes Cancer in her father; Diabetes in her brother; High Blood Pressure in her father. Current Outpatient Medications:     pregabalin (LYRICA) 50 MG capsule, , Disp: , Rfl:     celecoxib (CELEBREX) 100 MG capsule, Take 1 capsule by mouth daily, Disp: 60 capsule, Rfl: 3    DULoxetine (CYMBALTA) 30 MG extended release capsule, 60 mg , Disp: , Rfl:     omeprazole (PRILOSEC) 20 MG delayed release capsule, Take 20 mg by mouth Daily, Disp: , Rfl:     gabapentin (NEURONTIN) 300 MG capsule, Take 1 capsule by mouth 2 times daily for 30 days. Intended supply: 90 days, Disp: 60 capsule, Rfl: 0    alendronate (FOSAMAX) 70 MG tablet, Take 1 tablet by mouth every 7 days Take once per week in the morning with a full glass of water, on an empty stomach, and do not take anything else by mouth or lie down for the next 30 minutes.  (Patient not taking: Reported on 5/27/2021), Disp: 4 tablet, Rfl: 3      Allergies   Allergen Reactions    Sulfa Antibiotics Rash         Review of Systems: A 14 point review of systems available in the scanned medical record as documented by the patient on 5/27/21. Today's review pertinent items are noted in HPI. Review of Systems   Neurological: Positive for headaches. Chronic pain   Hematological:        Blood disorders or cancer   All other systems reviewed and are negative. Physical Exam:  Ht 5' 2.99\" (1.6 m)   Wt 139 lb 15.9 oz (63.5 kg)   BMI 24.80 kg/m²       General: No acute distress, well nourished  CV: No obvious peripheral edema. Normal peripheral pulses  Neuro: Alert & oriented x 3  Psych: Normal mood and affect    Knee Examination:  Left     Inspection:  No edema or effusion  Alignment: Normal  Palpation: There is  significant patellofemoral crepitus, there is  no medial or lateral joint line tenderness. Patient does have hypersensitivity noted with light touch, no skin texture changes noted  Range of Motion:   0 to 130 degrees  Strength: Motor is grossly intact  Stability: Knee stable to varus valgus testing at 0 and 30 degrees, negative Lachman  Special Tests:    Positive patellofemoral compression, pain with medial lateral patellar glide  Gait:  Normal  Neuro: Sensation equal bilateral lower extremities   Vascular: 2+ posterior tibialis pulse       Radiographic:  No new imaging today    Assessment:  Karly Linn is a 42 y.o. female with:  1. CRPS left lower extremity  2. Left patellofemoral osteoarthritis    Impression:  No diagnosis found. Office Procedures:  No orders of the defined types were placed in this encounter. Plan:   Pertinent imaging was reviewed. The etiology, natural history, and treatment options for the disorder were discussed.   The roles of activity modifications, medications, injections, physical therapy, and surgical interventions were all described to the patient and questions were answered. Discussed with the patient that we are pleased that she has improved overall roughly 75% from our first visit with us. We did discuss that the last 25% improvement would likely require a job modification to a more sedentary position. 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 Regarding her patellofemoral arthritis and symptoms we will refill her Celebrex today and she can continue taking this as needed. We did offer a steroid injection today however the patient would like to hold off at this time. She will need to follow-up in 2 months for repeat clinical exam.  If she does have a flareup and wants an injection prior to this she was instructed to call the office and have this scheduled. This patient exhibits moderate complexity for obtaining an independent history, reviewing past medical records, independent interpretation of diagnostic imaging, and further coordinating care. The patient exhibits moderate risk. Approximately 30 minutes were spent reviewing past medical records, imaging, educating the patient, and coordinating care. Poonam Maurer Fellow    The encounter with Hailey Nevarez was supervised by Dr Estelle Womack who personally examined the patient and reviewed the plan. This dictation was performed with a verbal recognition program (DRAGON) and it was checked for errors. It is possible that there are still dictated errors within this office note. If so, please bring any errors to my attention for an addendum. All efforts were made to ensure that this office note is accurate. This dictation was performed with a verbal recognition program (DRAGON) and it was checked for errors. It is possible that there are still dictated errors within this office note. If so, please bring any errors to my attention for an addendum. All efforts were made to ensure that this office note is accurate.     Supervising Physician Attestation:  I, Dr. Lucita Aguila, personally performed the services described in this documentation as scribed above, and it is both accurate and complete and I agree with all pertinent clinical information. I personally interviewed the patient and performed a physical examination and medical decision making. I discussed the patient's condition and treatment options and have  reviewed and agree with the past medical, family and social history unless otherwise noted. All of the patient's questions were answered.       Board Certified Orthopaedic Surgeon  [facility] and [address] and [address] [employer]  Professor of [address]

## 2021-08-05 ENCOUNTER — OFFICE VISIT (OUTPATIENT)
Dept: ORTHOPEDIC SURGERY | Age: 46
End: 2021-08-05
Payer: COMMERCIAL

## 2021-08-05 VITALS — HEIGHT: 63 IN | BODY MASS INDEX: 24.8 KG/M2 | WEIGHT: 139.99 LBS

## 2021-08-05 DIAGNOSIS — G90.522 COMPLEX REGIONAL PAIN SYNDROME TYPE 1 OF LEFT LOWER EXTREMITY: ICD-10-CM

## 2021-08-05 DIAGNOSIS — Z98.890 S/P ACL RECONSTRUCTION: ICD-10-CM

## 2021-08-05 DIAGNOSIS — M17.12 PRIMARY OSTEOARTHRITIS OF LEFT KNEE: Primary | ICD-10-CM

## 2021-08-05 PROCEDURE — 99214 OFFICE O/P EST MOD 30 MIN: CPT | Performed by: ORTHOPAEDIC SURGERY

## 2021-08-09 NOTE — PROGRESS NOTES
[facility]  History and Physical  Knee Pain    Date:  2021    Name:  Maryann Rodriguez  Address:  [address] [place] [id number]    :  1975      Age:   42 y.o.    SSN:  [id number]      Medical Record Number:  [id number]      Chief Complaint    Follow-up (left knee)      History of Present Illness:  Maryann Rodriguez is a 42 y.o. female who presents for follow-up evaluation of her left knee pain. This patient is being treated with conservative therapy for left lower extremity CRPS status post ACL reconstruction with patellofemoral osteoarthritis. Patient has been receiving lumbar sympathetic blocks and feels that her CRPS is improving however she notes that the sympathetic blocks have recently not provide her with any additional relief. She is no longer taking gabapentin but is still on Cymbalta and Lyrica. She now states she feels dull achy pain within the medial joint line of the left knee with no new associated injury. She has felt this pain before and is attributed to osteoarthritis within the joint. She states the pain is persistent dull and achy with sharp intermittent bouts. She rates it a 4/10 and states is worse with prolonged activity particularly when she is on her feet for long durations of time. 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 She has attempted conservative therapy for this condition including: rest, ice, elevation, bracing, home exercises, activity modification, NSAIDs as needed, corticosteroid injections. The patient denies any new injury. The patient denies any giving way, joint locking, numbness, paresthesias, or weakness. Prior history from last visit on 2021:   She states her last lumbar block was a few months ago when she stopped the secondary to having no improvement.   She states her pain management team did discontinue her gabapentin and has her on Lyrica at this point. They also increased her Cymbalta and have recommended a spinal stimulator for which she is not yet had done. She did say that her pain and swelling about her patellofemoral joint has improved since taking Celebrex however has ran out of her medication. She states she is sleeping well and only having aching occasionally. 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      Pain Assessment  Location of Pain: Knee  Location Modifiers: Left  Severity of Pain: 4  Quality of Pain: Sharp, Dull, Aching  Duration of Pain: Persistent  Frequency of Pain: Constant  Aggravating Factors: Walking, Stairs (working)  Relieving Factors: Rest, Nsaids  Result of Injury: Yes  Work-Related Injury: No  Are there other pain locations you wish to document?: No    Past Medical History:   Diagnosis Date    Anesthesia     HR has dropped after surgeries in the past    Anxiety     Depression     Disease of blood and blood forming organ     Liden factor V    Factor 5 Leiden mutation, heterozygous ([address]     Hashimoto's disease     no meds    Postpartum depression     no meds    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, not pregnant     Thyroid disease     Auto-immune Hashimotos- no meds        Past Surgical History:   Procedure Laterality Date    ANTERIOR CRUCIATE LIGAMENT REPAIR Left 6/4/2020    ARTHROSCOPIC LEFT KNEE MEDIAL MENISCU REPARI AND ANTERIOR CRUCIATE LIGAMENT BONE PATELLA TENDON BONE RECONSTRUCTION - (REGIONAL BLOCK) performed by Boris Montelongo MD at [address] [facility] COLONOSCOPY N/A 11/26/2019    COLONOSCOPY POLYPECTOMY SNARE/COLD BIOPSY performed by Alex Trujillo MD at [address] [facility] GASTROINTESTINAL ENDOSCOPY N/A 11/26/2019    EGD BIOPSY performed by Alex Trujillo MD at [address] [facility] ENDOSCOPY       Family History   Problem Relation Age of Onset    High Blood Pressure Father     Cancer Father     Diabetes Brother        Social History     Socioeconomic History    Marital status: Single     Spouse name: Not on file    Number of children: Not on file    Years of education: Not on file    Highest education level: Not on file   Occupational History    Not on file   Tobacco Use    Smoking status: Never Smoker    Smokeless tobacco: Never Used   Vaping Use    Vaping Use: Never used   Substance and Sexual Activity    Alcohol use: Yes     Comment: socially    Drug use: No    Sexual activity: Yes     Partners: Male   Other Topics Concern    Not on file   Social History Narrative    Not on file     Social Determinants of Health     Financial Resource Strain:     Difficulty of Paying Living Expenses:    Food Insecurity:     Worried About Running Out of Food in the Last Year:     [address] in the Last Year:    Transportation Needs:     Lack of Transportation (Medical):      Lack of Transportation (Non-Medical):    Physical Activity:     Days of Exercise per Week:     Minutes of Exercise per Session:    Stress:     Feeling of Stress :    Social Connections:     Frequency of Communication with Friends and Family:     Frequency of Social Gatherings with Friends and Family:     Attends Congregation Services:     Active Member of Clubs or Organizations:     Attends Club or Organization Meetings:     Marital Status:    Intimate Partner Violence:     Fear of Current or Ex-Partner:     Emotionally Abused:     Physically Abused:     Sexually Abused:        Current Outpatient Medications   Medication Sig Dispense Refill    diclofenac (VOLTAREN) 50 MG EC tablet Take 1 tablet by mouth 2 times daily for 14 days 28 tablet 0    pregabalin (LYRICA) 50 MG capsule       celecoxib (CELEBREX) 100 MG capsule Take 1 capsule by mouth daily 60 capsule 3    DULoxetine (CYMBALTA) 30 MG extended release capsule 60 mg       alendronate (FOSAMAX) 70 MG tablet Take 1 tablet by mouth every 7 days Take once per week in the morning with a full glass of water, on an empty stomach, and do not take anything else by mouth or lie down for the next 30 minutes. 4 tablet 3    omeprazole (PRILOSEC) 20 MG delayed release capsule Take 20 mg by mouth Daily       No current facility-administered medications for this visit. Allergies   Allergen Reactions    Sulfa Antibiotics Rash         Review of Systems:  A 14 point review of systems was completed by the patient and is available in the media section of the scanned medical record and was reviewed on 8/9/2021. The review is negative with the exception of those things mentioned in the HPI and Past Medical History         Vital Signs:   Ht 5' 2.99\" (1.6 m)   Wt 139 lb 15.9 oz (63.5 kg)   BMI 24.80 kg/m²     General Exam:    General: Elijah Flaco is a healthy and well appearing 42y.o. year old female who is sitting comfortably in our office in no acute distress. Neuro: Alert & Oriented x 3,  normal,  no focal deficits noted. Normal mood & affect. Eyes: Sclera clear  Ears: Normal external ear  Mouth: Patient wearing a mask  Pulm: Respirations unlabored and regular   Skin: Warm, well perfused      Knee Examination: Left    Inspection: Well-healed scar from prior surgery no effusion, ecchymosis, discoloration, erythema, excessive warmth. no gross deformities, no signs of infection. Palpation: There is patellofemoral crepitus, there is medial joint line tenderness. No other osseous or soft tissue tenderness around the knee. Negative calf tenderness    Range of Motion:  0-135 degrees without pain or difficulty    Strength: Grossly intact    Special Tests: No ligament instability, valgus and varus stress test are stable at 0 and 30°. Lachman's exhibits a solid endpoint. Negative posterior drawer. Negative Homans sign. Positive Sheldon's.   Positive flexion test.    Gait: Steady, antalgic, without the use of assistive devices    Alignment: Neutral    Neuro: Numbness over the lateral aspect of the left knee. Otherwise sensation equal bilateral lower extremities    Vascular: 2+ posterior tibialis pulse          Radiology:   Previously obtain imaging reviewed. No new images obtained. Assessment: Patient is a 42 y.o. female presenting to the office for follow-up evaluation of her left knee. Patient is being treated with conservative therapy for CRPS of the left lower extremity as well as osteoarthritis in the left knee. Visit Diagnoses       Codes    Primary osteoarthritis of left knee    -  Primary M17.12    Left  ACL reconstruction 6/4/20     Z98.890    Complex regional pain syndrome type 1 of left lower extremity     G90.522          Orders Placed This Encounter   Medications    diclofenac (VOLTAREN) 50 MG EC tablet     Sig: Take 1 tablet by mouth 2 times daily for 14 days     Dispense:  28 tablet     Refill:  0       Medical decision making:  Patient's workup and evaluation were reviewed with the patient in the office today. Previously obtained imaging reviewed with the patient. Patient is exhibiting improvement in her CRPS symptoms. This is being managed by an outside physician. Patient seems to be exhibiting increased osteoarthritis of the left knee which is likely exacerbated due to the patient's work requirements of being on her feet for 10 to 12-hour shifts 4 to 5 days a week. Patient is also exhibiting increased mechanical symptoms and sharp catching pain in the medial joint line. Should this continue to persist for 4 weeks I believe the patient medical necessity to obtain an MRI without contrast with T2 generation of the left knee to evaluate for medial meniscus tear. Until then the patient should utilize conservative therapy as detailed in the treatment plan below.     The patient was advised that NSAID-type medications have several potential side effects that include: gastrointestinal irritation including hemorrhage, renal injury, as well as an increased risk for heart attack and stroke. The patient was asked to take the medication with food and to stop if there is any symptoms of GI upset, including heartburn, nausea, increased gas or diarrhea. I asked the patient to contact their medical provider for vomiting, abdominal pain or black/bloody stools. The patient should have renal function testing per his medical provider periodically if the medication is taken on a regular basis. The patient should be alert for any swelling in the lower extremities and should stop taking the medication immediately and contact their medical provider should this occur. In addition, the patient should stop taking the medication immediately and contact their medical provider should there be any shortness of breath, fatigue and be evaluated in an emergency facility for any chest pain. All the patient's questions were answered while in the clinic. The patient is understanding of all instructions and agrees with the plan. Treatment Plan:    1. Activity modification  2. Ice 20 minutes ever 1-2 hours PRN  3. Take medications as prescribed/instructed  4. Rest   5. Elevation at least 2 inches above the heart with pillows supporting the joint underneath  6. Lightweight exercise/low impact exercise  7. Appropriate diet/weight management      Follow up: 4 weeks and as needed    This patient exhibits moderate complexity for obtaining an independent history, reviewing past medical records, independent interpretation/review of diagnostic imaging, and further coordinating care. The patient exhibits low risk given that the patient is being treated with conservative therapy. Approximately 30 minutes were spent reviewing past medical records, imaging, educating the patient, and coordinating care.           Bridgette Conner PA-C    Physician Assistant - Certified    81/07/08 4:38 PM    During this examination, Juan WAHL

## 2021-08-16 ENCOUNTER — TELEPHONE (OUTPATIENT)
Dept: ORTHOPEDIC SURGERY | Age: 46
End: 2021-08-16

## 2021-08-16 NOTE — TELEPHONE ENCOUNTER
PATIENT STATES THAT SHE IS HAVING PAIN IN HER KNEE AND WANTED TO SPEAK WITH SOME REGARDING NEXT STEPS BEFORE SCHEDULING OFFICE VISIT.  [facility] [phone]

## 2021-08-18 ENCOUNTER — HOSPITAL ENCOUNTER (OUTPATIENT)
Age: 46
Discharge: HOME | End: 2021-08-18
Payer: COMMERCIAL

## 2021-08-18 LAB
AMPHETAMINE SCREEN, URINE: NORMAL
BARBITURATE SCREEN URINE: NORMAL
BENZODIAZEPINE SCREEN, URINE: NORMAL
BUPRENORPHINE URINE: NORMAL
CANNABINOID SCREEN URINE: NORMAL
COCAINE METABOLITE SCREEN URINE: NORMAL
Lab: NORMAL
METHADONE SCREEN, URINE: NORMAL
OPIATE SCREEN URINE: NORMAL
OXYCODONE URINE: NORMAL
PH UA: 5.5
PHENCYCLIDINE SCREEN URINE: NORMAL
PROPOXYPHENE SCREEN: NORMAL

## 2021-08-18 PROCEDURE — 80307 DRUG TEST PRSMV CHEM ANLYZR: CPT

## 2021-08-23 DIAGNOSIS — Z98.890 S/P ACL RECONSTRUCTION: Primary | ICD-10-CM

## 2021-08-23 DIAGNOSIS — G90.522 COMPLEX REGIONAL PAIN SYNDROME TYPE 1 OF LEFT LOWER EXTREMITY: ICD-10-CM

## 2021-09-23 ENCOUNTER — OFFICE VISIT (OUTPATIENT)
Dept: ORTHOPEDIC SURGERY | Age: 46
End: 2021-09-23
Payer: COMMERCIAL

## 2021-09-23 VITALS — BODY MASS INDEX: 24.8 KG/M2 | WEIGHT: 139.99 LBS | HEIGHT: 63 IN

## 2021-09-23 DIAGNOSIS — G90.522 COMPLEX REGIONAL PAIN SYNDROME TYPE 1 OF LEFT LOWER EXTREMITY: ICD-10-CM

## 2021-09-23 DIAGNOSIS — M17.12 PRIMARY OSTEOARTHRITIS OF LEFT KNEE: Primary | ICD-10-CM

## 2021-09-23 DIAGNOSIS — Z98.890 S/P ACL RECONSTRUCTION: ICD-10-CM

## 2021-09-23 PROCEDURE — L1812 KO ELASTIC W/JOINTS PRE OTS: HCPCS | Performed by: ORTHOPAEDIC SURGERY

## 2021-09-23 PROCEDURE — 20610 DRAIN/INJ JOINT/BURSA W/O US: CPT | Performed by: ORTHOPAEDIC SURGERY

## 2021-09-23 RX ORDER — METHYLPREDNISOLONE ACETATE 40 MG/ML
40 INJECTION, SUSPENSION INTRA-ARTICULAR; INTRALESIONAL; INTRAMUSCULAR; SOFT TISSUE ONCE
Status: COMPLETED | OUTPATIENT
Start: 2021-09-23 | End: 2021-09-23

## 2021-09-23 RX ADMIN — METHYLPREDNISOLONE ACETATE 40 MG: 40 INJECTION, SUSPENSION INTRA-ARTICULAR; INTRALESIONAL; INTRAMUSCULAR; SOFT TISSUE at 11:53

## 2021-09-24 NOTE — PROGRESS NOTES
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 She has attempted conservative therapy for this condition including: rest, ice, elevation, bracing, home exercises, activity modification, NSAIDs as needed, corticosteroid injections. Physical exam:  Inspection: Both knees without erythema, ecchymosis, discoloration, abrasion, contusions, deformity or signs of infection. Palpation: There is medial tenderness to palpation to the joint line of left knee. There is patellofemoral crepitus palpable in both knees. No tenderness to palpation to any other osseous or soft tissue structures of the knee. Range of motion: Grossly intact  Neurovascular: Patient is neurovascularly intact, equally bilaterally. 2+ dorsal pedal pulses. Procedures:    After informed consent was provided, the patient was seated on the exam table with the left knee flexed to 90 degrees. The anterolateral aspect of the knee adjacent to the joint line was prepped with Chlora-prep. The skin and subcutaneous tissues were anesthetized with ethyl chloride spray. A 22-gauge needle was inserted into the left knee and 2 ml of 40 mg/ml DepoMedrol was injected. The needle was withdrawn and the puncture site sealed with a Band-Aid. The patient tolerated the procedure well. Post injection instructions and precautions  given and any problems to notify us. (Conducted by Cristy TEJADA)      Assessment:  Encounter Diagnoses   Name Primary?  Primary osteoarthritis of left knee Yes    Complex regional pain syndrome type 1 of left lower extremity     S/P ACL reconstruction      MDM:  Patient's history and physical exam as well as her current treatment course was reviewed with the patient. Previous imaging was also reviewed. Patient exhibits approximately 50% narrowing of the joint space throughout the left knee. Patient CRPS symptoms are being treated by an outside physician and have been improving. We have been utilizing conservative therapy for the arthritis in the patient's left knee. It was reiterated to the patient that with her current arthritic condition we hope to provide the patient with at least 50% relief in her symptoms utilizing corticosteroid injections. Was once again heavily stressed to the patient that given the condition in her left lower extremity her knee will not hold up to working 12-hour shifts and being on her feet as much she is with her current occupation. Treatment plan:  1. Observe postinjection precautions  2. Utilize knee brace while working  3. Rest   4. Ice 20 minutes ever 1-2 hours PRN  5. Utilize medications as prescribed  6. Activity modification  7. Lightweight exercise/low impact exercise  8. Appropriate diet/weight loss                  [place], [place]  09/24/21 8:49 AM  Physician Assistant - Certified    This dictation was performed with a verbal recognition program (DRAGON) and it was checked for errors. It is possible that there are still dictated errors within this office note. If so, please bring any errors to my attention for an addendum. All efforts were made to ensure that this office note is accurate. Supervising Physician Attestation:  I, Dr. Jahaira Alvarado, personally performed the services described in this documentation as scribed above, and it is both accurate and complete and I agree with all pertinent clinical information. I personally interviewed the patient and performed a physical examination and medical decision making. I discussed the patient's condition and treatment options and have  reviewed and agree with the past medical, family and social history unless otherwise noted. All of the patient's questions were answered.       Board Certified Orthopaedic Surgeon  [address] [facility] and [address] [place] [address]  President and Medical Director  Helder Adan  Professor of Orthopedics Clara, Letitia [address]                  This dictation was performed with a verbal recognition program (DRAGON) and it was checked for errors. It is possible that there are still dictated errors within this office note. If so, please bring any errors to my attention for an addendum. All efforts were made to ensure that this office note is accurate.

## 2021-11-12 LAB
CANDIDA SPECIES, DNA PROBE: ABNORMAL
GARDNERELLA VAGINALIS, DNA PROBE: ABNORMAL
TRICHOMONAS VAGINALIS DNA: ABNORMAL

## 2022-02-10 ENCOUNTER — HOSPITAL ENCOUNTER (OUTPATIENT)
Dept: MAMMOGRAPHY | Age: 47
Discharge: HOME | End: 2022-02-10
Payer: COMMERCIAL

## 2022-02-10 VITALS — HEIGHT: 63 IN | WEIGHT: 150 LBS | BODY MASS INDEX: 26.58 KG/M2

## 2022-02-10 DIAGNOSIS — Z12.31 BREAST CANCER SCREENING BY MAMMOGRAM: ICD-10-CM

## 2022-02-10 PROCEDURE — 77063 BREAST TOMOSYNTHESIS BI: CPT

## 2022-03-21 NOTE — TELEPHONE ENCOUNTER
Suzanne  ,  you have urinary  tract infection  with  E coli  organism , The  antibiotic   given  is  the  right  medication   for  this bacteria (you ).send a mail copy.  Follow-up as planned. Patient is wanting to know if she is going to need to be put on some kind of blood thinner/medication post operatively due to her having Factor 5 Liden. JDA please advise on if patient will need to be put on something post operatively and if so what will she be taking.

## 2022-05-04 ENCOUNTER — OFFICE VISIT (OUTPATIENT)
Dept: UROGYNECOLOGY | Age: 47
End: 2022-05-04
Payer: COMMERCIAL

## 2022-05-04 VITALS
TEMPERATURE: 98.1 F | SYSTOLIC BLOOD PRESSURE: 103 MMHG | OXYGEN SATURATION: 97 % | HEART RATE: 73 BPM | DIASTOLIC BLOOD PRESSURE: 70 MMHG

## 2022-05-04 DIAGNOSIS — R30.0 DYSURIA: ICD-10-CM

## 2022-05-04 DIAGNOSIS — N94.819 VULVODYNIA: Primary | ICD-10-CM

## 2022-05-04 DIAGNOSIS — M62.838 LEVATOR SPASM: ICD-10-CM

## 2022-05-04 PROCEDURE — 99203 OFFICE O/P NEW LOW 30 MIN: CPT | Performed by: OBSTETRICS & GYNECOLOGY

## 2022-05-04 RX ORDER — GABAPENTIN 300 MG/1
CAPSULE ORAL
COMMUNITY
Start: 2022-04-15

## 2022-05-04 RX ORDER — AMITRIPTYLINE HYDROCHLORIDE 25 MG/1
TABLET, FILM COATED ORAL
COMMUNITY
Start: 2022-02-09

## 2022-05-04 RX ORDER — FAMOTIDINE 20 MG/1
TABLET, FILM COATED ORAL
COMMUNITY
Start: 2022-01-19

## 2022-05-04 ASSESSMENT — ENCOUNTER SYMPTOMS: RECTAL PAIN: 1

## 2022-05-04 NOTE — LETTER
[address] [place] [address] Urogynecology  [facility] [facility]  Phone: [phone]  Fax: [phone]    Victorina Moreno MD    May 4, 2022     Harmony Leiva, Q[address] [address]    Patient: Merlin Paula   MR Number: [id number]   YOB: 1975   Date of Visit: 5/4/2022       Dear Harmony Leiva:    Thank you for referring Amina Benedict to me for evaluation/treatment. Below are the relevant portions of my assessment and plan of care. If you have questions, please do not hesitate to call me. I look forward to following [employer] along with you.     Sincerely,      Victorina Moreno MD

## 2022-05-04 NOTE — PROGRESS NOTES
2022      HPI:     Name: Asuncion Massey  YOB: 1975    CC: Patient is a 41 y.o. female who is seen in consultation from physical therapy  for evaluation of pelvic pain. HPI:  Bladder control problem: No   Bladder emptying problems:  No  Prolapse/Vaginal Support problems: No   Bowel problem(s): No   Sexual History:  reports being sexually active and has had partner(s) who are male. Pelvic Pain:  Yes   Where is your pain? Pelvic area, Vagina and Rectum  How long have you had pelvic pain? 8 months  Is your pain relieved by bladder emptying? No  Do you have pain with urination? No  Does anything relieve the pain?  No       Ob/Gyn History:    OB History    Para Term  AB Living   7 4 4 0 3 4   SAB IAB Ectopic Molar Multiple Live Births   3 0 0   0 4      # Outcome Date GA Lbr Parker/2nd Weight Sex Delivery Anes PTL Lv   7 Term 17 39w2d  7 lb 9.2 oz (3.435 kg) M Vag-Spont   MAREN      Birth Comments: montesinos    10 Term 08 40w0d  6 lb (2.722 kg) F Vag-Spont   MAREN   5 Term 04 40w0d  6 lb (2.722 kg) M Vag-Spont   MAREN   4 Term 00 40w0d  8 lb 7 oz (3.827 kg) M Vag-Spont   MAREN   3 SAB            2 SAB            1 SAB              Past Medical History:   Past Medical History:   Diagnosis Date    Anesthesia     HR has dropped after surgeries in the past    Anxiety     Depression     Disease of blood and blood forming organ     Liden factor V    [facility], [facility]     Hashimoto's disease     no meds    Postpartum depression     no meds    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 COLONOSCOPY N/A 11/26/2019    COLONOSCOPY POLYPECTOMY SNARE/COLD BIOPSY performed by Rae Looney MD at [address] [address] [facility]      TONSILLECTOMY      UPPER GASTROINTESTINAL ENDOSCOPY N/A 11/26/2019    EGD BIOPSY performed by Rae Looney MD at [address] [facility]     Allergies: Allergies   Allergen Reactions    Sulfa Antibiotics Rash     Current Medications:  Current Outpatient Medications   Medication Sig Dispense Refill    amitriptyline (ELAVIL) 25 MG tablet       famotidine (PEPCID) 20 MG tablet       gabapentin (NEURONTIN) 300 MG capsule       omeprazole (PRILOSEC) 20 MG delayed release capsule Take 20 mg by mouth Daily       No current facility-administered medications for this visit. Social History:   Social History     Socioeconomic History    Marital status: Single     Spouse name: Not on file    Number of children: Not on file    Years of education: Not on file    Highest education level: Not on file   Occupational History    Not on file   Tobacco Use    Smoking status: Never Smoker    Smokeless tobacco: Never Used   Vaping Use    Vaping Use: Never used   Substance and Sexual Activity    Alcohol use: Yes     Comment: socially    Drug use: No    Sexual activity: Yes     Partners: Male   Other Topics Concern    Not on file   Social History Narrative    Not on file     Social Determinants of Health     Financial Resource Strain:     Difficulty of Paying Living Expenses: Not on file   Food Insecurity:     Worried About Running Out of Food in the Last Year: Not on file    Yesenia of Food in the Last Year: Not on file   Transportation Needs:     Lack of Transportation (Medical): Not on file    Lack of Transportation (Non-Medical):  Not on file   Physical Activity:     Days of Exercise per Week: Not on file    Minutes of Exercise per Session: Not on file   Stress:     Feeling of Stress : Not on file   Social Connections:     Frequency of Communication with Friends and Family: Not on file    Frequency of Social Gatherings with Friends and Family: Not on file    Attends Confucianist Services: Not on file    Active Member of [address] [place] [address] [employer] or Organizations: Not on file    Attends Club or Organization Meetings: Not on file    Marital Status: Not on file   Intimate Partner Violence:     Fear of Current or Ex-Partner: Not on file    Emotionally Abused: Not on file    Physically Abused: Not on file    Sexually Abused: Not on file   Housing Stability:     Unable to Pay for Housing in the Last Year: Not on file    Number of [place] in the Last Year: Not on file    Unstable Housing in the Last Year: Not on file     Family History:   Family History   Problem Relation Age of Onset    High Blood Pressure Father     Cancer Father     Diabetes Brother      Review of Systems:  Review of Systems   Gastrointestinal: Positive for rectal pain. Genitourinary: Positive for pelvic pain and vaginal pain. Allergic/Immunologic: Positive for environmental allergies. All other systems reviewed and are negative. Objective:     Vital Signs  Vitals:    05/04/22 0820   BP: 103/70   Pulse: 73   Temp: 98.1 °F (36.7 °C)   SpO2: 97%     Physical Exam  Physical Exam  HENT:      Head: Normocephalic and atraumatic. Eyes:      Conjunctiva/sclera: Conjunctivae normal.   Pulmonary:      Effort: Pulmonary effort is normal.   Abdominal:      Palpations: Abdomen is soft. Genitourinary:     Comments: Q-tip test positive for vulvodynia around the posterior fourchette and clitoral area, pelvic floor muscles tender to touch  Musculoskeletal:      Cervical back: Normal range of motion and neck supple. Skin:     General: Skin is warm and dry. Neurological:      Mental Status: She is alert and oriented to person, place, and time.              Office Fill Study/Urine Dip: Using sterile technique a manometry catheter was placed. Patient's bladder was filled with sterile water by gravity. Capacity and storage volumes were measured. Spasms assessed. Catheter was removed. Stress urinary incontinence was assessed. No results found for this visit on 05/04/22. Assessment/Plan:     Stevie Mathis is a 41 y.o. female with   1. Vulvodynia    2. Dysuria    3. Levator spasm    Old records reviewed, outside records reviewed    [place] presented today with an unfortunate history of irritation and burning around the clitoral area. Of note she had an ACL repair done and then had regional pain syndrome afterwards and is currently on Neurontin as well as Elavil. She developed several months ago what she thought was irritation around the clitoral area. She describes it as burning and irritation. She has been to see pelvic floor physical therapy as well as her gynecologist Dr. Asia Hernandez. She has been given multiple creams because she thought she may have a yeast infection. She also saw Dr. Arvilla Krabbe at the [facility]. On my examination today it is consistent with vulvodynia. She is already currently on Neurontin and Elavil but I did have her start instant [place] salt baths and we gave her a diet. We also talked about limiting irritation to the area as well as not doing strengthening exercises to the pelvic floor. She will follow-up with us in approximately 1 month's time. No orders of the defined types were placed in this encounter. No orders of the defined types were placed in this encounter.       Tedra Apley, MD

## 2022-05-25 ENCOUNTER — OFFICE VISIT (OUTPATIENT)
Dept: UROGYNECOLOGY | Age: 47
End: 2022-05-25
Payer: COMMERCIAL

## 2022-05-25 ENCOUNTER — TELEPHONE (OUTPATIENT)
Dept: UROGYNECOLOGY | Age: 47
End: 2022-05-25

## 2022-05-25 VITALS
TEMPERATURE: 98 F | OXYGEN SATURATION: 98 % | SYSTOLIC BLOOD PRESSURE: 122 MMHG | DIASTOLIC BLOOD PRESSURE: 73 MMHG | RESPIRATION RATE: 14 BRPM | HEART RATE: 82 BPM

## 2022-05-25 DIAGNOSIS — N94.819 VULVODYNIA: Primary | ICD-10-CM

## 2022-05-25 PROCEDURE — 99213 OFFICE O/P EST LOW 20 MIN: CPT | Performed by: NURSE PRACTITIONER

## 2022-05-25 RX ORDER — ALPRAZOLAM 0.25 MG/1
TABLET ORAL
COMMUNITY
Start: 2022-05-11

## 2022-05-25 RX ORDER — FLUCONAZOLE 100 MG/1
TABLET ORAL
COMMUNITY
Start: 2022-05-11

## 2022-05-25 RX ORDER — HYDROXYZINE HYDROCHLORIDE 25 MG/1
TABLET, FILM COATED ORAL
COMMUNITY
Start: 2022-05-11

## 2022-05-25 NOTE — PROGRESS NOTES
LIGAMENT REPAIR Left 6/4/2020    ARTHROSCOPIC LEFT KNEE MEDIAL MENISCU REPARI AND ANTERIOR CRUCIATE LIGAMENT BONE PATELLA TENDON BONE RECONSTRUCTION - (REGIONAL BLOCK) performed by Gulshan Malik MD at [address] [facility] COLONOSCOPY N/A 11/26/2019    COLONOSCOPY POLYPECTOMY SNARE/COLD BIOPSY performed by Dante Gutierrez MD at [place] [address]      UPPER GASTROINTESTINAL ENDOSCOPY N/A 11/26/2019    EGD BIOPSY performed by Dante Gutierrez MD at [address] [place] [address]     Allergies: Allergies   Allergen Reactions    Sulfa Antibiotics Rash     Current Medications:  Current Outpatient Medications   Medication Sig Dispense Refill    ALPRAZolam (XANAX) 0.25 MG tablet TAKE 1 TABLET BY MOUTH EVERY DAY AS NEEDED FOR ANXIETY      hydrOXYzine (ATARAX) 25 MG tablet       amitriptyline (ELAVIL) 25 MG tablet       famotidine (PEPCID) 20 MG tablet       gabapentin (NEURONTIN) 300 MG capsule       omeprazole (PRILOSEC) 20 MG delayed release capsule Take 20 mg by mouth Daily      fluconazole (DIFLUCAN) 100 MG tablet TAKE 1 TABLET BY MOUTH EVERY DAY (Patient not taking: Reported on 5/25/2022)       No current facility-administered medications for this visit.      Social History:   Social History     Socioeconomic History    Marital status: Single     Spouse name: Not on file    Number of children: Not on file    Years of education: Not on file    Highest education level: Not on file   Occupational History    Not on file   Tobacco Use    Smoking status: Never Smoker    Smokeless tobacco: Never Used   Vaping Use    Vaping Use: Never used   Substance and Sexual Activity    Alcohol use: Yes     Comment: socially    Drug use: No    Sexual activity: Yes     Partners: Male   Other Topics Concern    Not on file   Social History Narrative    Not on file     Social Determinants of Health     Financial Resource Strain:     Difficulty of Paying Living Expenses: Not on file   Food Insecurity:     Worried About Running Out of Food in the Last Year: Not on file    Yesenia of Food in the Last Year: Not on file   Transportation Needs:     Lack of Transportation (Medical): Not on file    Lack of Transportation (Non-Medical): Not on file   Physical Activity:     Days of Exercise per Week: Not on file    Minutes of Exercise per Session: Not on file   Stress:     Feeling of Stress : Not on file   Social Connections:     Frequency of Communication with Friends and Family: Not on file    Frequency of Social Gatherings with Friends and Family: Not on file    Attends Zoroastrian Services: Not on file    Active Member of [address] [place] [address] [employer] or Organizations: Not on file    Attends Club or Organization Meetings: Not on file    Marital Status: Not on file   Intimate Partner Violence:     Fear of Current or Ex-Partner: Not on file    Emotionally Abused: Not on file    Physically Abused: Not on file    Sexually Abused: Not on file   Housing Stability:     Unable to Pay for Housing in the Last Year: Not on file    Number of [place] in the Last Year: Not on file    Unstable Housing in the Last Year: Not on file     Family History:   Family History   Problem Relation Age of Onset    High Blood Pressure Father     Cancer Father     Diabetes Brother          Objective:     Vitals  Vitals:    05/25/22 1018   BP: 122/73   Pulse: 82   Resp: 14   Temp: 98 °F (36.7 °C)   SpO2: 98%     Physical Exam  Physical Exam  Vitals and nursing note reviewed. Constitutional:       Appearance: Normal appearance. HENT:      Head: Normocephalic. Eyes:      Conjunctiva/sclera: Conjunctivae normal.   Cardiovascular:      Rate and Rhythm: Normal rate. Pulmonary:      Effort: Pulmonary effort is normal.   Musculoskeletal:         General: Normal range of motion. Cervical back: Normal range of motion.    Skin:     General: Skin is warm and dry. Neurological:      General: No focal deficit present. Mental Status: She is alert. Psychiatric:         Mood and Affect: Mood normal.         Behavior: Behavior normal.         No results found for this visit on 05/25/22. Assessment/Plan:     Osmin Almanza is a 41 y.o. female with   1. Vulvodynia      -Vulvodynia:  Unstable and no improvement with addition of ocean sea salt sitz bath and behavioral changes. She will continue PFPT, Elavil and Neurontin as prescribed by previous provider. At this time I will add nightly Vaginal Valium suppositories 10 mg (order faxed to [facility]) and have asked her to see Dr. Dar Fuentes. Sandie Purcell, APRN - CNP    No orders of the defined types were placed in this encounter. No orders of the defined types were placed in this encounter.       Sandie Purcell, APRN - CNP

## 2022-05-25 NOTE — TELEPHONE ENCOUNTER
Pt was seen today and Ken Rich referred her to another Dr. She was wondering if there was someone else she will refer because that Dr Carrie Barahona get her in till Feb.

## 2022-05-25 NOTE — TELEPHONE ENCOUNTER
Called patient to notify her that per Raymond Rojas NP she would like her to make appointment and try to get on the wait list for office if possible. Patient confirmed that she did. In the mean time Raymond Rojas NP would like her to try the Valium vaginal suppositories and follow up with Dr. Imani Ellis in 6-8 weeks. Patient agreed and appointment was made.

## 2022-06-09 ENCOUNTER — OFFICE VISIT (OUTPATIENT)
Dept: ORTHOPEDIC SURGERY | Age: 47
End: 2022-06-09
Payer: COMMERCIAL

## 2022-06-09 VITALS — WEIGHT: 150 LBS | HEIGHT: 63 IN | BODY MASS INDEX: 26.58 KG/M2

## 2022-06-09 DIAGNOSIS — G89.29 CHRONIC PAIN OF LEFT KNEE: ICD-10-CM

## 2022-06-09 DIAGNOSIS — Z98.890 S/P ACL RECONSTRUCTION: ICD-10-CM

## 2022-06-09 DIAGNOSIS — M17.11 PRIMARY OSTEOARTHRITIS OF RIGHT KNEE: ICD-10-CM

## 2022-06-09 DIAGNOSIS — G90.522 COMPLEX REGIONAL PAIN SYNDROME TYPE 1 OF LEFT LOWER EXTREMITY: ICD-10-CM

## 2022-06-09 DIAGNOSIS — M25.562 CHRONIC PAIN OF LEFT KNEE: ICD-10-CM

## 2022-06-09 DIAGNOSIS — M17.12 PATELLOFEMORAL ARTHRITIS OF LEFT KNEE: Primary | ICD-10-CM

## 2022-06-09 PROCEDURE — 99213 OFFICE O/P EST LOW 20 MIN: CPT | Performed by: ORTHOPAEDIC SURGERY

## 2022-06-09 PROCEDURE — 20610 DRAIN/INJ JOINT/BURSA W/O US: CPT | Performed by: ORTHOPAEDIC SURGERY

## 2022-06-09 RX ORDER — METHYLPREDNISOLONE ACETATE 40 MG/ML
40 INJECTION, SUSPENSION INTRA-ARTICULAR; INTRALESIONAL; INTRAMUSCULAR; SOFT TISSUE ONCE
Status: COMPLETED | OUTPATIENT
Start: 2022-06-09 | End: 2022-06-09

## 2022-06-09 RX ADMIN — METHYLPREDNISOLONE ACETATE 40 MG: 40 INJECTION, SUSPENSION INTRA-ARTICULAR; INTRALESIONAL; INTRAMUSCULAR; SOFT TISSUE at 11:36

## 2022-06-09 NOTE — PROGRESS NOTES
[facility]  History and Physical      Date:  2022    Name:  Arsen Campos  Address:  [address] [place] [address]. [id number]    :  1975      Age:   41 y.o. Chief Complaint    Knee Pain (magen knee pain)      History of Present Illness:  Arsen Campos is a 41 y.o. female who presents for follow-up evaluation of her bilateral knee pain. This patient is well-known to Fatou Arenas and is being treated with conservative therapy for the patellofemoral arthritis in her left knee and left lower extremity CRPS. To briefly recap this patient sustained an injury in  playing soccer with her kids. She had an ACL tear and a subsequent reconstruction by Dr. Cande Burton. She began experiencing hypersensitivity in the left lower extremity was seen by vascular she was diagnosed with CRPS. She has been seen by pain management and received several lumbar sympathetic nerve blocks. She is currently taking gabapentin. The patient notes that her hypersensitivity has significantly improved. 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 She notes she is on her feet for most of the shift and pushes through the pain. She has noticed increased pain in the last several weeks without any associated injury or change in lifestyle. She has noticed pain in the right knee as she has been compensating with her gait. She describes the pain in her right knee as persistent, sharp and stabbing through the joint line. Pain in both knees is worse with prolonged standing or ambulating. The patient denies any new injury. The patient denies any catching, giving way, joint locking, numbness, paresthesias, or weakness. History from the patient's last visit on 2021:  Kiki Byrne is a 42 y.o. female who presents for follow-up evaluation of her left knee pain.   This patient is being treated with conservative therapy for left lower extremity CRPS status post ACL reconstruction with patellofemoral osteoarthritis. Patient has been receiving lumbar sympathetic blocks and feels that her CRPS is improving however she notes that the sympathetic blocks have recently not provide her with any additional relief. She is no longer taking gabapentin but is still on Cymbalta and Lyrica. She now states she feels dull achy pain within the medial joint line of the left knee with no new associated injury. She has felt this pain before and is attributed to osteoarthritis within the joint. She states the pain is persistent dull and achy with sharp intermittent bouts. She rates it a 4/10 and states is worse with prolonged activity particularly when she is on her feet for long durations of time. 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 She has attempted conservative therapy for this condition including: rest, ice, elevation, bracing, home exercises, activity modification, NSAIDs as needed, corticosteroid injections. The patient denies any new injury. The patient denies any giving way, joint locking, numbness, paresthesias, or weakness.       Pain Assessment  Location of Pain: Knee  Location Modifiers: Right,Left  Severity of Pain: 6  Quality of Pain: Sharp,Dull,Aching,Grinding,Popping,Buckling  Duration of Pain: Persistent  Frequency of Pain: Constant  Limiting Behavior: Yes  Relieving Factors: Nsaids,Rest  Result of Injury: No  Work-Related Injury: No  Are there other pain locations you wish to document?: No    Past Medical History:   Diagnosis Date    Anesthesia     HR has dropped after surgeries in the past    Anxiety     Depression     Disease of blood and blood forming organ     Liden factor V    Factor [facility] Le[facility] mutation, [facility]     Hashimoto's disease     no meds    Postpartum depression     no meds    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, not pregnant     Thyroid disease     Auto-immune Hashimotos- no meds        Past Surgical History:   Procedure Laterality Date    ANTERIOR CRUCIATE LIGAMENT REPAIR Left 6/4/2020    ARTHROSCOPIC LEFT KNEE MEDIAL MENISCU REPARI AND ANTERIOR CRUCIATE LIGAMENT BONE PATELLA TENDON BONE RECONSTRUCTION - (REGIONAL BLOCK) performed by Roberto Banks MD at [facility]      COLONOSCOPY N/A 11/26/2019    COLONOSCOPY POLYPECTOMY SNARE/COLD BIOPSY performed by Aliica Mary MD at [address] [facility] AND CURETTAGE OF UTERUS      TONSILLECTOMY      UPPER GASTROINTESTINAL ENDOSCOPY N/A 11/26/2019    EGD BIOPSY performed by Alicia Mary MD at [facility]       Family History   Problem Relation Age of Onset    High Blood Pressure Father     Cancer Father     Diabetes Brother        Social History     Socioeconomic History    Marital status: Single     Spouse name: None    Number of children: None    Years of education: None    Highest education level: None   Occupational History    None   Tobacco Use    Smoking status: Never Smoker    Smokeless tobacco: Never Used   Vaping Use    Vaping Use: Never used   Substance and Sexual Activity    Alcohol use: Yes     Comment: socially    Drug use: No    Sexual activity: Yes     Partners: Male   Other Topics Concern    None   Social History Narrative    None     Social Determinants of Health     Financial Resource Strain:     Difficulty of Paying Living Expenses: Not on file   Food Insecurity:     Worried About Running Out of Food in the Last Year: Not on file    Yesenia of Food in the Last Year: Not on file   Transportation Needs:     Lack of Transportation (Medical): Not on file    Lack of Transportation (Non-Medical):  Not on file   Physical Activity:     Days of Exercise per Week: Not on file    Minutes of Exercise per Session: Not on file   Stress:     Feeling of Stress : Not on file Social Connections:     Frequency of Communication with Friends and Family: Not on file    Frequency of Social Gatherings with Friends and Family: Not on file    Attends Yarsani Services: Not on file    Active Member of Clubs or Organizations: Not on file    Attends Club or Organization Meetings: Not on file    Marital Status: Not on file   Intimate Partner Violence:     Fear of Current or Ex-Partner: Not on file    Emotionally Abused: Not on file    Physically Abused: Not on file    Sexually Abused: Not on file   Housing Stability:     Unable to Pay for Housing in the Last Year: Not on file    Number of [place] in the Last Year: Not on file    Unstable Housing in the Last Year: Not on file       Current Outpatient Medications   Medication Sig Dispense Refill    ALPRAZolam (XANAX) 0.25 MG tablet TAKE 1 TABLET BY MOUTH EVERY DAY AS NEEDED FOR ANXIETY      fluconazole (DIFLUCAN) 100 MG tablet TAKE 1 TABLET BY MOUTH EVERY DAY (Patient not taking: Reported on 5/25/2022)      hydrOXYzine (ATARAX) 25 MG tablet       amitriptyline (ELAVIL) 25 MG tablet       famotidine (PEPCID) 20 MG tablet       gabapentin (NEURONTIN) 300 MG capsule       omeprazole (PRILOSEC) 20 MG delayed release capsule Take 20 mg by mouth Daily       No current facility-administered medications for this visit. Allergies   Allergen Reactions    Sulfa Antibiotics Rash         Review of Systems:  A 14 point review of systems was completed by the patient and is available in the media section of the scanned medical record and was reviewed. Vital Signs:   Ht 5' 3\" (1.6 m)   Wt 150 lb (68 kg)   BMI 26.57 kg/m²     General Exam:    General: John Ramirez is a healthy and well appearing 41y.o. year old female who is sitting comfortably in our office in no acute distress. Neuro: Alert & Oriented x 3,  normal,  no focal deficits noted. Normal mood & affect.   Eyes: Sclera clear  Ears: Normal external ear  Mouth: Patient wearing a mask  Pulm: Respirations unlabored and regular   Skin: Warm, well perfused      Knee Examination: Left    Inspection: Well-healed scars from prior surgery. No effusion, ecchymosis, discoloration, erythema, excessive warmth. no gross deformities, no signs of infection. Palpation: There is severe patellofemoral crepitus, there is no joint line tenderness. No other osseous or soft tissue tenderness around the knee. Negative calf tenderness    Range of Motion:  0-135 degrees without pain or difficulty    Strength: Grossly intact    Special Tests: No ligament instability, valgus and varus stress test are stable at 0 and 30°. Lachman's exhibits a solid endpoint. Negative posterior drawer. Negative Homans sign    Gait: Non antalgic, without the use of assistive devices    Alignment: Neutral    Neuro: Sensation equal bilateral lower extremities    Vascular: 2+ posterior tibialis pulse      Contralateral Knee Comparison Examination: Right    Inspection:  No effusion, ecchymosis, discoloration, erythema, excessive warmth. no gross deformities, no signs of infection. Palpation: There is patellofemoral crepitus, there is no joint line tenderness. No other osseous or soft tissue tenderness around the knee. Negative calf tenderness    Range of Motion:  0-135 degrees without pain or difficulty    Strength: Grossly intact    Special Tests: No ligament instability, valgus and varus stress test are stable at 0 and 30°. Lachman's exhibits a solid endpoint. Negative posterior drawer. Negative Homans sign    Gait: Non antalgic, without the use of assistive devices    Alignment: Neutral    Neuro: Sensation equal bilateral lower extremities    Vascular: 2+ posterior tibialis pulse      Radiology:   Previously obtain imaging reviewed. X-rays obtained and reviewed in office: AP and merchant view of both knees and a lateral of the left. Impression: X-ray imaging reversed right is actually the left. reviewed with the patient. Given the patient's history and physical exam I believe with a reasonable degree of medical certainty that the patient can still utilize some conservative treatment for the patellofemoral arthritis in her left knee. This conservative treatment was reviewed with the patient as detailed below. At some point when conservative management fails she may be a candidate for a patellofemoral unicompartmental knee replacement though he would like to put this off as long as possible. We will pre-CERT viscosupplementation injections as she is getting less relief with cortisone. All the patient's questions were answered while in the clinic. The patient is understanding of all instructions and agrees with the plan. Treatment Plan:    1. Observe postinjection precautions  2. Pre-CERT viscosupplementation  3. Decrease ambulating at work when possible  4. Activity modification/Rest   5. Ice 20 minutes ever 1-2 hours PRN  6. Take medications as prescribed/instructed  7. Elevation   8. Lightweight exercise/low impact exercise  9. Appropriate diet/weight management      Follow up: As needed    This patient exhibits moderate complexity for obtaining an independent history, reviewing past medical records, independent interpretation/review of diagnostic imaging, and further coordinating care. The patient exhibits low risk given that the patient is being treated with conservative therapy. Approximately 30 minutes were spent reviewing past medical records, imaging, educating the patient, and coordinating care. Trina Lundberg PA-C    Physician Assistant - Certified    73/27/08 9:40 AM    During this examination, I, Hanane Stoll, functioned as a scribe for Dr. Iain Newell. This dictation was performed with a verbal recognition program (DRAGON) and it was checked for errors. It is possible that there are still dictated errors within this office note.   If so, please bring any errors to my attention for an addendum. All efforts were made to ensure that this office note is accurate. This dictation was performed with a verbal recognition program (DRAGON) and it was checked for errors. It is possible that there are still dictated errors within this office note. If so, please bring any errors to my attention for an addendum. All efforts were made to ensure that this office note is accurate. Supervising Physician Attestation:  I, Dr. Kylee Frederick, personally performed the services described in this documentation as scribed above, and it is both accurate and complete and I agree with all pertinent clinical information. I personally interviewed the patient and performed a physical examination and medical decision making. I discussed the patient's condition and treatment options and have  reviewed and agree with the past medical, family and social history unless otherwise noted. All of the patient's questions were answered.       Board Certified Orthopaedic Surgeon  [facility] and [address] and [address] and [employer]  Professor of [address]

## 2022-06-10 RX ORDER — METHYLPREDNISOLONE ACETATE 40 MG/ML
40 INJECTION, SUSPENSION INTRA-ARTICULAR; INTRALESIONAL; INTRAMUSCULAR; SOFT TISSUE ONCE
Status: DISCONTINUED | OUTPATIENT
Start: 2022-06-10 | End: 2022-06-10

## 2022-06-27 ENCOUNTER — TELEPHONE (OUTPATIENT)
Dept: ORTHOPEDIC SURGERY | Age: 47
End: 2022-06-27

## 2022-06-27 DIAGNOSIS — M17.12 PRIMARY OSTEOARTHRITIS OF LEFT KNEE: Primary | ICD-10-CM

## 2022-07-19 ENCOUNTER — TELEPHONE (OUTPATIENT)
Dept: UROGYNECOLOGY | Age: 47
End: 2022-07-19

## 2022-07-19 NOTE — TELEPHONE ENCOUNTER
Spoke with patient on the phone, she is requesting refill on valium vaginal suppositories 10 mg. Refill sent to [facility]. Patient made aware. No other concerns at this time.

## 2022-07-28 ENCOUNTER — OFFICE VISIT (OUTPATIENT)
Dept: ORTHOPEDIC SURGERY | Age: 47
End: 2022-07-28
Payer: COMMERCIAL

## 2022-07-28 VITALS — WEIGHT: 150 LBS | HEIGHT: 63 IN | BODY MASS INDEX: 26.58 KG/M2

## 2022-07-28 DIAGNOSIS — M17.12 PATELLOFEMORAL ARTHRITIS OF LEFT KNEE: Primary | ICD-10-CM

## 2022-07-28 DIAGNOSIS — M25.562 CHRONIC PAIN OF LEFT KNEE: ICD-10-CM

## 2022-07-28 DIAGNOSIS — G89.29 CHRONIC PAIN OF LEFT KNEE: ICD-10-CM

## 2022-07-28 PROCEDURE — 99999 PR OFFICE/OUTPT VISIT,PROCEDURE ONLY: CPT | Performed by: PHYSICIAN ASSISTANT

## 2022-07-28 PROCEDURE — 20610 DRAIN/INJ JOINT/BURSA W/O US: CPT | Performed by: PHYSICIAN ASSISTANT

## 2022-07-28 RX ORDER — LIDOCAINE HYDROCHLORIDE 20 MG/ML
1.5 INJECTION, SOLUTION INFILTRATION; PERINEURAL ONCE
Status: COMPLETED | OUTPATIENT
Start: 2022-07-28 | End: 2022-07-28

## 2022-07-28 RX ADMIN — LIDOCAINE HYDROCHLORIDE 1.5 ML: 20 INJECTION, SOLUTION INFILTRATION; PERINEURAL at 11:57

## 2022-08-02 NOTE — PROGRESS NOTES
Chief Complaint    Knee Pain (Left knee pain/ gel inj)      History of Present Illness:  Jaleel Roberts is a 41 y.o. female who presents for a viscosupplementation injection in the left knee. This patient well-known to Fatou Arenas and is being treated with conservative therapy for the patellofemoral arthritis in her left knee and left lower extremity CRPS. To briefly recap this patient sustained an injury in 2021 playing soccer with her kids. She had an ACL tear and a subsequent reconstruction by Dr. Tejinder Zepeda. She began experiencing hypersensitivity in the left lower extremity was seen by vascular she was diagnosed with CRPS. She has been seen by pain management and received several lumbar sympathetic nerve blocks. She is currently taking gabapentin. The patient notes that her hypersensitivity has significantly improved. Of note the patient is now noting increased right knee pain due to compensation. Pain is localized through the joint line. She notes achy as well as intermittent sharp pain. She currently denies any mechanical symptoms or sensations of instability. The patient denies any new injury. The patient denies any numbness, paresthesias, or weakness. History from the patient's last visit on 06/09/2022. Jaleel Roberts is a 41 y.o. female who presents for follow-up evaluation of her bilateral knee pain. This patient is well-known to Fatou Arenas and is being treated with conservative therapy for the patellofemoral arthritis in her left knee and left lower extremity CRPS. To briefly recap this patient sustained an injury in 2021 playing soccer with her kids. She had an ACL tear and a subsequent reconstruction by Dr. Tejinder Zepeda. She began experiencing hypersensitivity in the left lower extremity was seen by vascular she was diagnosed with CRPS. She has been seen by pain management and received several lumbar sympathetic nerve blocks. She is currently taking gabapentin.   The patient notes that her hypersensitivity has significantly improved. 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 She notes she is on her feet for most of the shift and pushes through the pain. She has noticed increased pain in the last several weeks without any associated injury or change in lifestyle. She has noticed pain in the right knee as she has been compensating with her gait. She describes the pain in her right knee as persistent, sharp and stabbing through the joint line. Pain in both knees is worse with prolonged standing or ambulating. The patient denies any new injury. The patient denies any catching, giving way, joint locking, numbness, paresthesias, or weakness. Pain Assessment  Location of Pain: Knee  Location Modifiers: Left  Severity of Pain: 8  Quality of Pain: Sharp, Dull, Aching  Duration of Pain: A few minutes  Frequency of Pain: Intermittent  Aggravating Factors: Stairs, Walking, Standing, Squatting, Bending  Limiting Behavior: Some  Result of Injury: No  Work-Related Injury: No  Are there other pain locations you wish to document?: No      Physical exam:  Inspection: Both knees without erythema, ecchymosis, discoloration, abrasion, contusions, deformity or signs of infection. Palpation: There is tenderness to palpation to the joint line of both knees. There is patellofemoral crepitus palpable in both knees. No tenderness to palpation to any other osseous or soft tissue structures of the knee. Range of motion: Grossly intact  Neurovascular: Patient is neurovascularly intact, equally bilaterally. 2+ dorsal pedal pulses. Procedures:    VISCO SUPPLEMENTATION  INJECTION:  Left  KNEE    PROCEDURE: Risks, benefits, and alternatives to the injections were discussed in detail with the patient. The risks discussed included but are not limited to infection, skin reactions, hot swollen joints, and anaphylaxis.      After informed consent was provided, the

## 2022-08-22 ENCOUNTER — TELEPHONE (OUTPATIENT)
Dept: UROGYNECOLOGY | Age: 47
End: 2022-08-22

## 2022-08-30 ENCOUNTER — OFFICE VISIT (OUTPATIENT)
Dept: UROGYNECOLOGY | Age: 47
End: 2022-08-30
Payer: COMMERCIAL

## 2022-08-30 VITALS
OXYGEN SATURATION: 98 % | DIASTOLIC BLOOD PRESSURE: 84 MMHG | TEMPERATURE: 98 F | SYSTOLIC BLOOD PRESSURE: 119 MMHG | HEART RATE: 78 BPM | RESPIRATION RATE: 14 BRPM

## 2022-08-30 DIAGNOSIS — M62.838 LEVATOR SPASM: ICD-10-CM

## 2022-08-30 DIAGNOSIS — N94.819 VULVODYNIA: Primary | ICD-10-CM

## 2022-08-30 DIAGNOSIS — R30.0 DYSURIA: ICD-10-CM

## 2022-08-30 PROCEDURE — 99213 OFFICE O/P EST LOW 20 MIN: CPT | Performed by: OBSTETRICS & GYNECOLOGY

## 2022-08-30 NOTE — PROGRESS NOTES
2022       HPI:     Name: Mary Lemus  YOB: 1975    CC: Patient is a 41 y.o. presenting for evaluation of  vulvo . HPI: How long have you had this problem? vulvodynia  Please rate the severity of your problem: mild  Anything make it better? She is doing much better today.  Symptoms have been controlled well since     Ob/Gyn History:    OB History    Para Term  AB Living   7 4 4 0 3 4   SAB IAB Ectopic Molar Multiple Live Births   3 0 0   0 4      # Outcome Date GA Lbr Parker/2nd Weight Sex Delivery Anes PTL Lv   7 Term 17 39w2d  7 lb 9.2 oz (3.435 kg) M Vag-Spont   MAREN      Birth Comments: montesinos    10 Term 08 40w0d  6 lb (2.722 kg) F Vag-Spont   MAREN   5 Term 04 40w0d  6 lb (2.722 kg) M Vag-Spont   MAREN   4 Term 00 40w0d  8 lb 7 oz (3.827 kg) M Vag-Spont   MAREN   3 SAB            2 SAB            1 SAB              Past Medical History:   Past Medical History:   Diagnosis Date    Anesthesia     HR has dropped after surgeries in the past    Anxiety     Depression     Disease of blood and blood forming organ     Liden factor V    Factor [facility] Le[facility]     Hashimoto's disease     no meds    Postpartum depression     no meds    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, not pregnant     Thyroid disease     Auto-immune Hashimotos- no meds     Past Surgical History:   Past Surgical History:   Procedure Laterality Date    ANTERIOR CRUCIATE LIGAMENT REPAIR Left 2020    ARTHROSCOPIC LEFT KNEE MEDIAL MENISCU REPARI AND ANTERIOR CRUCIATE LIGAMENT BONE PATELLA TENDON BONE RECONSTRUCTION - (REGIONAL BLOCK) performed by Rob Frances MD at [address]      COLONOSCOPY N/A 2019    COLONOSCOPY POLYPECTOMY SNARE/COLD BIOPSY performed by Janet Torres MD at [address] [facility] [address] ENDOSCOPY N/A 11/26/2019    EGD BIOPSY performed by Harihs Morales MD at [facility]     Allergies: Allergies   Allergen Reactions    Sulfa Antibiotics Rash     Current Medications:  Current Outpatient Medications   Medication Sig Dispense Refill    ALPRAZolam (XANAX) 0.25 MG tablet TAKE 1 TABLET BY MOUTH EVERY DAY AS NEEDED FOR ANXIETY      fluconazole (DIFLUCAN) 100 MG tablet TAKE 1 TABLET BY MOUTH EVERY DAY      hydrOXYzine (ATARAX) 25 MG tablet       amitriptyline (ELAVIL) 25 MG tablet       famotidine (PEPCID) 20 MG tablet       gabapentin (NEURONTIN) 300 MG capsule       omeprazole (PRILOSEC) 20 MG delayed release capsule Take 20 mg by mouth Daily       No current facility-administered medications for this visit.      Social History:   Social History     Socioeconomic History    Marital status: Single     Spouse name: Not on file    Number of children: Not on file    Years of education: Not on file    Highest education level: Not on file   Occupational History    Not on file   Tobacco Use    Smoking status: Never    Smokeless tobacco: Never   Vaping Use    Vaping Use: Never used   Substance and Sexual Activity    Alcohol use: Yes     Comment: socially    Drug use: No    Sexual activity: Yes     Partners: Male   Other Topics Concern    Not on file   Social History Narrative    Not on file     Social Determinants of Health     Financial Resource Strain: Not on file   Food Insecurity: Not on file   Transportation Needs: Not on file   Physical Activity: Not on file   Stress: Not on file   Social Connections: Not on file   Intimate Partner Violence: Not on file   Housing Stability: Not on file     Family History:   Family History   Problem Relation Age of Onset    High Blood Pressure Father     Cancer Father     Diabetes Brother          Objective:     Vitals  Vitals:    08/30/22 1017   BP: 119/84   Pulse: 78   Resp: 14   Temp: 98 °F (36.7 °C)   SpO2: 98%     Physical Exam  Physical Exam  HENT:      Head: Normocephalic and atraumatic. Eyes:      Conjunctiva/sclera: Conjunctivae normal.   Pulmonary:      Effort: Pulmonary effort is normal.   Abdominal:      Palpations: Abdomen is soft. Musculoskeletal:      Cervical back: Normal range of motion and neck supple. Skin:     General: Skin is warm and dry. Neurological:      Mental Status: She is alert and oriented to person, place, and time. No results found for this visit on 08/30/22. Assessment/Plan:     Daniel Schultz is a 41 y.o. female with   1. Vulvodynia    2. Dysuria    3. Levator spasm    Old records reviewed, outside records reviewed    Batsheva Wheat is doing much better and is approximately 90% improved overall. She currently takes Elavil for a GI issue but I told her that she could begin to stop that if her pelvic floor continues to do well. She will continue to use the Valium suppositories as needed. She does have a follow-up appointment with Dr. Martha Alegria but this is not until February. No orders of the defined types were placed in this encounter. No orders of the defined types were placed in this encounter.       Libertad Soto MD

## 2022-09-09 ENCOUNTER — TELEPHONE (OUTPATIENT)
Dept: UROGYNECOLOGY | Age: 47
End: 2022-09-09

## 2022-09-09 NOTE — TELEPHONE ENCOUNTER
Patient has been waiting for her refill. Needs a refill for vaginal valium. Please advise her when available.  Thank you

## 2022-11-04 NOTE — FLOWSHEET NOTE
level Sets/sec Reps Notes / Cues   Bike (seat position 4) ROM 5'     IB - Calf stretch  30\"  5    Long Sitting Hamstring stretch  30\" 5    Prone quad stretch  30\" 3 Added 7/27   ERMI   20\" 5    SAQ Man. Assist w/ extension and eccentric lower Wanda from PT to attain full range extension for first set. SAQ to SLR supine       SLR       sidelying hip abduction  2 10 Added 7/13   bridge  5\" 10 Added 7/24   Standing HR   2 10 Added 7/17   Standing HS curls 3# 2 10 ^7/24   LAQ (90-30) 3# 2 10 ^7/24   Leg press (90-10) 60# 2 10 Start 7/24   Modified wall sit ~70deg 20\" 2 Start 7/24          Therapeutic Activities (44627) 3'       Mini squats  2 10 Added 7/17 tactile cueing and quad facilitation       Added 7/17 verbal and tactile cueing for heel/toe pattern, appropriate knee bend, quad activation with stance    Verbal and tactile cueing for heel IC and toe push off with quad activation for TKE during L LE stance phase           Neuromuscular Re-ed (64134) 16'       For quadriceps atrophy and re-education in order to improve quad function and eccentric control which will improve function and QOL. Quad Set Into towel roll  5\" 5       SLR - flexion (semi-reclined)  3 5 Manual assist for initiation, cueing throughout to decrease extensor lag                               Manual Intervention (86604) x4'       Manual OP into extension  4'                       Pt. Education:  -all pt questions were answered    Access Code: [email or web address]   URL: [employer]/   Date: 07/15/2020   Prepared by: Hannah Flores     Exercises   Standing Knee Flexion - 10 reps - 2 sets - 2x daily - 7x weekly   Sidelying Hip Abduction - 10 reps - 2 sets - 2x daily - 7x weekly   Prone Hip Extension - 10 reps - 2 sets - 2x daily - 7x weekly   Seated Long Arc Quad - 10 reps - 2 sets - 2x daily - 7x weekly       Home Exercise Program:  Access Code: [email or web address]   URL: [employer]/   Date: 07/09/2020   Prepared by: Joaquim Soulier Exercises   Supine Ankle Pumps - 10 reps - 3 sets - 3-5x daily - 7x weekly   Long Sitting Calf Stretch with Strap - 10 reps - 3 sets - 3-5x daily - 7x weekly   Seated Table Hamstring Stretch - 10 reps - 3 sets - 3-5x daily - 7x weekly   Supine Quad Set - 10 reps - 3 sets - 3-5x daily - 7x weekly   Supine Heel Slide with Strap - 10 reps - 3 sets - 3-5x daily - 7x weekly   Seated Knee Flexion AAROM - 10 reps - 3 sets - 3-5x daily - 7x weekly   Supine Knee Flexion AAROM at Wall - 10 reps - 3 sets - 3-5x daily - 7x weekly   Supine Knee Flexion AAROM at Wall - 10 reps - 3 sets - 3-5x daily - 7x weekly     Therapeutic Exercise and NMR EXR  [x] (52114) Provided verbal/tactile cueing for activities related to strengthening, flexibility, endurance, ROM for improvements in LE, proximal hip, and core control with self care, mobility, lifting, ambulation. [x] (76354) Provided verbal/tactile cueing for activities related to improving balance, coordination, kinesthetic sense, posture, motor skill, proprioception  to assist with LE, proximal hip, and core control in self care, mobility, lifting, ambulation and eccentric single leg control.   [] (95862) Therapist is in constant attendance of 2 or more patients providing skilled therapy interventions, but not providing any significant amount of measurable one-on-one time to either patient, for improvements in LE, proximal hip, and core control in self care, mobility, lifting, ambulation and eccentric single leg control.      NMR and Therapeutic Activities:    [x] (78309 or 22020) Provided verbal/tactile cueing for activities related to improving balance, coordination, kinesthetic sense, posture, motor skill, proprioception and motor activation to allow for proper function of core, proximal hip and LE with self care and ADLs  [] (85892) Gait Re-education- Provided training and instruction to the patient for proper LE, core and proximal hip recruitment and positioning and [Follow-Up: _____] : a [unfilled] follow-up visit eccentric body weight control with ambulation re-education including up and down stairs     Home Exercise Program:    [x] (73140) Reviewed/Progressed HEP activities related to strengthening, flexibility, endurance, ROM of core, proximal hip and LE for functional self-care, mobility, lifting and ambulation/stair navigation   [] (96395)Reviewed/Progressed HEP activities related to improving balance, coordination, kinesthetic sense, posture, motor skill, proprioception of core, proximal hip and LE for self care, mobility, lifting, and ambulation/stair navigation      Manual Treatments:  PROM / STM / Oscillations-Mobs:  G-I, II, III, IV (PA's, Inf., Post.)  [x] (14402) Provided manual therapy to mobilize LE, proximal hip and/or LS spine soft tissue/joints for the purpose of modulating pain, promoting relaxation,  increasing ROM, reducing/eliminating soft tissue swelling/inflammation/restriction, improving soft tissue extensibility and allowing for proper ROM for normal function with self care, mobility, lifting and ambulation. Modalities:  [] (41009) Vasopneumatic compression: Utilized vasopneumatic compression to decrease edema / swelling for the purpose of improving mobility and quad tone / recruitment which will allow for increased overall function including but not limited to self-care, transfers, ambulation, and ascending / descending stairs.        Modalities:       Charges:  Timed Code Treatment Minutes: 45   Total Treatment Minutes: 45     [] EVAL - LOW (30610)   [] EVAL - MOD (52455)  [] EVAL - HIGH (05036)  [] RE-EVAL (27135)  [x] HX(29032) x 2      [] Ionto  [x] NMR (80284) x 1      [] Vaso  [] Manual (93912) x       [] Ultrasound  [] TA x         [] Mech Traction (70943)  [] Aquatic Therapy x      [] ES (un) (21036):   [] Home Management Training x  [] ES(attended) (11586)   [] Dry Needling 1-2 muscles (06613):  [] Dry Needling 3+ muscles (740270  [] Group:      [] Other:     GOALS:  Patient stated goal: [Family Member] : family member improve ROM and return to normal   []? Progressing: []? Met: []? Not Met: []? Adjusted     Therapist goals for Patient:   Short Term Goals: To be achieved in: 2 weeks  1. Independent in HEP and progression per patient tolerance, in order to prevent re-injury. []? Progressing: []? Met: []? Not Met: []? Adjusted  2. Patient will have a decrease in pain to facilitate improvement in movement, function, and ADLs as indicated by Functional Deficits. []? Progressing: []? Met: []? Not Met: []? Adjusted     Long Term Goals: To be achieved in: 12 weeks  1. Disability index score of 20% or less for the LEFS to assist with reaching prior level of function. []? Progressing: []? Met: []? Not Met: []? Adjusted  2. Patient will demonstrate increased AROM to at least 0-120 to allow for proper joint functioning as indicated by patients Functional Deficits. []? Progressing: []? Met: []? Not Met: []? Adjusted  3. Patient will demonstrate an increase in Strength to at least 4+/5 quad as well as good proximal hip strength and control to allow for proper functional mobility as indicated by patients Functional Deficits. []? Progressing: []? Met: []? Not Met: []? Adjusted  4. Patient will return to functional activities including walking w/o assistive device without increased symptoms or restriction. []? Progressing: []? Met: []? Not Met: []? Adjusted  5. Patient will be able to return to work full duty without restriction. []? Progressing: []? Met: []? Not Met: []? Adjusted         Overall Progression Towards Functional goals/ Treatment Progress Update:  [] Patient is progressing as expected towards functional goals listed. [] Progression is slowed due to complexities/Impairments listed. [] Progression has been slowed due to co-morbidities.   [x] Plan just implemented, too soon to assess goals progression <30days   [] Goals require adjustment due to lack of progress  [] Patient is not progressing as expected and requires [FreeTextEntry1] : L MARCELLUS and small R brain aneurysm  additional follow up with physician  [] Other    Persisting Functional Limitations/Impairments:  [x]Sitting [x]Standing   [x]Walking [x]Stairs   [x]Transfers [x]ADLs   [x]Squatting/bending [x]Kneeling  [x]Housework [x]Job related tasks  []Driving [x]Sports/Recreation   [x]Sleeping []Other:    ASSESSMENT:  Pt continues to demonstrate poor quad tone and function throughout routine especially with SLR. Added prone quad stretch this date and discussed adding stretch to home program. Pt demonstrates pain in knee flexion on ERMI with end range motion. Increased time required to perform exercises due to slow movement and pain. Continue to push motion and strength to improve independence with gait, balance, and overall function for return to work and to perform daily household tasks without restrictions. Treatment/Activity Tolerance:  [x] Pt able to complete treatment [] Patient limited by fatique  [] Patient limited by pain  [] Patient limited by other medical complications  [] Other:     Prognosis:  [x] Good [] Fair  [] Poor    Patient Requires Follow-up: [x] Yes  [] No    Return to Play:    [x]  N/A    PLAN: See eval. PT 3x / week for 3 weeks then 2x/week for 9 weeks. [x] Continue per plan of care [] Alter current plan (see comments)  [] Plan of care initiated [] Hold pending MD visit [] Discharge    Electronically signed by: Edna Berumen       Note: If patient does not return for scheduled/ recommended follow up visits, this note will serve as a discharge from care along with most recent update on progress.

## 2022-11-23 ENCOUNTER — TELEPHONE (OUTPATIENT)
Dept: UROGYNECOLOGY | Age: 47
End: 2022-11-23

## 2022-12-06 ENCOUNTER — TELEPHONE (OUTPATIENT)
Dept: ORTHOPEDIC SURGERY | Age: 47
End: 2022-12-06

## 2022-12-06 DIAGNOSIS — M17.12 PATELLOFEMORAL ARTHRITIS OF LEFT KNEE: ICD-10-CM

## 2022-12-06 DIAGNOSIS — M17.11 PRIMARY OSTEOARTHRITIS OF RIGHT KNEE: Primary | ICD-10-CM

## 2022-12-06 DIAGNOSIS — M17.12 PRIMARY OSTEOARTHRITIS OF LEFT KNEE: Primary | ICD-10-CM

## 2022-12-06 NOTE — TELEPHONE ENCOUNTER
General Question     Subject: 2nd Synvisc Injection  Patient and /or Facility Request: Leah Graham  Contact Number: [phone]       Patient has an appt for 02.03.2023 for Left Knee 2nd Synvisc Injection, wants to make sure medication is available for appt.

## 2022-12-07 NOTE — TELEPHONE ENCOUNTER
Left patient message,  Told her we are able to submit for gel injection beginning of January, We can submit 30 days before due date of next injection.     Gel is every 6 months, Patients last gel injection was August.

## 2022-12-14 ENCOUNTER — TELEPHONE (OUTPATIENT)
Dept: UROGYNECOLOGY | Age: 47
End: 2022-12-14

## 2022-12-14 NOTE — TELEPHONE ENCOUNTER
Will fax office notes that refer patient to [facility], [address] [facility].      Unable to place referral for outside MD

## 2023-01-11 DIAGNOSIS — M17.11 PRIMARY OSTEOARTHRITIS OF RIGHT KNEE: ICD-10-CM

## 2023-01-11 DIAGNOSIS — M17.12 PRIMARY OSTEOARTHRITIS OF LEFT KNEE: Primary | ICD-10-CM

## 2023-02-03 ENCOUNTER — OFFICE VISIT (OUTPATIENT)
Dept: ORTHOPEDIC SURGERY | Age: 48
End: 2023-02-03

## 2023-02-03 VITALS — HEIGHT: 63 IN | BODY MASS INDEX: 26.58 KG/M2 | WEIGHT: 150 LBS

## 2023-02-03 DIAGNOSIS — M17.12 PRIMARY OSTEOARTHRITIS OF LEFT KNEE: Primary | ICD-10-CM

## 2023-02-03 DIAGNOSIS — G57.72 COMPLEX REGIONAL PAIN SYNDROME TYPE 2 OF LEFT LOWER EXTREMITY: ICD-10-CM

## 2023-02-03 DIAGNOSIS — G89.29 CHRONIC PAIN OF LEFT KNEE: ICD-10-CM

## 2023-02-03 DIAGNOSIS — M25.562 CHRONIC PAIN OF LEFT KNEE: ICD-10-CM

## 2023-02-03 NOTE — PROGRESS NOTES
Chief Complaint    Knee Pain (Left knee injection, pain 7/10)      History of Present Illness:  Jose Ramon Shukla is a 41 y.o. female who presents for a viscosupplementation injection in the left knee. To recap, this patient well-known to [facility] and is being treated with conservative therapy for the patellofemoral arthritis in her left knee and left lower extremity CRPS. To briefly recap this patient sustained an injury in 2021 playing soccer with her kids. She had an ACL tear and a subsequent reconstruction by Dr. Chelo Gonzalez. She began experiencing hypersensitivity in the left lower extremity was seen by vascular she was diagnosed with CRPS. She has been seen by pain management and received several lumbar sympathetic nerve blocks. She otherwise is being treated conservatively for the OA in her left knee. This conservative treatment includes; rest, ice, elevation, bracing, physical therapy, home exercises, activity modification, NSAIDs as needed, corticosteroid injections and visco supplementation injections. She is currently undering going investigation for a possible diagnosis of fibromyalgia. The patient denies any new injury. Physical exam:  Inspection: Both knees without erythema, ecchymosis, discoloration, abrasion, contusions, deformity or signs of infection. Palpation: There is no tenderness to palpation to the joint line of both knees. There is no patellofemoral crepitus palpable in both knees. No tenderness to palpation to any other osseous or soft tissue structures of the knee. Range of motion: Grossly intact        Procedures:    VISCO SUPPLEMENTATION  INJECTION:  Left  KNEE    PROCEDURE: Risks, benefits, and alternatives to the injections were discussed in detail with the patient. The risks discussed included but are not limited to infection, skin reactions, hot swollen joints, and anaphylaxis. After informed consent was provided, the patient sat on the exam table.  The anterior lateral aspect of the left knee was prepped with Chlora-prep. The skin and subcutaneous tissues were anesthetized with ethyl chloride spray and 1% lidocaine injected with a 20-gauge needle. The needle was left in place and the syringe was detached from the needle. The Synvisc One syringe was attached to the 20-gauge needle and 48 mg of the Synvisc One was injected into the knee without resistance. The needle was withdrawn and the puncture site sealed with a Band-Aid. The patient tolerated the procedure well. Patient was educated on postinjection precautions. (Conducted by Klaus Stewart PA-C)      Assessment: Vicky Becker is a 41 y.o. female who presents for a viscosupplumentation injection. She is being treated conservatively for the OA in her left knee. Encounter Diagnoses   Name Primary? Primary osteoarthritis of left knee Yes    Complex regional pain syndrome type 2 of left lower extremity     Chronic pain of left knee          Treatment plan:  Observe postinjection precautions  Rest   Ice 20 minutes ever 1-2 hours PRN  Utilize medications as prescribed  Activity modification  Lightweight exercise/low impact exercise  Appropriate diet/weight loss                Klaus Stewart PA-C  02/06/23 4:16 PM  Physician Assistant - Certified         This dictation was performed with a verbal recognition program (DRAGON) and it was checked for errors. It is possible that there are still dictated errors within this office note. If so, please bring any errors to my attention for an addendum. All efforts were made to ensure that this office note is accurate.

## 2023-02-14 ENCOUNTER — HOSPITAL ENCOUNTER (OUTPATIENT)
Dept: MAMMOGRAPHY | Age: 48
Discharge: HOME | End: 2023-02-14
Payer: COMMERCIAL

## 2023-02-14 VITALS — HEIGHT: 63 IN | BODY MASS INDEX: 26.58 KG/M2 | WEIGHT: 150 LBS

## 2023-02-14 DIAGNOSIS — Z12.31 ENCOUNTER FOR SCREENING MAMMOGRAM FOR BREAST CANCER: ICD-10-CM

## 2023-02-14 PROCEDURE — 77067 SCR MAMMO BI INCL CAD: CPT

## 2023-03-01 ENCOUNTER — OFFICE VISIT (OUTPATIENT)
Dept: UROGYNECOLOGY | Age: 48
End: 2023-03-01
Payer: COMMERCIAL

## 2023-03-01 VITALS
OXYGEN SATURATION: 99 % | SYSTOLIC BLOOD PRESSURE: 106 MMHG | RESPIRATION RATE: 16 BRPM | HEART RATE: 83 BPM | TEMPERATURE: 98 F | DIASTOLIC BLOOD PRESSURE: 74 MMHG

## 2023-03-01 DIAGNOSIS — M62.838 LEVATOR SPASM: ICD-10-CM

## 2023-03-01 DIAGNOSIS — N94.819 VULVODYNIA: Primary | ICD-10-CM

## 2023-03-01 PROCEDURE — 99213 OFFICE O/P EST LOW 20 MIN: CPT | Performed by: NURSE PRACTITIONER

## 2023-03-01 NOTE — PROGRESS NOTES
3/1/2023       HPI:     Name: Louise Britton  YOB: 1975    CC: Patient is a 41 y.o. presenting for evaluation of  Vulvodynia . HPI: How long have you had this problem? months  Please rate the severity of your problem: moderate  Anything make it better? Vaginal Valium suppositories helpful, need refill    Saw Dr. Cristhian Steen, did not have any more recommendations and not in network - would like to continue are here for her vulvodynia vaginal valium refills. Has done PFPT    PMH:  Has been fighting Strep multiple times. New diagnosis of fibromyalgia. She is struggling with her vulvodynia and does find her prescribed Elavil (for IBS) and vaginal valium is helpful. She is on gabapentin from [facility].          Ob/Gyn History:    OB History    Para Term  AB Living   7 4 4 0 3 4   SAB IAB Ectopic Molar Multiple Live Births   3 0 0   0 4      # Outcome Date GA Lbr Parker/2nd Weight Sex Delivery Anes PTL Lv   7 Term 17 39w2d  7 lb 9.2 oz (3.435 kg) M Vag-Spont   MAREN      Birth Comments: montesinos    10 Term 08 40w0d  6 lb (2.722 kg) F Vag-Spont   MAREN   5 Term 04 40w0d  6 lb (2.722 kg) M Vag-Spont   MAREN   4 Term 00 40w0d  8 lb 7 oz (3.827 kg) M Vag-Spont   MAREN   3 SAB            2 SAB            1 SAB              Past Medical History:   Past Medical History:   Diagnosis Date    Anesthesia     HR has dropped after surgeries in the past    Anxiety     Depression     Disease of blood and blood forming organ     Liden factor V    [facility]     Hashimoto's disease     no meds    Postpartum depression     no meds    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 [address] ENDOSCOPY N/A 11/26/2019    EGD BIOPSY performed by Melinda Bansal MD at [address]     Allergies: Allergies   Allergen Reactions    Sulfa Antibiotics Rash     Current Medications:  Current Outpatient Medications   Medication Sig Dispense Refill    ALPRAZolam (XANAX) 0.25 MG tablet TAKE 1 TABLET BY MOUTH EVERY DAY AS NEEDED FOR ANXIETY      fluconazole (DIFLUCAN) 100 MG tablet TAKE 1 TABLET BY MOUTH EVERY DAY      hydrOXYzine (ATARAX) 25 MG tablet       amitriptyline (ELAVIL) 25 MG tablet       famotidine (PEPCID) 20 MG tablet       gabapentin (NEURONTIN) 300 MG capsule       omeprazole (PRILOSEC) 20 MG delayed release capsule Take 20 mg by mouth Daily       No current facility-administered medications for this visit.      Social History:   Social History     Socioeconomic History    Marital status: Single     Spouse name: Not on file    Number of children: Not on file    Years of education: Not on file    Highest education level: Not on file   Occupational History    Not on file   Tobacco Use    Smoking status: Never    Smokeless tobacco: Never   Vaping Use    Vaping Use: Never used   Substance and Sexual Activity    Alcohol use: Yes     Comment: socially    Drug use: No    Sexual activity: Yes     Partners: Male   Other Topics Concern    Not on file   Social History Narrative    Not on file     Social Determinants of Health     Financial Resource Strain: Not on file   Food Insecurity: Not on file   Transportation Needs: Not on file   Physical Activity: Not on file   Stress: Not on file   Social Connections: Not on file   Intimate Partner Violence: Not on file   Housing Stability: Not on file     Family History:   Family History   Problem Relation Age of Onset    High Blood Pressure Father     Cancer Father     Diabetes Brother          Objective:     Vitals  Vitals:    03/01/23 0930   BP: 106/74   Pulse: 83   Resp: 16   Temp: 98 °F (36.7 °C)   SpO2: 99%     Physical Exam  Physical Exam  Vitals and nursing note reviewed. Constitutional:       Appearance: Normal appearance. HENT:      Head: Normocephalic. Eyes:      Conjunctiva/sclera: Conjunctivae normal.   Cardiovascular:      Rate and Rhythm: Normal rate. Pulmonary:      Effort: Pulmonary effort is normal.   Musculoskeletal:         General: Normal range of motion. Cervical back: Normal range of motion. Skin:     General: Skin is warm and dry. Neurological:      General: No focal deficit present. Mental Status: She is alert. Psychiatric:         Mood and Affect: Mood normal.         Behavior: Behavior normal.       No results found for this visit on 03/01/23. Assessment/Plan:     Nae Paez is a 41 y.o. female with   1. Vulvodynia    2. Levator spasm    -[facility] reviewed  -Vulvodynia:  finds vaginal valium to be helpful and desires to continue care with us for refills. She did see Dr. Oliver Schwartz with no additional diagnosis or treatment options  [facility] fax[facility] to [facility] for Vaginal valium  She will follow up in 6 months or sooner if any concern  TREVER Mcpherson - CNP        No orders of the defined types were placed in this encounter. No orders of the defined types were placed in this encounter.       TREVER Mcpherson - CNP

## 2023-06-12 ENCOUNTER — TELEPHONE (OUTPATIENT)
Dept: UROGYNECOLOGY | Age: 48
End: 2023-06-12

## 2023-06-12 NOTE — TELEPHONE ENCOUNTER
Spoke with patient over the phone. Patient requesting medical records to be sent to her via email in regard to her referral to Dr. Brenda Bennett. Informed patient these would be sent to her email today.

## 2023-06-21 ENCOUNTER — TELEPHONE (OUTPATIENT)
Dept: UROGYNECOLOGY | Age: 48
End: 2023-06-21

## 2023-06-21 NOTE — TELEPHONE ENCOUNTER
Per Dr. Raven brooks to send another 3 month supply, patient to have 6 month follow up per Katelyn Wiggins NP.

## 2023-06-22 ENCOUNTER — TELEPHONE (OUTPATIENT)
Dept: UROGYNECOLOGY | Age: 48
End: 2023-06-22

## 2023-06-22 NOTE — TELEPHONE ENCOUNTER
Notified patient that refill was sent in per request - advised patient that she will need to be seen in the next 3 months for next refill. Patient verbalized understanding.

## 2023-07-31 ENCOUNTER — TELEPHONE (OUTPATIENT)
Dept: ORTHOPEDIC SURGERY | Age: 48
End: 2023-07-31

## 2023-07-31 NOTE — TELEPHONE ENCOUNTER
General Question     Subject: INJ QUESTION  Patient and /or Facility Request: Marci Mckinney  Contact Number: [phone]    PT HAS APPT FOR Friday 8/ 4/23 WOULD LIKE TO VERF THE INJ SHE REQUESTED WILL BE AVAILABLE    PLEASE CALL PT AT ABOVE PHN NUMBER

## 2023-08-01 DIAGNOSIS — M25.562 CHRONIC PAIN OF LEFT KNEE: ICD-10-CM

## 2023-08-01 DIAGNOSIS — G89.29 CHRONIC PAIN OF LEFT KNEE: ICD-10-CM

## 2023-08-01 DIAGNOSIS — M17.12 PRIMARY OSTEOARTHRITIS OF LEFT KNEE: Primary | ICD-10-CM

## 2023-08-01 NOTE — TELEPHONE ENCOUNTER
I have a gel injection in the office from [address] [place] [address] filled 1/20/23. I will give injection to Carlos to take to [facility].

## 2023-08-04 ENCOUNTER — OFFICE VISIT (OUTPATIENT)
Dept: ORTHOPEDIC SURGERY | Age: 48
End: 2023-08-04

## 2023-08-04 VITALS — BODY MASS INDEX: 26.58 KG/M2 | WEIGHT: 150 LBS | HEIGHT: 63 IN

## 2023-08-04 DIAGNOSIS — Z98.890 HISTORY OF RECONSTRUCTION OF ANTERIOR CRUCIATE LIGAMENT TEAR: ICD-10-CM

## 2023-08-04 DIAGNOSIS — G89.29 CHRONIC PAIN OF LEFT KNEE: ICD-10-CM

## 2023-08-04 DIAGNOSIS — M25.562 CHRONIC PAIN OF LEFT KNEE: ICD-10-CM

## 2023-08-04 DIAGNOSIS — G57.72 COMPLEX REGIONAL PAIN SYNDROME TYPE 2 OF LEFT LOWER EXTREMITY: ICD-10-CM

## 2023-08-04 DIAGNOSIS — M17.12 PRIMARY OSTEOARTHRITIS OF LEFT KNEE: Primary | ICD-10-CM

## 2023-08-04 RX ORDER — LIDOCAINE HYDROCHLORIDE 10 MG/ML
2 INJECTION, SOLUTION INFILTRATION; PERINEURAL ONCE
Status: COMPLETED | OUTPATIENT
Start: 2023-08-04 | End: 2023-08-04

## 2023-08-04 RX ADMIN — LIDOCAINE HYDROCHLORIDE 2 ML: 10 INJECTION, SOLUTION INFILTRATION; PERINEURAL at 10:51

## 2023-08-04 NOTE — PROGRESS NOTES
Chief Complaint    Knee Pain (Left knee synvisc injection)      History of Present Illness:  Montserrat Swan is a 57 y.o. female who presents for a viscosupplementation injection. To recap, this patient well-known to [facility] and is being treated with conservative therapy for the patellofemoral arthritis in her left knee and left lower extremity CRPS. To briefly recap this patient sustained an injury in 2021 playing soccer with her kids. She had an ACL tear and a subsequent reconstruction by Dr. Isabela Warner. She began experiencing hypersensitivity in the left lower extremity was seen by vascular she was diagnosed with CRPS. She has been seen by pain management and received several lumbar sympathetic nerve blocks. She otherwise is being treated conservatively for the OA in her left knee. This conservative treatment includes; rest, ice, elevation, bracing, physical therapy, home exercises, activity modification, NSAIDs as needed, corticosteroid injections and visco supplementation injections. She feels that her pain is been improving and worsening off and on over the past several months. 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  She works 10 to 12-hour shifts 3 to 4 days a week. Her symptoms are worse when she is on her feet for long durations of time. In addition she is moving houses and therefore conducting more activity with boxing of her belongings. There is more patellofemoral and medial compartment discomfort. She occasionally notes catching and sharp discomfort with activity. She continues to take 3 mg of gabapentin at night for her CRPS. The patient denies any new injury. The patient denies any numbness, paresthesias, or weakness. Physical exam:  Inspection: Both knees without erythema, ecchymosis, discoloration, abrasion, contusions, deformity or signs of infection. Palpation: There is no tenderness to palpation to the joint line of both knees.   There is no

## 2023-10-16 ENCOUNTER — TELEPHONE (OUTPATIENT)
Dept: UROGYNECOLOGY | Age: 48
End: 2023-10-16

## 2023-10-16 NOTE — TELEPHONE ENCOUNTER
Pt requires follow up visit to renew medication. Pt states Corrinne Collier was never told to come in\" and is very frustrated. We do not offer tele visits at this time. I informed her that no one here believes that she is drug seeking. She is upset because she has been splitting suppositories in half to get by. I informed her that it appears we informed her on 06/22/2023 that she would need another visit to get a refill on this medication. She does not remember this. Scheduled for a visit tomorrow with Dr. Otoniel Palomo for a renewal of this medication.  10/16/2023 at 10:36 AM

## 2023-10-17 ENCOUNTER — OFFICE VISIT (OUTPATIENT)
Dept: UROGYNECOLOGY | Age: 48
End: 2023-10-17
Payer: COMMERCIAL

## 2023-10-17 VITALS
DIASTOLIC BLOOD PRESSURE: 76 MMHG | SYSTOLIC BLOOD PRESSURE: 118 MMHG | RESPIRATION RATE: 16 BRPM | OXYGEN SATURATION: 99 % | HEART RATE: 76 BPM | TEMPERATURE: 98.1 F

## 2023-10-17 DIAGNOSIS — R30.0 DYSURIA: ICD-10-CM

## 2023-10-17 DIAGNOSIS — M62.838 LEVATOR SPASM: ICD-10-CM

## 2023-10-17 DIAGNOSIS — N94.819 VULVODYNIA: Primary | ICD-10-CM

## 2023-10-17 PROCEDURE — 99213 OFFICE O/P EST LOW 20 MIN: CPT | Performed by: OBSTETRICS & GYNECOLOGY

## 2023-10-17 NOTE — PROGRESS NOTES
10/17/2023       HPI:     Name: Griselda Gutter  YOB: 1975    CC: Patient is a 57 y.o. presenting for evaluation of  vulvodynia and levator spasms . HPI: How long have you had this problem? years  Please rate the severity of your problem: moderate  Anything make it better? Vaginal Valium Suppositories- needs refills, has been over 6 months since last follow up.     Ob/Gyn History:    OB History    Para Term  AB Living   7 4 4 0 3 4   SAB IAB Ectopic Molar Multiple Live Births   3 0 0   0 4      # Outcome Date GA Lbr Parker/2nd Weight Sex Delivery Anes PTL Lv   7 Term 17 39w2d  7 lb 9.2 oz (3.435 kg) M Vag-Spont   MAREN      Birth Comments: montesinos    10 Term 08 40w0d  6 lb (2.722 kg) F Vag-Spont   MAREN   5 Term 04 40w0d  6 lb (2.722 kg) M Vag-Spont   MAREN   4 Term 00 40w0d  8 lb 7 oz (3.827 kg) M Vag-Spont   MAREN   3 SAB            2 SAB            1 SAB              Past Medical History:   Past Medical History:   Diagnosis Date    Anesthesia     HR has dropped after surgeries in the past    Anxiety     Depression     Disease of blood and blood forming organ     Liden factor V    Factor 5 Leiden mutation, heterozygous ([address])     Hashimoto's disease     no meds    Postpartum depression     no meds    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

## 2024-03-06 ENCOUNTER — HOSPITAL ENCOUNTER (OUTPATIENT)
Dept: MAMMOGRAPHY | Age: 49
Discharge: HOME | End: 2024-03-06
Payer: COMMERCIAL

## 2024-03-06 DIAGNOSIS — Z12.31 ENCOUNTER FOR SCREENING MAMMOGRAM FOR BREAST CANCER: ICD-10-CM

## 2024-03-06 PROCEDURE — 77063 BREAST TOMOSYNTHESIS BI: CPT

## 2024-03-15 ENCOUNTER — TELEPHONE (OUTPATIENT)
Dept: UROGYNECOLOGY | Age: 49
End: 2024-03-15

## 2024-03-15 NOTE — TELEPHONE ENCOUNTER
Refill sent to the [facility] pharmacy if patient calls back. Electronically signed by Roberto Fall RN on 3/15/2024 at 4:04 PM

## 2024-03-18 NOTE — TELEPHONE ENCOUNTER
Spoke with patient over the phone she is aware that refill was sent in and she will call pharmacy later. Electronically signed by Roberto Fall RN on 3/18/2024 at 8:40 AM

## 2024-03-18 NOTE — TELEPHONE ENCOUNTER
Attempted to reach patient again. Asked her to call us back to make sure she has refill. Electronically signed by Roberto Fall RN on 3/18/2024 at 8:39 AM

## 2024-04-15 NOTE — PROGRESS NOTES
2024       HPI:     Name: Subha Buckner  YOB: 1975    CC: Patient is a 49 y.o. presenting for evaluation of  Vulvodynia, levator spasm, and dysuria .       HPI: How long have you had this problem?  years  Please rate the severity of your problem: moderate  Anything make it better? She reports she is taking vaginal Valium suppositories - recently filled last refill. She reports worsening symptoms with menses and stress. She is taking elavil and gabapentin. Last went to [facility] spring 2023 per pt.    PMH:  No changes  PSH: No changes    She does note her vulvodynia can be very cyclical.  She has paraguard IUD as she is unable to take hormones.     Continues Elavil, gabapentin and vaginal valium suppositories at night for her vulvodynia.      Her PCP has taken over gabapentin and Elavil prescriptions as she no longer sees pain clinic.      Ob/Gyn History:    OB History    Para Term  AB Living   7 4 4 0 3 4   SAB IAB Ectopic Molar Multiple Live Births   3 0 0   0 4      # Outcome Date GA Lbr Parker/2nd Weight Sex Delivery Anes PTL Lv   7 Term 17 39w2d  3.435 kg (7 lb 9.2 oz) M Vag-Spont   MAREN      Birth Comments: montesinos    6 Term 08 40w0d  2.722 kg (6 lb) F Vag-Spont   MAREN   5 Term 04 40w0d  2.722 kg (6 lb) M Vag-Spont   MAREN   4 Term 00 40w0d  3.827 kg (8 lb 7 oz) M Vag-Spont   MAREN   3 SAB            2 SAB            1 SAB              Past Medical History:   Past Medical History:   Diagnosis Date    Anesthesia     HR has dropped after surgeries in the past    Anxiety     Depression     Disease of blood and blood forming organ     Liden factor V    Factor 5 Leiden mutation, heterozygous (HCC)     Hashimoto's disease     no meds    Postpartum depression     no meds    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, not pregnant     Thyroid disease     Auto-immune Hashimotos- no meds     Past Surgical History:   Past Surgical History:   Procedure Laterality Date

## 2024-04-17 ENCOUNTER — OFFICE VISIT (OUTPATIENT)
Dept: UROGYNECOLOGY | Age: 49
End: 2024-04-17
Payer: COMMERCIAL

## 2024-04-17 VITALS
SYSTOLIC BLOOD PRESSURE: 114 MMHG | HEART RATE: 65 BPM | DIASTOLIC BLOOD PRESSURE: 74 MMHG | TEMPERATURE: 98 F | RESPIRATION RATE: 16 BRPM | OXYGEN SATURATION: 99 %

## 2024-04-17 DIAGNOSIS — M62.838 LEVATOR SPASM: ICD-10-CM

## 2024-04-17 DIAGNOSIS — N94.819 VULVODYNIA: Primary | ICD-10-CM

## 2024-04-17 PROCEDURE — 99213 OFFICE O/P EST LOW 20 MIN: CPT | Performed by: NURSE PRACTITIONER

## 2024-08-30 ENCOUNTER — TELEPHONE (OUTPATIENT)
Dept: ORTHOPEDIC SURGERY | Age: 49
End: 2024-08-30

## 2024-08-30 NOTE — TELEPHONE ENCOUNTER
General Question     Subject: LOOKING FOR APPROVAL FOR THE GEL INJECTIONS. HER INSURANCE HAS CHANGED TO [facility]  Patient and /or Facility Request: Subha Buckner   Contact Number: [phone]

## 2024-09-03 DIAGNOSIS — G89.29 CHRONIC PAIN OF LEFT KNEE: ICD-10-CM

## 2024-09-03 DIAGNOSIS — M17.12 PRIMARY OSTEOARTHRITIS OF LEFT KNEE: Primary | ICD-10-CM

## 2024-09-03 DIAGNOSIS — M25.562 CHRONIC PAIN OF LEFT KNEE: ICD-10-CM

## 2024-09-04 NOTE — TELEPHONE ENCOUNTER
General Question     Subject: INJ APPROVAL   Patient and /or Facility Request: Subha Buckner   Contact Number: [phone]      PATIENT CALLED REQ TO BE UPDATED REGARDING GETTING APPROVAL THROUGH HER NEW INSURANCE [employer] FOR THE GEL INJ    PLEASE CALL BACK THE ABOVE NUMBER TO FURTHER ASSIST

## 2024-09-04 NOTE — TELEPHONE ENCOUNTER
Gel order was submitted yesterday, still awaiting approval - patient will be called once approved.

## 2024-10-03 ENCOUNTER — OFFICE VISIT (OUTPATIENT)
Dept: ORTHOPEDIC SURGERY | Age: 49
End: 2024-10-03
Payer: COMMERCIAL

## 2024-10-03 DIAGNOSIS — G89.29 CHRONIC PAIN OF LEFT KNEE: ICD-10-CM

## 2024-10-03 DIAGNOSIS — G57.72 COMPLEX REGIONAL PAIN SYNDROME TYPE 2 OF LEFT LOWER EXTREMITY: ICD-10-CM

## 2024-10-03 DIAGNOSIS — M17.12 PRIMARY OSTEOARTHRITIS OF LEFT KNEE: Primary | ICD-10-CM

## 2024-10-03 DIAGNOSIS — M25.562 CHRONIC PAIN OF LEFT KNEE: ICD-10-CM

## 2024-10-03 PROCEDURE — 20610 DRAIN/INJ JOINT/BURSA W/O US: CPT | Performed by: PHYSICIAN ASSISTANT

## 2024-10-03 RX ORDER — METHYLPREDNISOLONE ACETATE 40 MG/ML
40 INJECTION, SUSPENSION INTRA-ARTICULAR; INTRALESIONAL; INTRAMUSCULAR; SOFT TISSUE ONCE
Status: COMPLETED | OUTPATIENT
Start: 2024-10-03 | End: 2024-10-03

## 2024-10-03 RX ORDER — LIDOCAINE HYDROCHLORIDE 10 MG/ML
1 INJECTION, SOLUTION INFILTRATION; PERINEURAL ONCE
Status: COMPLETED | OUTPATIENT
Start: 2024-10-03 | End: 2024-10-03

## 2024-10-03 RX ADMIN — METHYLPREDNISOLONE ACETATE 40 MG: 40 INJECTION, SUSPENSION INTRA-ARTICULAR; INTRALESIONAL; INTRAMUSCULAR; SOFT TISSUE at 10:18

## 2024-10-03 RX ADMIN — LIDOCAINE HYDROCHLORIDE 1 ML: 10 INJECTION, SOLUTION INFILTRATION; PERINEURAL at 10:18

## 2024-10-08 NOTE — PROGRESS NOTES
precautions  Rest   Ice 20 minutes ever 1-2 hours PRN  Utilize medications as prescribed  Activity modification  Lightweight exercise/low impact exercise  Appropriate diet/weight loss  Referral to pain management                Carlos Loredo PA-C  10/08/24 7:43 PM  Physician Assistant - Certified         This dictation was performed with a verbal recognition program (DRAGON) and it was checked for errors.  It is possible that there are still dictated errors within this office note.  If so, please bring any errors to my attention for an addendum.  All efforts were made to ensure that this office note is accurate.

## 2024-10-23 ENCOUNTER — OFFICE VISIT (OUTPATIENT)
Dept: UROGYNECOLOGY | Age: 49
End: 2024-10-23
Payer: COMMERCIAL

## 2024-10-23 VITALS
TEMPERATURE: 98 F | OXYGEN SATURATION: 98 % | DIASTOLIC BLOOD PRESSURE: 65 MMHG | HEART RATE: 69 BPM | RESPIRATION RATE: 20 BRPM | SYSTOLIC BLOOD PRESSURE: 102 MMHG

## 2024-10-23 DIAGNOSIS — N94.819 VULVODYNIA: Primary | ICD-10-CM

## 2024-10-23 DIAGNOSIS — M62.838 LEVATOR SPASM: ICD-10-CM

## 2024-10-23 PROCEDURE — 99213 OFFICE O/P EST LOW 20 MIN: CPT | Performed by: NURSE PRACTITIONER

## 2024-10-23 NOTE — PROGRESS NOTES
10/23/2024       HPI:     Name: Subha Buckner  YOB: 1975    CC: Patient is a 49 y.o. presenting for evaluation of Vulvodynia.       HPI: How long have you had this problem?  years  Please rate the severity of your problem: mild  Anything make it better? Patient is taking vaginal valium, reports that this has been effective for her. She continues low dose elavil and gabapentin through her primary care physician    PMH: No changes  PSH: No changes    Continues Elavil, gabapentin and vaginal valium suppositories at night for her vulvodynia.       Her PCP has taken over gabapentin and Elavil prescriptions as she no longer sees pain clinic.  Ob/Gyn History:    OB History    Para Term  AB Living   7 4 4 0 3 4   SAB IAB Ectopic Molar Multiple Live Births   3 0 0   0 4      # Outcome Date GA Lbr Parker/2nd Weight Sex Type Anes PTL Lv   7 Term 17 39w2d  3.435 kg (7 lb 9.2 oz) M Vag-Spont   MAREN      Birth Comments: montesinos    6 Term 08 40w0d  2.722 kg (6 lb) F Vag-Spont   MAREN   5 Term 04 40w0d  2.722 kg (6 lb) M Vag-Spont   MAREN   4 Term 00 40w0d  3.827 kg (8 lb 7 oz) M Vag-Spont   MAREN   3 SAB            2 SAB            1 SAB              Past Medical History:   Past Medical History:   Diagnosis Date    Anesthesia     HR has dropped after surgeries in the past    Anxiety     Depression     Disease of blood and blood forming organ     Liden factor V    Factor 5 Leiden mutation, heterozygous (HCC)     Hashimoto's disease     no meds    Postpartum depression     no meds    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, not pregnant     Thyroid disease     Auto-immune Hashimotos- no meds     Past Surgical History:   Past Surgical History:   Procedure Laterality Date    ANTERIOR CRUCIATE LIGAMENT REPAIR Left 2020    ARTHROSCOPIC LEFT KNEE MEDIAL MENISCU REPARI AND ANTERIOR CRUCIATE LIGAMENT BONE PATELLA TENDON BONE RECONSTRUCTION - (REGIONAL BLOCK) performed by Quang

## 2024-11-22 ENCOUNTER — TELEPHONE (OUTPATIENT)
Dept: UROGYNECOLOGY | Age: 49
End: 2024-11-22

## 2024-11-22 NOTE — TELEPHONE ENCOUNTER
Left voicemail for patient to call us back to schedule an appointment. Will send her vaginal valium prescription to the [place] pharmacy. Electronically signed by Roberto Fall RN on 11/22/2024 at 11:27 AM

## 2024-11-22 NOTE — TELEPHONE ENCOUNTER
Left voicemail for patient to call us back to schedule 6 month appointment. Electronically signed by Roberto Fall RN on 11/22/2024 at 11:56 AM

## 2025-03-11 ENCOUNTER — HOSPITAL ENCOUNTER (OUTPATIENT)
Dept: MAMMOGRAPHY | Age: 50
Discharge: HOME | End: 2025-03-11
Payer: COMMERCIAL

## 2025-03-11 VITALS — HEIGHT: 63 IN | WEIGHT: 150 LBS | BODY MASS INDEX: 26.58 KG/M2

## 2025-03-11 DIAGNOSIS — Z12.31 VISIT FOR SCREENING MAMMOGRAM: ICD-10-CM

## 2025-03-11 PROCEDURE — 77063 BREAST TOMOSYNTHESIS BI: CPT

## 2025-03-19 ENCOUNTER — HOSPITAL ENCOUNTER (OUTPATIENT)
Dept: ULTRASOUND IMAGING | Age: 50
Discharge: HOME | End: 2025-03-19
Payer: COMMERCIAL

## 2025-03-19 ENCOUNTER — HOSPITAL ENCOUNTER (OUTPATIENT)
Dept: MAMMOGRAPHY | Age: 50
Discharge: HOME | End: 2025-03-19
Payer: COMMERCIAL

## 2025-03-19 DIAGNOSIS — R92.8 ABNORMAL MAMMOGRAM: ICD-10-CM

## 2025-03-19 DIAGNOSIS — R92.8 ABNORMAL MAMMOGRAM OF RIGHT BREAST: ICD-10-CM

## 2025-03-19 PROCEDURE — G0279 TOMOSYNTHESIS, MAMMO: HCPCS

## 2025-03-19 PROCEDURE — 76642 ULTRASOUND BREAST LIMITED: CPT

## 2025-04-08 ENCOUNTER — OFFICE VISIT (OUTPATIENT)
Dept: ORTHOPEDIC SURGERY | Age: 50
End: 2025-04-08
Payer: COMMERCIAL

## 2025-04-08 VITALS — BODY MASS INDEX: 26.58 KG/M2 | WEIGHT: 150 LBS | HEIGHT: 63 IN

## 2025-04-08 DIAGNOSIS — M25.561 ACUTE PAIN OF RIGHT KNEE: Primary | ICD-10-CM

## 2025-04-08 PROCEDURE — 99213 OFFICE O/P EST LOW 20 MIN: CPT | Performed by: PHYSICIAN ASSISTANT

## 2025-04-08 PROCEDURE — E0114 CRUTCH UNDERARM PAIR NO WOOD: HCPCS | Performed by: PHYSICIAN ASSISTANT

## 2025-04-08 NOTE — PROGRESS NOTES
Subjective:      Patient ID: Subha Buckner is a 50 y.o. female who presents to the [facility] for chief complaint of right lateral knee pain.  Pain began about 2 to 2-1/2 months ago when she fell and injured her right knee.  She is unsure of the exact mechanism of injury at the time of the fall.  She states she is having difficulty kneeling on the right knee.  She feels instability.  She states pain does radiate down the anterior lateral calf towards the ankle and occasionally proximal towards the lateral hip.  She denies perceived weakness in the lower extremity or significant numbness and tingling.    Pain Scale 6/10 VAS.  Location of pain right lateral knee.  Pain is worse with weight bearing.   Pain improves with elevation.   Previous treatments have included ice.     Review of Systems:  I have reviewed the clinically relevant past medical history, medications, allergies, family history, social history, and 13 point Review of Systems from the patient's recent history form & documented any details relevant to today's presenting complaints in the history above. The patient's self-reported past medical history, medications, allergies, family history, social history, and Review of Systems form from today and has been scanned into the chart under the \"Media\" tab.    Constitutional: denies fever, chills, weight loss.  MSK: denies pain in other joints, muscle aches.  Neurological: denies numbness, tingling, weakness.       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

## 2025-04-09 ENCOUNTER — TELEPHONE (OUTPATIENT)
Dept: ORTHOPEDIC SURGERY | Age: 50
End: 2025-04-09

## 2025-04-09 NOTE — TELEPHONE ENCOUNTER
General Question     Subject: MRI REFERRAL  Patient and /or Facility Request: Subha Buckner   Contact Number: [phone]     PATIENT REQUESTING THAT THE MRI REFERRAL BE SENT OVER TO THE [facility] IN [place]     PLEASE CALL PATIENT AT THE ABOVE NUMBER ONCE THE REFERRAL IS SENT

## 2025-04-10 ENCOUNTER — TELEPHONE (OUTPATIENT)
Dept: ORTHOPEDIC SURGERY | Age: 50
End: 2025-04-10

## 2025-04-10 ENCOUNTER — RESULTS FOLLOW-UP (OUTPATIENT)
Dept: ORTHOPEDIC SURGERY | Age: 50
End: 2025-04-10

## 2025-04-10 ENCOUNTER — HOSPITAL ENCOUNTER (OUTPATIENT)
Age: 50
Discharge: HOME | End: 2025-04-10
Payer: COMMERCIAL

## 2025-04-10 DIAGNOSIS — M25.561 ACUTE PAIN OF RIGHT KNEE: ICD-10-CM

## 2025-04-10 PROCEDURE — 73721 MRI JNT OF LWR EXTRE W/O DYE: CPT

## 2025-04-10 NOTE — TELEPHONE ENCOUNTER
----- Message from Katrin RAMIREZ sent at 4/10/2025  3:39 PM EDT -----  Regarding: Specialty Message to Provider  Specialty Message to Provider    Relationship to Patient: Self     Patient Message: PATIENT CALLED TJ TO SPEAK WITH YANCY DIRECTLY  REGARDING HER MRI RESULTS. SHE STATED SHE WOULD LIKE TO KNOW WHAT IS NEEDED TO BE DONE UNTIL HER APPT TUESDAY  --------------------------------------------------------------------------------------------------------------------------    Call Back Information: OK to leave message on voicemail  Preferred Call Back Number: Phone  [phone]

## 2025-04-10 NOTE — TELEPHONE ENCOUNTER
----- Message from Blanche BRITO sent at 4/10/2025  2:19 PM EDT -----  Regarding: Specialty Message to Provider  Specialty Message to Provider    Relationship to Patient: Self     Patient Message: Pt CAN SEE HER MRI RESULTS. WHILE SHE HAS AN APPOINTMENT, SHE IS HOPING DR SANTAMARIA WILL LOOK AT THE RESULTS, ASAP AND GIVE HER A CALL TO DISCUSS WHAT SHE CAN ALREADY GATHER.  PLEASE CALL ASAP  --------------------------------------------------------------------------------------------------------------------------    Call Back Information: OK to leave message on voicemail  Preferred Call Back Number:  [phone]

## 2025-04-11 ENCOUNTER — ANESTHESIA EVENT (OUTPATIENT)
Dept: ENDOSCOPY | Age: 50
End: 2025-04-11
Payer: COMMERCIAL

## 2025-04-11 ENCOUNTER — ANESTHESIA (OUTPATIENT)
Dept: ENDOSCOPY | Age: 50
End: 2025-04-11
Payer: COMMERCIAL

## 2025-04-11 ENCOUNTER — HOSPITAL ENCOUNTER (OUTPATIENT)
Age: 50
Setting detail: OUTPATIENT SURGERY
Discharge: HOME | End: 2025-04-11
Attending: INTERNAL MEDICINE | Admitting: INTERNAL MEDICINE
Payer: COMMERCIAL

## 2025-04-11 VITALS
RESPIRATION RATE: 16 BRPM | TEMPERATURE: 97.4 F | HEIGHT: 63 IN | OXYGEN SATURATION: 100 % | HEART RATE: 58 BPM | SYSTOLIC BLOOD PRESSURE: 116 MMHG | WEIGHT: 150 LBS | BODY MASS INDEX: 26.58 KG/M2 | DIASTOLIC BLOOD PRESSURE: 68 MMHG

## 2025-04-11 LAB — HCG UR QL: NEGATIVE

## 2025-04-11 PROCEDURE — 3700000001 HC ADD 15 MINUTES (ANESTHESIA): Performed by: INTERNAL MEDICINE

## 2025-04-11 PROCEDURE — 3609027000 HC COLONOSCOPY: Performed by: INTERNAL MEDICINE

## 2025-04-11 PROCEDURE — 7100000010 HC PHASE II RECOVERY - FIRST 15 MIN: Performed by: INTERNAL MEDICINE

## 2025-04-11 PROCEDURE — 6360000002 HC RX W HCPCS

## 2025-04-11 PROCEDURE — 2580000003 HC RX 258

## 2025-04-11 PROCEDURE — 7100000011 HC PHASE II RECOVERY - ADDTL 15 MIN: Performed by: INTERNAL MEDICINE

## 2025-04-11 PROCEDURE — 3700000000 HC ANESTHESIA ATTENDED CARE: Performed by: INTERNAL MEDICINE

## 2025-04-11 PROCEDURE — 2709999900 HC NON-CHARGEABLE SUPPLY: Performed by: INTERNAL MEDICINE

## 2025-04-11 PROCEDURE — 84703 CHORIONIC GONADOTROPIN ASSAY: CPT

## 2025-04-11 RX ORDER — NALOXONE HYDROCHLORIDE 0.4 MG/ML
INJECTION, SOLUTION INTRAMUSCULAR; INTRAVENOUS; SUBCUTANEOUS PRN
Status: CANCELLED | OUTPATIENT
Start: 2025-04-11

## 2025-04-11 RX ORDER — SODIUM CHLORIDE 0.9 % (FLUSH) 0.9 %
5-40 SYRINGE (ML) INJECTION EVERY 12 HOURS SCHEDULED
Status: CANCELLED | OUTPATIENT
Start: 2025-04-11

## 2025-04-11 RX ORDER — SODIUM CHLORIDE 9 MG/ML
INJECTION, SOLUTION INTRAVENOUS PRN
Status: CANCELLED | OUTPATIENT
Start: 2025-04-11

## 2025-04-11 RX ORDER — HYDROMORPHONE HYDROCHLORIDE 2 MG/ML
0.5 INJECTION, SOLUTION INTRAMUSCULAR; INTRAVENOUS; SUBCUTANEOUS EVERY 5 MIN PRN
Refills: 0 | Status: CANCELLED | OUTPATIENT
Start: 2025-04-11

## 2025-04-11 RX ORDER — METOCLOPRAMIDE HYDROCHLORIDE 5 MG/ML
10 INJECTION INTRAMUSCULAR; INTRAVENOUS
Status: CANCELLED | OUTPATIENT
Start: 2025-04-11

## 2025-04-11 RX ORDER — LIDOCAINE HYDROCHLORIDE 20 MG/ML
INJECTION, SOLUTION INFILTRATION; PERINEURAL
Status: DISCONTINUED | OUTPATIENT
Start: 2025-04-11 | End: 2025-04-11 | Stop reason: SDUPTHER

## 2025-04-11 RX ORDER — SODIUM CHLORIDE 9 MG/ML
INJECTION, SOLUTION INTRAVENOUS
Status: DISCONTINUED | OUTPATIENT
Start: 2025-04-11 | End: 2025-04-11 | Stop reason: SDUPTHER

## 2025-04-11 RX ORDER — SODIUM CHLORIDE 0.9 % (FLUSH) 0.9 %
5-40 SYRINGE (ML) INJECTION PRN
Status: CANCELLED | OUTPATIENT
Start: 2025-04-11

## 2025-04-11 RX ORDER — FENTANYL CITRATE 50 UG/ML
25 INJECTION, SOLUTION INTRAMUSCULAR; INTRAVENOUS EVERY 5 MIN PRN
Refills: 0 | Status: CANCELLED | OUTPATIENT
Start: 2025-04-11

## 2025-04-11 RX ORDER — OXYCODONE HYDROCHLORIDE 5 MG/1
5 TABLET ORAL PRN
Refills: 0 | Status: CANCELLED | OUTPATIENT
Start: 2025-04-11 | End: 2025-04-11

## 2025-04-11 RX ORDER — ONDANSETRON 2 MG/ML
4 INJECTION INTRAMUSCULAR; INTRAVENOUS
Status: CANCELLED | OUTPATIENT
Start: 2025-04-11

## 2025-04-11 RX ORDER — OXYCODONE HYDROCHLORIDE 5 MG/1
10 TABLET ORAL PRN
Refills: 0 | Status: CANCELLED | OUTPATIENT
Start: 2025-04-11 | End: 2025-04-11

## 2025-04-11 RX ORDER — PROPOFOL 10 MG/ML
INJECTION, EMULSION INTRAVENOUS
Status: DISCONTINUED | OUTPATIENT
Start: 2025-04-11 | End: 2025-04-11 | Stop reason: SDUPTHER

## 2025-04-11 RX ADMIN — LIDOCAINE HYDROCHLORIDE 50 MG: 20 INJECTION, SOLUTION INFILTRATION; PERINEURAL at 10:16

## 2025-04-11 RX ADMIN — PROPOFOL 30 MG: 10 INJECTION, EMULSION INTRAVENOUS at 10:23

## 2025-04-11 RX ADMIN — SODIUM CHLORIDE: 9 INJECTION, SOLUTION INTRAVENOUS at 10:13

## 2025-04-11 RX ADMIN — PROPOFOL 150 MCG/KG/MIN: 10 INJECTION, EMULSION INTRAVENOUS at 10:18

## 2025-04-11 RX ADMIN — PROPOFOL 70 MG: 10 INJECTION, EMULSION INTRAVENOUS at 10:19

## 2025-04-11 ASSESSMENT — PAIN - FUNCTIONAL ASSESSMENT
PAIN_FUNCTIONAL_ASSESSMENT: 0-10
PAIN_FUNCTIONAL_ASSESSMENT: 0-10
PAIN_FUNCTIONAL_ASSESSMENT: NONE - DENIES PAIN
PAIN_FUNCTIONAL_ASSESSMENT: 0-10

## 2025-04-11 NOTE — ANESTHESIA POSTPROCEDURE EVALUATION
Department of Anesthesiology  Postprocedure Note    Patient: Subha Buckner  MRN: [id number]  YOB: 1975  Date of evaluation: 4/11/2025    Procedure Summary       Date: 04/11/25 Room / Location: [place] [address] / [facility]    Anesthesia Start: 1014 Anesthesia Stop: 1045    Procedure: COLONOSCOPY DIAGNOSTIC Diagnosis:       Colon cancer screening      Family history of colon cancer      History of colon polyps      (Colon cancer screening [Z12.11])      (Family history of colon cancer [Z80.0])      (History of colon polyps [Z86.0100])    Surgeons: Andrei Haas MD Responsible Provider: Rodolfo Pineda DO    Anesthesia Type: MAC ASA Status: 2            Anesthesia Type: No value filed.    Melanie Phase I: Melanie Score: 10    Melanie Phase II:      Anesthesia Post Evaluation    Patient location during evaluation: PACU  Patient participation: complete - patient participated  Level of consciousness: awake and alert  Airway patency: patent  Nausea & Vomiting: no nausea  Cardiovascular status: hemodynamically stable  Respiratory status: acceptable  Hydration status: stable  Pain management: adequate    No notable events documented.

## 2025-04-11 NOTE — H&P
Gastroenterology Note             Pre-operative History and Physical    Patient: Subha Buckner  : 1975  CSN:     History Obtained From:  patient and/or guardian.     HISTORY OF PRESENT ILLNESS:    The patient is a 50 y.o. female  here for average risk screening colonoscopy.      Past Medical History:    Past Medical History:   Diagnosis Date    Anesthesia     HR has dropped after surgeries in the past    Anxiety     Depression     Disease of blood and blood forming organ     Liden factor V    Factor 5 Leiden mutation, heterozygous     Hashimoto's disease     no meds    Postpartum depression     no meds    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, not pregnant     Thyroid disease     Auto-immune Hashimotos- no meds     Past Surgical History:    Past Surgical History:   Procedure Laterality Date    ANTERIOR CRUCIATE LIGAMENT REPAIR Left 2020    ARTHROSCOPIC LEFT KNEE MEDIAL MENISCU REPARI AND ANTERIOR CRUCIATE LIGAMENT BONE PATELLA TENDON BONE RECONSTRUCTION - (REGIONAL BLOCK) performed by Quang Guardado MD at [facility] OR    APPENDECTOMY      BLADDER SUSPENSION      COLONOSCOPY N/A 2019    COLONOSCOPY POLYPECTOMY SNARE/COLD BIOPSY performed by Eros Blount MD at [facility] ENDOSCOPY    COLPOSCOPY      DILATION AND CURETTAGE OF UTERUS      TONSILLECTOMY      UPPER GASTROINTESTINAL ENDOSCOPY N/A 2019    EGD BIOPSY performed by Eros Blount MD at [facility] ENDOSCOPY     Medications Prior to Admission:   No current facility-administered medications on file prior to encounter.     Current Outpatient Medications on File Prior to Encounter   Medication Sig Dispense Refill    hydrOXYzine (ATARAX) 25 MG tablet       amitriptyline (ELAVIL) 25 MG tablet nightly      famotidine (PEPCID) 20 MG tablet daily      gabapentin (NEURONTIN) 300 MG capsule at bedtime.          Allergies:  Sulfa antibiotics      Social History:   Social History     Tobacco Use    Smoking status:

## 2025-04-11 NOTE — ANESTHESIA PRE PROCEDURE
(+) blood dyscrasia::., electrolyte abnormalities.                 Abdominal: normal exam            Vascular: negative vascular ROS.         Other Findings:       Anesthesia Plan      MAC     ASA 2       Induction: intravenous.      Anesthetic plan and risks discussed with patient.    Use of blood products discussed with patient whom.    Plan discussed with CRNA.                Rodolfo Pineda DO   4/11/2025

## 2025-04-11 NOTE — DISCHARGE INSTRUCTIONS
ANESTHESIA DISCHARGE INSTRUCTIONS    Wear your seatbelt home.  You are under the influence of drugs-do not drink alcohol, drive, operate machinery, make any important decisions or sign any legal documents for 24 hours.  Children should not ride bikes, skate boards or play on gym sets for 24 hours after surgery.  A responsible adult needs to be with you for 24 hours.  You may experience lightheadedness, dizziness, or sleepiness following surgery.  Rest at home today- increase activity as tolerated.  Progress slowly to a regular diet unless your physician has instructed you otherwise. Drink plenty of water.  If persistent nausea and vomiting becomes a problem, call your physician.  Coughing, sore throat and muscle aches are other side effects of anesthesia, and should improve with time.  Do not drive or operate machinery while taking narcotics.  Females of childbearing potential and on hormonal birth control, should use two forms of contraception following procedure if given a medication called sugammadex and/or emend. Additional contraception should be used for 7 days for sugammadex and/or 28 days for emend. These medications have a potential to reduce the effectiveness of hormonal birth control.      ENDOSCOPY DISCHARGE INSTRUCTIONS    You may experience some lightheadedness for the next several hours.  Plan on quiet relaxation for the rest of today.  A responsible adult needs to stay with you today.  Because of the medications you received today-do not drive,operate machinery,or sign any contractual agreement for the next 24 hours.  Do not drink any alcoholic beverages or take any unprescribed medications tonight.  Eat bland food and avoid anything greasy or spicy initially-progress to your normal diet gradually.  Diet restrictions as instructed.  You may resume home medications as instructed.  It is not unusual to experience some mild cramping or gas pains, and you may not have a bowel movement for several

## 2025-04-12 NOTE — TELEPHONE ENCOUNTER
I was able to speak with the patient and her  about her MRI results.  We discussed conservative treatment for her condition including the utilization of crutches to be partial weightbearing due to the subchondral edema.  The patient states that most of her pain is to the lateral joint line not the medial where most of her pathology resides.  Currently unable to pull up the imaging on my computer to review it myself but I can visualize the MRI report.  The patient was instructed to follow-up with my attending physician Dr. Simba Hutchins MD when he is back in the office on 04/22/2025.  We can further review the imaging and discuss coordination of care.  The patient would like to avoid surgery and we can attempt to utilize conservative treatment.  However informed her that her joint is fairly worn out in the patellofemoral and medial compartment and we have had limited success with treating her left knee with conservative treatment.  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  I am happy to fill out any kind of FMLA or other paperwork to assist the patient moving forward.    All the patient's questions were answered and she agrees with this current plan.    Carlos Peterson PA-C, AT    Physician Assistant - Certified  Orthopedic Surgery   [facility]    04/12/25 12:56 PM

## 2025-04-14 ENCOUNTER — TELEPHONE (OUTPATIENT)
Dept: ORTHOPEDIC SURGERY | Age: 50
End: 2025-04-14

## 2025-04-14 NOTE — TELEPHONE ENCOUNTER
----- Message from Claire NICOLE sent at 4/14/2025  9:46 AM EDT -----  Regarding: APPT WITH DR CH  Specialist Appointment Request - New    Referral on File?: Yes/No: Yes    What Specialty? Select Speciality: Orthopedics     Reason for referral or appointment? YANCY WANTS THE PATIENT TO FU WITH DR CH    Scheduling Preference per Patient: ASAP    --------------------------------------------------------------------------------------------------------------------------    Relationship to Patient:SELF    Call Back Information: OK to leave message on voicemail  Preferred Call Back Number: Phone  [phone]

## 2025-04-15 ENCOUNTER — TELEPHONE (OUTPATIENT)
Dept: ORTHOPEDIC SURGERY | Age: 50
End: 2025-04-15

## 2025-04-15 NOTE — TELEPHONE ENCOUNTER
Faxed completed fmla to [facility] at [phone]    LR#  [phone]   The patient is a 76y Female complaining of fall.

## 2025-04-22 ENCOUNTER — TELEPHONE (OUTPATIENT)
Dept: ORTHOPEDIC SURGERY | Age: 50
End: 2025-04-22

## 2025-04-22 ENCOUNTER — OFFICE VISIT (OUTPATIENT)
Dept: ORTHOPEDIC SURGERY | Age: 50
End: 2025-04-22

## 2025-04-22 VITALS — WEIGHT: 150 LBS | HEIGHT: 63 IN | BODY MASS INDEX: 26.58 KG/M2

## 2025-04-22 DIAGNOSIS — S72.491A CLOSED OSTEOCHONDRAL FRACTURE OF DISTAL END OF RIGHT FEMUR, INITIAL ENCOUNTER (HCC): ICD-10-CM

## 2025-04-22 DIAGNOSIS — S83.241A ACUTE MEDIAL MENISCUS TEAR OF RIGHT KNEE, INITIAL ENCOUNTER: Primary | ICD-10-CM

## 2025-04-22 NOTE — PROGRESS NOTES
Date:  2025    Name:  Subha Buckner  Address:  [address] [place] [address]   [facility] [address]    :  1975      Age:   50 y.o.        Chief Complaint    Knee Pain (Right )      History of Present Illness:  Subha Buckner is a 50 y.o. female who presents for evaluation of right knee pain.  Patient reports a fall about 3 months ago onto the right knee.  She works as a nurse at [employer] and try to manage symptoms in order to keep working.  However her knee pain worsened and she was seen on 25 by Andrei Bojorquez PA-C who ordered an MRI.  She reports pain with ambulation especially at extension and deeper flexion along the lateral aspect of the knee extending down to the lateral lower leg and ankle.      Of note, she has a history of left knee ACL reconstruction and was seen by in our clinic most recently on 10/3/2024 for management of left lower extremity CRPS.  She is currently on amitriptyline and gabapentin once nightly and reports fairly well-controlled symptoms with occasional flares.    The patient denies any catching, giving way, joint locking, numbness, paresthesias, or weakness.        Pain Assessment  Location of Pain: Knee  Location Modifiers: Right  Severity of Pain: 4  Quality of Pain: Sharp, Dull, Aching  Duration of Pain: Persistent  Frequency of Pain: Constant  Aggravating Factors: Walking, Squatting  Limiting Behavior: Yes  Relieving Factors: Rest  Result of Injury: Yes  Work-Related Injury: No  Are there other pain locations you wish to document?: No    Past Medical History:   Diagnosis Date    Anesthesia     HR has dropped after surgeries in the past    Anxiety     Depression     Disease of blood and blood forming organ     Liden factor V    Factor 5 Leiden mutation, heterozygous     Hashimoto's disease     no meds    Postpartum depression     no meds    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, not pregnant     Thyroid disease     Auto-immune Hashimotos- no

## 2025-04-23 ENCOUNTER — TELEPHONE (OUTPATIENT)
Dept: ORTHOPEDIC SURGERY | Age: 50
End: 2025-04-23

## 2025-04-24 ENCOUNTER — HOSPITAL ENCOUNTER (OUTPATIENT)
Age: 50
Discharge: HOME | End: 2025-04-24
Payer: COMMERCIAL

## 2025-04-24 DIAGNOSIS — S83.241A ACUTE MEDIAL MENISCUS TEAR OF RIGHT KNEE, INITIAL ENCOUNTER: ICD-10-CM

## 2025-04-24 DIAGNOSIS — S72.491A CLOSED OSTEOCHONDRAL FRACTURE OF DISTAL END OF RIGHT FEMUR, INITIAL ENCOUNTER (HCC): ICD-10-CM

## 2025-04-24 LAB — 25(OH)D3 SERPL-MCNC: 58.4 NG/ML

## 2025-04-24 PROCEDURE — 82306 VITAMIN D 25 HYDROXY: CPT

## 2025-04-24 PROCEDURE — 36415 COLL VENOUS BLD VENIPUNCTURE: CPT

## 2025-05-05 ENCOUNTER — HOSPITAL ENCOUNTER (OUTPATIENT)
Dept: PHYSICAL THERAPY | Age: 50
Setting detail: THERAPIES SERIES
Discharge: HOME | End: 2025-05-05
Payer: COMMERCIAL

## 2025-05-05 DIAGNOSIS — R53.1 WEAKNESS: ICD-10-CM

## 2025-05-05 DIAGNOSIS — M25.561 RIGHT KNEE PAIN, UNSPECIFIED CHRONICITY: Primary | ICD-10-CM

## 2025-05-05 PROCEDURE — 97110 THERAPEUTIC EXERCISES: CPT | Performed by: PHYSICAL THERAPIST

## 2025-05-05 PROCEDURE — 97161 PT EVAL LOW COMPLEX 20 MIN: CPT | Performed by: PHYSICAL THERAPIST

## 2025-05-05 NOTE — PLAN OF CARE
found upon examination of body systems using standardized tests and measures addressing any of the following: body structures, functions (impairments), activity limitations, and/or participation restrictions  [x] A clinical presentation with stable and/or uncomplicated characteristics   [x] Clinical decision making of LOW (65937 - Typically 20 minutes face-to-face) complexity using standardized patient assessment instrument and/or measurable assessment of functional outcome.    Today's Assessment: See above    Medical Necessity Documentation:  I certify that this patient meets the below criteria necessary for medical necessity for care and/or justification of therapy services:  The patient has functional impairments and/or activity limitations and would benefit from continued outpatient therapy services to address the deficits outlined in the patients goals  The patient has a musculoskeletal condition(s) with a corresponding ICD-10 code that is of complexity and severity that require skilled therapeutic intervention. This has a direct and significant impact on the need for therapy and significantly impacts the rate of recovery.   The patient has associated co-morbidities along with primary diagnosis which significantly impact the rate of recovery and contribute to complexities that require skilled therapeutic intervention    Return to Play: Stage 1: Intro to Strength    Prognosis for POC: [x] Good [] Fair  [] Poor    Patient requires continued skilled intervention: [x] Yes  [] No      CHARGE CAPTURE     PT CHARGE GRID   CPT Code (TIMED) minutes # CPT Code (UNTIMED) #     Therex (49139)  20 1  EVAL:LOW (56046 - Typically 20 minutes face-to-face) 1    Neuromusc. Re-ed (55722)    Re-Eval (79981)     Manual (53344)    Estim Unattended (18819)     Ther. Act (83625)    Mech. Traction (45033)     Gait (60149)    Dry Needle 1-2 muscle (20560)     Aquatic Therex (17673)    Dry Needle 3+ muscle (20561)     Iontophoresis

## 2025-05-20 ENCOUNTER — HOSPITAL ENCOUNTER (OUTPATIENT)
Dept: PHYSICAL THERAPY | Age: 50
Setting detail: THERAPIES SERIES
Discharge: HOME | End: 2025-05-20
Payer: COMMERCIAL

## 2025-05-20 ENCOUNTER — OFFICE VISIT (OUTPATIENT)
Dept: ORTHOPEDIC SURGERY | Age: 50
End: 2025-05-20

## 2025-05-20 DIAGNOSIS — G57.72 COMPLEX REGIONAL PAIN SYNDROME TYPE 2 OF LEFT LOWER EXTREMITY: ICD-10-CM

## 2025-05-20 DIAGNOSIS — M25.562 CHRONIC PAIN OF LEFT KNEE: ICD-10-CM

## 2025-05-20 DIAGNOSIS — G89.29 CHRONIC PAIN OF LEFT KNEE: ICD-10-CM

## 2025-05-20 DIAGNOSIS — S72.491A CLOSED OSTEOCHONDRAL FRACTURE OF DISTAL END OF RIGHT FEMUR, INITIAL ENCOUNTER (HCC): Primary | ICD-10-CM

## 2025-05-20 DIAGNOSIS — M17.12 PRIMARY OSTEOARTHRITIS OF LEFT KNEE: ICD-10-CM

## 2025-05-20 PROCEDURE — 97112 NEUROMUSCULAR REEDUCATION: CPT | Performed by: PHYSICAL THERAPIST

## 2025-05-20 PROCEDURE — 97110 THERAPEUTIC EXERCISES: CPT | Performed by: PHYSICAL THERAPIST

## 2025-05-20 NOTE — FLOWSHEET NOTE
ROM performed to prevent loss of range of motion, maintain or improve muscular strength or increase flexibility, following either an injury or surgery.   (89041) NEUROMUSCULAR RE-EDUCATION - Provided therapeutic procedure on activities related to neuromuscular reeducation of movement, balance, coordination, kinesthetic sense, posture, and/or proprioception for sitting and/or standing activities. Provided HEP review and/or progression.    GOALS     Patient stated goal: Return to PLOF without restriction.  [] Progressing: [] Met: [] Not Met: [] Adjusted    Therapist goals for Patient:   Short Term Goals: To be achieved in: 2 weeks  1Independent in HEP and progression per patient tolerance, in order to prevent re-injury.   [] Progressing: [] Met: [] Not Met: [] Adjusted  Patient will have a decrease in pain to <4/10 max to facilitate improvement in movement, function, and ADLs as indicated by Functional Deficits.  [] Progressing: [] Met: [] Not Met: [] Adjusted    IF APPLICABLE:  [] Patient to demonstrate independence in wear and care for custom orthotic device. (Only if applicable for orthotic eval)     Long Term Goals: To be achieved in: 12 weeks  Disability index score of 40% or less for the LEFS to assist with reaching prior level of function with activities such as walking a mile.  [] Progressing: [] Met: [] Not Met: [] Adjusted  Patient will demonstrate increased AROM of knee ext to 0 deg without pain to allow for proper joint functioning to enable patient to walk with normal gait mechanics.   [] Progressing: [] Met: [] Not Met: [] Adjusted  Patient will demonstrate increased Strength of quad to at least 4+/5 throughout without pain to allow for proper functional mobility to enable patient to return to ascend stairs reciprocally without restriction.   [] Progressing: [] Met: [] Not Met: [] Adjusted  Patient will return to work without increased symptoms or restriction.   [] Progressing: [] Met: [] Not Met: []

## 2025-05-27 ENCOUNTER — HOSPITAL ENCOUNTER (OUTPATIENT)
Dept: PHYSICAL THERAPY | Age: 50
Setting detail: THERAPIES SERIES
Discharge: HOME | End: 2025-05-27
Payer: COMMERCIAL

## 2025-05-27 PROCEDURE — 97112 NEUROMUSCULAR REEDUCATION: CPT | Performed by: PHYSICAL THERAPIST

## 2025-05-27 PROCEDURE — 97110 THERAPEUTIC EXERCISES: CPT | Performed by: PHYSICAL THERAPIST

## 2025-05-27 NOTE — FLOWSHEET NOTE
[facility] - Outpatient Rehabilitation and Therapy: [address], [place] [address] office: [phone] fax: [phone]         Physical Therapy: TREATMENT/PROGRESS NOTE   Patient: Subha Buckner (50 y.o. female)   Examination Date: 2025   :  1975 MRN: [id number]   Visit #: 3   Insurance Allowable Auth Needed   [place], ded met; 85/15; estimates provided (~$26/visit) []Yes    [x]No    Insurance: Payor: [facility] / Plan: [employer] EM[employer]YEES / Product Type: *No Product type* /   Insurance ID: [id number] - (Commercial)  Secondary Insurance (if applicable):    Treatment Diagnosis:     ICD-10-CM    1. Right knee pain, unspecified chronicity  M25.561       2. Weakness  R53.1          Medical Diagnosis:  Acute medial meniscus tear of right knee, initial encounter [S83.241A]  Closed osteochondral fracture of distal end of right femur, initial encounter ([facility]) [S72.491A]   Referring Physician: Simba Hutchins MD  PCP: Prasanth Quick MD     Plan of care signed (Y/N): pending    Date of Patient follow up with Physician: 25     Plan of Care Report: NO  POC update due: (10 visits /OR AUTH LIMITS, whichever is less)  2025                                             Medical History:  Comorbidities:  Other: Liden factor V, Hashimoto's; depression, anxiety  Relevant Medical History: L ACL Reconstruction with subsequent CRPS                                          Precautions/ Contra-indications:           Latex allergy:  NO  Pacemaker:    NO  Contraindications for Manipulation: None  Date of Surgery: N/A; injured in 2025  Other:    Red Flags:  None    Suicide Screening:   The patient did not verbalize a primary behavioral concern, suicidal ideation, suicidal intent, or demonstrate suicidal behaviors.    Preferred Language for Healthcare:   [x] English       [] other:    SUBJECTIVE EXAMINATION     Patient stated complaint:  Reports that her knee is doing okay at this time.

## 2025-05-28 ENCOUNTER — OFFICE VISIT (OUTPATIENT)
Dept: UROGYNECOLOGY | Age: 50
End: 2025-05-28
Payer: COMMERCIAL

## 2025-05-28 VITALS
HEART RATE: 69 BPM | RESPIRATION RATE: 16 BRPM | TEMPERATURE: 98.2 F | SYSTOLIC BLOOD PRESSURE: 112 MMHG | DIASTOLIC BLOOD PRESSURE: 56 MMHG | OXYGEN SATURATION: 100 %

## 2025-05-28 DIAGNOSIS — N94.819 VULVODYNIA: Primary | ICD-10-CM

## 2025-05-28 DIAGNOSIS — M62.838 LEVATOR SPASM: ICD-10-CM

## 2025-05-28 PROCEDURE — 99213 OFFICE O/P EST LOW 20 MIN: CPT | Performed by: NURSE PRACTITIONER

## 2025-05-28 NOTE — PROGRESS NOTES
POLYPECTOMY SNARE/COLD BIOPSY performed by Eros Blount MD at [facility] ENDOSCOPY    COLONOSCOPY N/A 4/11/2025    COLONOSCOPY DIAGNOSTIC performed by Andrei Haas MD at [facility]    COLPOSCOPY      DILATION AND CURETTAGE OF UTERUS      TONSILLECTOMY      UPPER GASTROINTESTINAL ENDOSCOPY N/A 11/26/2019    EGD BIOPSY performed by Eros Blount MD at [facility]     Allergies:   Allergies   Allergen Reactions    Sulfa Antibiotics Rash     Current Medications:  Current Outpatient Medications   Medication Sig Dispense Refill    hydrOXYzine (ATARAX) 25 MG tablet       amitriptyline (ELAVIL) 25 MG tablet nightly      famotidine (PEPCID) 20 MG tablet daily      gabapentin (NEURONTIN) 300 MG capsule at bedtime.       No current facility-administered medications for this visit.     Social History:   Social History     Socioeconomic History    Marital status:      Spouse name: Not on file    Number of children: Not on file    Years of education: Not on file    Highest education level: Not on file   Occupational History    Not on file   Tobacco Use    Smoking status: Never    Smokeless tobacco: Never   Vaping Use    Vaping status: Never Used   Substance and Sexual Activity    Alcohol use: Not Currently     Alcohol/week: 1.0 standard drink of alcohol     Types: 1 Glasses of wine per week     Comment: 1 monthly    Drug use: No    Sexual activity: Yes     Partners: Male   Other Topics Concern    Not on file   Social History Narrative    Not on file     Social Drivers of Health     Financial Resource Strain: Not on file   Food Insecurity: Unknown (1/22/2024)    [facility]    Food Insecurities     Worried about running out of food: Not on file     Food Bought: Not on file   Transportation Needs: Unknown (1/22/2024)    Received from [facility]    Transportation     Worried about transportation: Not on file   Physical Activity:

## 2025-05-31 NOTE — PROGRESS NOTES
Date:  2025    Name:  Subha Buckner  Address:  [address] [place] [address] [address] [facility] [address]    :  1975      Age:   50 y.o.        Chief Complaint    Follow-up (Follow up right knee pain)      History of Present Illness:  Subha Buckner is a 50 y.o. female who presents for evaluation of right knee pain.  To recap, patient reports a fall about 3 months ago onto the right knee.  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  However her knee pain worsened and she was seen on 25 by Andrei Bojorquez PA-C who ordered an MRI.  She reports pain with ambulation especially at extension and deeper flexion along the lateral aspect of the knee extending down to the lateral lower leg and ankle.      Of note, she has a history of left knee ACL reconstruction and was seen by in our clinic most recently on 10/3/2024 for management of left lower extremity CRPS.  She is currently on amitriptyline and gabapentin once nightly and reports fairly well-controlled symptoms with occasional flares.      Since her last visit she feels that her pain has dramatically improved.  She has noticed increased range of motion with limited discomfort.  She has bands appropriately with physical therapy.  The patient has been adhering to our instructions with regards to her knee brace, crutches and only 25% weightbearing.  Currently she rates her discomfort 4/10 and describes as aching, dull, and occasionally sharp.  It is worse with weightbearing and prolonged waiting.  Conservative treatment has included: Rest, ice, activity modification, hinged knee brace, partial weightbearing with crutches, physical therapy and a home exercise program.    The patient denies any catching, giving way, joint locking, numbness, paresthesias, or weakness.             Past Medical History:   Diagnosis Date    Anesthesia     HR has dropped after surgeries in the past    Anxiety     Depression

## 2025-06-02 ENCOUNTER — HOSPITAL ENCOUNTER (OUTPATIENT)
Dept: PHYSICAL THERAPY | Age: 50
Setting detail: THERAPIES SERIES
Discharge: HOME | End: 2025-06-02
Payer: COMMERCIAL

## 2025-06-02 ENCOUNTER — TELEPHONE (OUTPATIENT)
Dept: ORTHOPEDIC SURGERY | Age: 50
End: 2025-06-02

## 2025-06-02 PROCEDURE — 97110 THERAPEUTIC EXERCISES: CPT | Performed by: PHYSICAL THERAPIST

## 2025-06-02 PROCEDURE — 97112 NEUROMUSCULAR REEDUCATION: CPT | Performed by: PHYSICAL THERAPIST

## 2025-06-02 NOTE — FLOWSHEET NOTE
enable patient to return to ascend stairs reciprocally without restriction.   [] Progressing: [] Met: [] Not Met: [] Adjusted  Patient will return to work without increased symptoms or restriction.   [] Progressing: [] Met: [] Not Met: [] Adjusted  Patient will be able to sleep for 6 hours uninterrupted by knee pain. (patient specific functional goal)    [] Progressing: [] Met: [] Not Met: [] Adjusted       Overall Progression Towards Functional goals/ Treatment Progress Update:  [] Patient is progressing as expected towards functional goals listed.    [] Progression is slowed due to complexities/Impairments listed.  [] Progression has been slowed due to co-morbidities.  [x] Plan just implemented, too soon (<30days) to assess goals progression   [] Goals require adjustment due to lack of progress  [] Patient is not progressing as expected and requires additional follow up with physician  [] Other:     TREATMENT PLAN     Frequency/Duration: 1-2x/week for  12  weeks for the following treatment interventions:    Interventions:  Therapeutic Exercise (53856) including: strength training, ROM, and functional mobility  Therapeutic Activities (21490) including: functional mobility training and education.  Neuromuscular Re-education (78236) activation and proprioception, including postural re-education.    Manual Therapy (56060) as indicated to include: Passive Range of Motion, Gr I-IV mobilizations, Soft Tissue Mobilization, and Dry Needling/IASTM  Modalities as needed that may include: Cryotherapy, Electrical Stimulation, Thermal Agents, and Vasoneumatic Compression  Patient education on joint protection, activity modification, and progression of HEP    Plan: POC initiated as per evaluation    Electronically Signed by Viktor Aguirre PT  Date: 06/02/2025     Note: Portions of this note have been templated and/or copied from initial evaluation, reassessments and prior notes for documentation efficiency.    Note: If patient

## 2025-06-10 ENCOUNTER — TELEPHONE (OUTPATIENT)
Dept: ORTHOPEDIC SURGERY | Age: 50
End: 2025-06-10

## 2025-06-17 ENCOUNTER — TELEPHONE (OUTPATIENT)
Dept: ORTHOPEDIC SURGERY | Age: 50
End: 2025-06-17

## 2025-06-17 ENCOUNTER — HOSPITAL ENCOUNTER (OUTPATIENT)
Dept: PHYSICAL THERAPY | Age: 50
Setting detail: THERAPIES SERIES
End: 2025-06-17
Payer: COMMERCIAL

## 2025-06-17 ENCOUNTER — OFFICE VISIT (OUTPATIENT)
Dept: ORTHOPEDIC SURGERY | Age: 50
End: 2025-06-17

## 2025-06-17 DIAGNOSIS — S72.491A CLOSED OSTEOCHONDRAL FRACTURE OF DISTAL END OF RIGHT FEMUR, INITIAL ENCOUNTER (HCC): Primary | ICD-10-CM

## 2025-06-17 DIAGNOSIS — G89.29 CHRONIC PAIN OF LEFT KNEE: ICD-10-CM

## 2025-06-17 DIAGNOSIS — S83.241A ACUTE MEDIAL MENISCUS TEAR OF RIGHT KNEE, INITIAL ENCOUNTER: ICD-10-CM

## 2025-06-17 DIAGNOSIS — G57.72 COMPLEX REGIONAL PAIN SYNDROME TYPE 2 OF LEFT LOWER EXTREMITY: ICD-10-CM

## 2025-06-17 DIAGNOSIS — M25.561 ACUTE PAIN OF RIGHT KNEE: ICD-10-CM

## 2025-06-17 DIAGNOSIS — M25.562 CHRONIC PAIN OF LEFT KNEE: ICD-10-CM

## 2025-06-17 DIAGNOSIS — M17.12 PRIMARY OSTEOARTHRITIS OF LEFT KNEE: ICD-10-CM

## 2025-06-17 RX ORDER — METHYLPREDNISOLONE ACETATE 40 MG/ML
80 INJECTION, SUSPENSION INTRA-ARTICULAR; INTRALESIONAL; INTRAMUSCULAR; SOFT TISSUE ONCE
Status: COMPLETED | OUTPATIENT
Start: 2025-06-17 | End: 2025-06-17

## 2025-06-17 RX ORDER — LIDOCAINE HYDROCHLORIDE 10 MG/ML
2 INJECTION, SOLUTION INFILTRATION; PERINEURAL ONCE
Status: COMPLETED | OUTPATIENT
Start: 2025-06-17 | End: 2025-06-17

## 2025-06-17 RX ADMIN — METHYLPREDNISOLONE ACETATE 80 MG: 40 INJECTION, SUSPENSION INTRA-ARTICULAR; INTRALESIONAL; INTRAMUSCULAR; SOFT TISSUE at 13:29

## 2025-06-17 RX ADMIN — LIDOCAINE HYDROCHLORIDE 2 ML: 10 INJECTION, SOLUTION INFILTRATION; PERINEURAL at 13:29

## 2025-06-17 NOTE — PROGRESS NOTES
Date:  2025    Name:  Subha Buckner  Address:  [address] [place] [address]   [place] [facility] [address]    :  1975      Age:   50 y.o.        Chief Complaint    Follow-up (New right knee pain//Return to work date 25)      History of Present Illness:  Subha Buckner is a 50 y.o. female who presents for evaluation of right knee pain.  To recap, patient reports a fall about 3 months ago onto the right knee.  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  However her knee pain worsened and she was seen on 25 by Andrei Bojorquez PA-C who ordered an MRI.  She reports pain with ambulation especially at extension and deeper flexion along the lateral aspect of the knee extending down to the lateral lower leg and ankle.      Of note, she has a history of left knee ACL reconstruction and was seen by in our clinic most recently on 10/3/2024 for management of left lower extremity CRPS.  She is currently on amitriptyline and gabapentin once nightly and reports fairly well-controlled symptoms with occasional flares.    Since her last visit she feels that her pain has dramatically improved.  She has noticed increased range of motion with limited discomfort.  She was advancing appropriately with physical therapy and able to weight-bear to 50%.  After her last visit she was increased to 75% with crutches.  The patient was still utilizing her hinged knee brace.  She went on vacation to the beach and unfortunately has had an increase in her pain.  Admittedly she did not wear her hinged knee brace but attests still utilizing crutches.  She states that she did not walk a great deal in the sand but had to walk through the sand to get to where her family was seated.  She denies any new injury but notes increased pain.          Past Medical History:   Diagnosis Date    Anesthesia     HR has dropped after surgeries in the past    Anxiety     Depression     Disease of blood

## 2025-06-24 ENCOUNTER — TELEPHONE (OUTPATIENT)
Dept: ORTHOPEDIC SURGERY | Age: 50
End: 2025-06-24

## 2025-06-26 ENCOUNTER — TELEPHONE (OUTPATIENT)
Dept: ORTHOPEDIC SURGERY | Age: 50
End: 2025-06-26

## 2025-06-26 ENCOUNTER — OFFICE VISIT (OUTPATIENT)
Dept: ORTHOPEDIC SURGERY | Age: 50
End: 2025-06-26
Payer: COMMERCIAL

## 2025-06-26 DIAGNOSIS — G57.72 COMPLEX REGIONAL PAIN SYNDROME TYPE 2 OF LEFT LOWER EXTREMITY: ICD-10-CM

## 2025-06-26 DIAGNOSIS — G57.73 COMPLEX REGIONAL PAIN SYNDROME TYPE 2 OF BOTH LOWER EXTREMITIES: Primary | ICD-10-CM

## 2025-06-26 DIAGNOSIS — M17.12 PRIMARY OSTEOARTHRITIS OF LEFT KNEE: ICD-10-CM

## 2025-06-26 PROCEDURE — 99214 OFFICE O/P EST MOD 30 MIN: CPT | Performed by: PHYSICIAN ASSISTANT

## 2025-06-26 RX ORDER — PREDNISOLONE 5 MG/1
TABLET ORAL
Qty: 35 TABLET | Refills: 0 | Status: SHIPPED | OUTPATIENT
Start: 2025-06-26 | End: 2025-06-27 | Stop reason: ALTCHOICE

## 2025-06-26 RX ORDER — PREGABALIN 50 MG/1
50 CAPSULE ORAL 2 TIMES DAILY
Qty: 28 CAPSULE | Refills: 0 | Status: SHIPPED | OUTPATIENT
Start: 2025-06-26 | End: 2025-06-27 | Stop reason: ALTCHOICE

## 2025-06-26 NOTE — PROGRESS NOTES
Date:  2025    Name:  Subha Buckner  Address:  [address] [place] [address]   [place] [facility] [address]    :  1975      Age:   50 y.o.        Chief Complaint    Follow-up (Follow up injection, continued knee pain)      History of Present Illness:  Subha Buckner is a 50 y.o. female with a past medical history of CRPS who presents for evaluation of right knee pain.  To recap, patient reports a fall about 3 months ago onto the right knee.  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  However her knee pain worsened and she was seen on 25 by Andrei Bojorquez PA-C who ordered an MRI.  She reports pain with ambulation especially at extension and deeper flexion along the lateral aspect of the knee extending down to the lateral lower leg and ankle.      Of note, she has a history of left knee ACL reconstruction and was seen by in our clinic most recently on 10/3/2024 for management of left lower extremity CRPS.  She is currently on amitriptyline and gabapentin once nightly and reports fairly well-controlled symptoms with occasional flares.    Since her last visit she feels that her pain has dramatically improved.  She has noticed increased range of motion with limited discomfort.  She was advancing appropriately with physical therapy and able to weight-bear to 50%.  After her last visit she was increased to 75% with crutches.  The patient was still utilizing her hinged knee brace.  She went on vacation to the beach and unfortunately has had an increase in her pain.  Admittedly she did not wear her hinged knee brace but attests still utilizing crutches.  She states that she did not walk a great deal in the sand but had to walk through the sand to get to where her family was seated.      She denies any new injury but notes increased pain since her last visit where she received corticosteroid injection.  She feels that she did not get much of any relief.

## 2025-06-27 ENCOUNTER — TELEPHONE (OUTPATIENT)
Dept: ORTHOPEDIC SURGERY | Age: 50
End: 2025-06-27

## 2025-06-27 DIAGNOSIS — G57.73 COMPLEX REGIONAL PAIN SYNDROME TYPE 2 OF BOTH LOWER EXTREMITIES: Primary | ICD-10-CM

## 2025-06-27 RX ORDER — PREGABALIN 25 MG/1
25 CAPSULE ORAL 2 TIMES DAILY
Qty: 28 CAPSULE | Refills: 0 | Status: SHIPPED | OUTPATIENT
Start: 2025-06-27 | End: 2025-07-11

## 2025-06-27 RX ORDER — PREDNISONE 5 MG/1
TABLET ORAL
Qty: 35 TABLET | Refills: 0 | Status: SHIPPED | OUTPATIENT
Start: 2025-06-27 | End: 2025-07-18

## 2025-06-27 NOTE — TELEPHONE ENCOUNTER
Patient has three questions.     Should she continue her therapy?     Lyrica was 50 mg instead of 25 mg on her rx from yesterday.    The prednisone is not covered by her insurance. Can she have something different.

## 2025-07-01 ENCOUNTER — HOSPITAL ENCOUNTER (OUTPATIENT)
Dept: PHYSICAL THERAPY | Age: 50
Setting detail: THERAPIES SERIES
Discharge: HOME | End: 2025-07-01
Payer: COMMERCIAL

## 2025-07-01 ENCOUNTER — TELEPHONE (OUTPATIENT)
Dept: ORTHOPEDIC SURGERY | Age: 50
End: 2025-07-01

## 2025-07-01 PROCEDURE — 97112 NEUROMUSCULAR REEDUCATION: CPT | Performed by: PHYSICAL THERAPIST

## 2025-07-01 PROCEDURE — 97110 THERAPEUTIC EXERCISES: CPT | Performed by: PHYSICAL THERAPIST

## 2025-07-01 NOTE — PLAN OF CARE
[facility] - Outpatient Rehabilitation and Therapy: [address], [place] [address] office: [phone] fax: [phone]       Physical Therapy Re-Certification Plan of Care    Dear Simba Hutchins MD  ,    We had the pleasure of treating the following patient for physical therapy services at [facility] Outpatient Physical Therapy. A summary of our findings can be found in the updated assessment below.  This includes our plan of care.  If you have any questions or concerns regarding these findings, please do not hesitate to contact me at the office phone number checked above.  Thank you for the referral.     Physician Signature:________________________________Date:__________________  By signing above (or electronic signature), therapist's plan is approved by physician      Total Visits: 5     Overall Response to Treatment:  Patient continues to experience significant pain, WB restrictions and functional/objective measurements.  Recommend continues PT 1-2x/week for 6 weeks    Recommendation:    [x] Continue PT 1-2x / wk for 6 weeks.   [] Hold PT, pending MD visit   [] Discharge to [facility]. Follow up with PT or MD PRN.     Physical Therapy: TREATMENT/PROGRESS NOTE   Patient: Subha Buckner (50 y.o. female)   Examination Date: 2025   :  1975 MRN: [id number]   Visit #: 5   Insurance Allowable Auth Needed   [place], ded met; 85/15; estimates provided (~$26/visit) []Yes    [x]No    Insurance: Payor: [facility] / Plan: [facility] EMPLOYEES / Product Type: *No Product type* /   Insurance ID: [id number] - (Commercial)  Secondary Insurance (if applicable):    Treatment Diagnosis:     ICD-10-CM    1. Right knee pain, unspecified chronicity  M25.561       2. Weakness  R53.1          Medical Diagnosis:  Acute medial meniscus tear of right knee, initial encounter [S83.241A]  Closed osteochondral fracture of distal end of right femur, initial encounter ([facility]) [S72.451A]   Referring Physician: Liset

## 2025-07-09 ENCOUNTER — HOSPITAL ENCOUNTER (OUTPATIENT)
Dept: PHYSICAL THERAPY | Age: 50
Setting detail: THERAPIES SERIES
Discharge: HOME | End: 2025-07-09
Payer: COMMERCIAL

## 2025-07-09 PROCEDURE — 97112 NEUROMUSCULAR REEDUCATION: CPT | Performed by: PHYSICAL THERAPIST

## 2025-07-09 PROCEDURE — 97110 THERAPEUTIC EXERCISES: CPT | Performed by: PHYSICAL THERAPIST

## 2025-07-09 NOTE — FLOWSHEET NOTE
[facility] - Outpatient Rehabilitation and Therapy: [address], [place] [address] office: [phone] fax: [phone]       Physical Therapy: TREATMENT/PROGRESS NOTE   Patient: Subha Buckner (50 y.o. female)   Examination Date: 2025   :  1975 MRN: [id number]   Visit #: 6   Insurance Allowable Auth Needed   [place], ded met; 85/15; estimates provided (~$26/visit) []Yes    [x]No    Insurance: Payor: [facility] / Plan: [employer] [facility] EMPLOYEES / Product Type: *No Product type* /   Insurance ID: [id number] - (Commercial)  Secondary Insurance (if applicable):    Treatment Diagnosis:     ICD-10-CM    1. Right knee pain, unspecified chronicity  M25.561       2. Weakness  R53.1          Medical Diagnosis:  Acute medial meniscus tear of right knee, initial encounter [S83.241A]  Closed osteochondral fracture of distal end of right femur, initial encounter ([facility]) [S72.491A]   Referring Physician: Simba Hutchins MD  PCP: Prasanth Quick MD     Plan of care signed (Y/N): pending    Date of Patient follow up with Physician: 25     Plan of Care Report: NO  POC update due: (10 visits /OR AUTH LIMITS, whichever is less)  2025                                             Medical History:  Comorbidities:  Other: Liden factor V, Hashimoto's; depression, anxiety  Relevant Medical History: L ACL Reconstruction with subsequent CRPS                                          Precautions/ Contra-indications:           Latex allergy:  NO  Pacemaker:    NO  Contraindications for Manipulation: None  Date of Surgery: N/A; injured in 2025  Other:    Red Flags:  None    Suicide Screening:   The patient did not verbalize a primary behavioral concern, suicidal ideation, suicidal intent, or demonstrate suicidal behaviors.    Preferred Language for Healthcare:   [x] English       [] other:    SUBJECTIVE EXAMINATION     Patient stated complaint:  Pt reports that her legs feel really weak. She

## 2025-07-14 DIAGNOSIS — G89.29 CHRONIC PAIN OF LEFT KNEE: ICD-10-CM

## 2025-07-14 DIAGNOSIS — S83.241A ACUTE MEDIAL MENISCUS TEAR OF RIGHT KNEE, INITIAL ENCOUNTER: ICD-10-CM

## 2025-07-14 DIAGNOSIS — M25.562 CHRONIC PAIN OF LEFT KNEE: ICD-10-CM

## 2025-07-14 DIAGNOSIS — M17.12 PRIMARY OSTEOARTHRITIS OF LEFT KNEE: ICD-10-CM

## 2025-07-14 DIAGNOSIS — G57.72 COMPLEX REGIONAL PAIN SYNDROME TYPE 2 OF LEFT LOWER EXTREMITY: Primary | ICD-10-CM

## 2025-07-14 DIAGNOSIS — M25.561 ACUTE PAIN OF RIGHT KNEE: ICD-10-CM

## 2025-07-14 DIAGNOSIS — G57.73 COMPLEX REGIONAL PAIN SYNDROME TYPE 2 OF BOTH LOWER EXTREMITIES: ICD-10-CM

## 2025-07-15 ENCOUNTER — HOSPITAL ENCOUNTER (OUTPATIENT)
Dept: PHYSICAL THERAPY | Age: 50
Setting detail: THERAPIES SERIES
Discharge: HOME | End: 2025-07-15
Payer: COMMERCIAL

## 2025-07-15 ENCOUNTER — TELEPHONE (OUTPATIENT)
Dept: ORTHOPEDIC SURGERY | Age: 50
End: 2025-07-15

## 2025-07-15 PROCEDURE — 97530 THERAPEUTIC ACTIVITIES: CPT | Performed by: PHYSICAL THERAPIST

## 2025-07-15 PROCEDURE — 97110 THERAPEUTIC EXERCISES: CPT | Performed by: PHYSICAL THERAPIST

## 2025-07-15 NOTE — FLOWSHEET NOTE
[facility] - Outpatient Rehabilitation and Therapy: [address], [place] [address] office: [phone] fax: [phone]       Physical Therapy: TREATMENT/PROGRESS NOTE   Patient: Subha Buckner (50 y.o. female)   Examination Date: 07/15/2025   :  1975 MRN: [id number]   Visit #: 7   Insurance Allowable Auth Needed   [place], ded met; 85/15; estimates provided (~$26/visit) []Yes    [x]No    Insurance: Payor: [facility] / Plan: [employer] EMPLO[employer]ES / Product Type: *No Product type* /   Insurance ID: [id number] - (Commercial)  Secondary Insurance (if applicable):    Treatment Diagnosis:     ICD-10-CM    1. Right knee pain, unspecified chronicity  M25.561       2. Weakness  R53.1          Medical Diagnosis:  Acute medial meniscus tear of right knee, initial encounter [S83.241A]  Closed osteochondral fracture of distal end of right femur, initial encounter ([facility]) [S72.491A]   Referring Physician: Simba Hutchins MD  PCP: Prasanth Quick MD     Plan of care signed (Y/N): pending    Date of Patient follow up with Physician: 25     Plan of Care Report: NO  POC update due: (10 visits /OR AUTH LIMITS, whichever is less)  2025                                             Medical History:  Comorbidities:  Other: Liden factor V, Hashimoto's; depression, anxiety  Relevant Medical History: L ACL Reconstruction with subsequent CRPS                                          Precautions/ Contra-indications:           Latex allergy:  NO  Pacemaker:    NO  Contraindications for Manipulation: None  Date of Surgery: N/A; injured in 2025  Other:    Red Flags:  None    Suicide Screening:   The patient did not verbalize a primary behavioral concern, suicidal ideation, suicidal intent, or demonstrate suicidal behaviors.    Preferred Language for Healthcare:   [x] English       [] other:    SUBJECTIVE EXAMINATION     Patient stated complaint:  Pt reports inc symptoms since yesterday. Tried to do

## 2025-07-21 ENCOUNTER — HOSPITAL ENCOUNTER (OUTPATIENT)
Age: 50
Discharge: HOME | End: 2025-07-21
Payer: COMMERCIAL

## 2025-07-21 VITALS
DIASTOLIC BLOOD PRESSURE: 70 MMHG | OXYGEN SATURATION: 100 % | HEART RATE: 74 BPM | SYSTOLIC BLOOD PRESSURE: 108 MMHG | RESPIRATION RATE: 12 BRPM

## 2025-07-21 DIAGNOSIS — G57.73 COMPLEX REGIONAL PAIN SYNDROME TYPE 2 OF BOTH LOWER EXTREMITIES: ICD-10-CM

## 2025-07-21 PROCEDURE — 2580000003 HC RX 258: Performed by: RADIOLOGY

## 2025-07-21 PROCEDURE — 6370000000 HC RX 637 (ALT 250 FOR IP): Performed by: RADIOLOGY

## 2025-07-21 PROCEDURE — 2709999900 CT GUIDED NEEDLE PLACEMENT

## 2025-07-21 PROCEDURE — 6360000002 HC RX W HCPCS

## 2025-07-21 PROCEDURE — 6360000002 HC RX W HCPCS: Performed by: RADIOLOGY

## 2025-07-21 PROCEDURE — 6360000004 HC RX CONTRAST MEDICATION: Performed by: RADIOLOGY

## 2025-07-21 RX ORDER — 0.9 % SODIUM CHLORIDE 0.9 %
250 INTRAVENOUS SOLUTION INTRAVENOUS ONCE
Status: COMPLETED | OUTPATIENT
Start: 2025-07-21 | End: 2025-07-21

## 2025-07-21 RX ORDER — FENTANYL CITRATE 50 UG/ML
INJECTION, SOLUTION INTRAMUSCULAR; INTRAVENOUS
Status: COMPLETED
Start: 2025-07-21 | End: 2025-07-21

## 2025-07-21 RX ORDER — FENTANYL CITRATE 50 UG/ML
INJECTION, SOLUTION INTRAMUSCULAR; INTRAVENOUS PRN
Status: COMPLETED | OUTPATIENT
Start: 2025-07-21 | End: 2025-07-21

## 2025-07-21 RX ORDER — IOPAMIDOL 612 MG/ML
INJECTION, SOLUTION INTRATHECAL PRN
Status: COMPLETED | OUTPATIENT
Start: 2025-07-21 | End: 2025-07-21

## 2025-07-21 RX ORDER — LIDOCAINE HYDROCHLORIDE 10 MG/ML
INJECTION, SOLUTION EPIDURAL; INFILTRATION; INTRACAUDAL; PERINEURAL PRN
Status: COMPLETED | OUTPATIENT
Start: 2025-07-21 | End: 2025-07-21

## 2025-07-21 RX ORDER — BUPIVACAINE HYDROCHLORIDE 5 MG/ML
INJECTION, SOLUTION EPIDURAL; INTRACAUDAL; PERINEURAL PRN
Status: COMPLETED | OUTPATIENT
Start: 2025-07-21 | End: 2025-07-21

## 2025-07-21 RX ORDER — FENTANYL CITRATE 50 UG/ML
50 INJECTION, SOLUTION INTRAMUSCULAR; INTRAVENOUS ONCE
Status: COMPLETED | OUTPATIENT
Start: 2025-07-21 | End: 2025-07-21

## 2025-07-21 RX ORDER — OXYCODONE AND ACETAMINOPHEN 5; 325 MG/1; MG/1
1 TABLET ORAL ONCE
Refills: 0 | Status: COMPLETED | OUTPATIENT
Start: 2025-07-21 | End: 2025-07-21

## 2025-07-21 RX ORDER — KETOROLAC TROMETHAMINE 30 MG/ML
30 INJECTION, SOLUTION INTRAMUSCULAR; INTRAVENOUS ONCE
Status: COMPLETED | OUTPATIENT
Start: 2025-07-21 | End: 2025-07-21

## 2025-07-21 RX ORDER — MIDAZOLAM HYDROCHLORIDE 1 MG/ML
INJECTION, SOLUTION INTRAMUSCULAR; INTRAVENOUS PRN
Status: COMPLETED | OUTPATIENT
Start: 2025-07-21 | End: 2025-07-21

## 2025-07-21 RX ADMIN — IOPAMIDOL 4 ML: 612 INJECTION, SOLUTION INTRATHECAL at 12:07

## 2025-07-21 RX ADMIN — OXYCODONE HYDROCHLORIDE AND ACETAMINOPHEN 1 TABLET: 5; 325 TABLET ORAL at 12:45

## 2025-07-21 RX ADMIN — SODIUM CHLORIDE 250 ML: 0.9 INJECTION, SOLUTION INTRAVENOUS at 11:17

## 2025-07-21 RX ADMIN — KETOROLAC TROMETHAMINE 30 MG: 30 INJECTION, SOLUTION INTRAMUSCULAR; INTRAVENOUS at 14:35

## 2025-07-21 RX ADMIN — FENTANYL CITRATE 50 MCG: 50 INJECTION, SOLUTION INTRAMUSCULAR; INTRAVENOUS at 12:44

## 2025-07-21 RX ADMIN — FENTANYL CITRATE 50 MCG: 50 INJECTION, SOLUTION INTRAMUSCULAR; INTRAVENOUS at 11:55

## 2025-07-21 RX ADMIN — FENTANYL CITRATE 25 MCG: 50 INJECTION, SOLUTION INTRAMUSCULAR; INTRAVENOUS at 12:01

## 2025-07-21 RX ADMIN — LIDOCAINE HYDROCHLORIDE 5 ML: 10 INJECTION, SOLUTION EPIDURAL; INFILTRATION; INTRACAUDAL; PERINEURAL at 11:59

## 2025-07-21 RX ADMIN — MIDAZOLAM HYDROCHLORIDE 1 MG: 1 INJECTION, SOLUTION INTRAMUSCULAR; INTRAVENOUS at 12:01

## 2025-07-21 RX ADMIN — BUPIVACAINE HYDROCHLORIDE 20 ML: 5 INJECTION, SOLUTION EPIDURAL; INTRACAUDAL; PERINEURAL at 12:13

## 2025-07-21 RX ADMIN — MIDAZOLAM HYDROCHLORIDE 1 MG: 1 INJECTION, SOLUTION INTRAMUSCULAR; INTRAVENOUS at 11:55

## 2025-07-21 RX ADMIN — FENTANYL CITRATE 50 MCG: 50 INJECTION INTRAMUSCULAR; INTRAVENOUS at 12:44

## 2025-07-21 RX ADMIN — LIDOCAINE HYDROCHLORIDE 5 ML: 10 INJECTION, SOLUTION EPIDURAL; INFILTRATION; INTRACAUDAL; PERINEURAL at 11:58

## 2025-07-21 ASSESSMENT — PAIN DESCRIPTION - PAIN TYPE: TYPE: CHRONIC PAIN

## 2025-07-21 ASSESSMENT — PAIN DESCRIPTION - LOCATION
LOCATION: GROIN
LOCATION: LEG

## 2025-07-21 ASSESSMENT — PAIN DESCRIPTION - ORIENTATION
ORIENTATION: LEFT
ORIENTATION: RIGHT;LEFT
ORIENTATION: LEFT
ORIENTATION: LEFT

## 2025-07-21 ASSESSMENT — PAIN SCALES - GENERAL
PAINLEVEL_OUTOF10: 3
PAINLEVEL_OUTOF10: 8
PAINLEVEL_OUTOF10: 7
PAINLEVEL_OUTOF10: 5
PAINLEVEL_OUTOF10: 8

## 2025-07-21 ASSESSMENT — PAIN DESCRIPTION - ONSET: ONSET: ON-GOING

## 2025-07-21 ASSESSMENT — PAIN DESCRIPTION - FREQUENCY: FREQUENCY: CONTINUOUS

## 2025-07-21 NOTE — H&P
IR  H & P      Patient:  Subha Buckner   :   1975      INTERVENTIONAL RADIOLOGY: Pre-Sedation Assessment      CC: Complex regional pain syndrome    The procedure including risks, alternatives, and benefits discussed at length with the patient (or designated family member) and all questions were answered.    Informed consent to proceed with the procedure was given.        Hospital H & P reviewed and prior imaging studies noted  No changes needed to prior H & P    Skin and Access site examination:   Heart : regular rate and rhythm  Lungs : clear, breathing easily    Airway Assessment: Mallampati 2    Condition : stable  No acute changes     Melanie Scale:  Activity:  2 - Able to move 4 extremities voluntarily on command  Respiration:  2 - Able to breathe deeply and cough freely  Circulation:  2 - BP+/- 20mmHg of normal  Consciousness:  2 - Fully awake  Oxygen Saturation (color):  2 - Able to maintain oxygen saturation >92% on room air      Allergies:   Allergies   Allergen Reactions    Sulfa Antibiotics Rash       Medications  Current Outpatient Medications on File Prior to Encounter   Medication Sig Dispense Refill    pregabalin (LYRICA) 25 MG capsule Take 1 capsule by mouth 2 times daily for 14 days. Max Daily Amount: 50 mg 28 capsule 0    hydrOXYzine (ATARAX) 25 MG tablet       amitriptyline (ELAVIL) 25 MG tablet nightly      famotidine (PEPCID) 20 MG tablet daily      gabapentin (NEURONTIN) 300 MG capsule at bedtime.       No current facility-administered medications on file prior to encounter.       Sedation : Moderate sedation planned  ASA 1 - Normal health patient  with no risk factors  to preclude sedation    Cleared to proceed with Moderate Sedation

## 2025-07-21 NOTE — PROGRESS NOTES
TRANSFER - OUT REPORT:    Verbal report given to FUAD Oseguera on Subha Buckner being transferred to [facility] for routine progression of patient care       Report consisted of patient's Situation, Background, Assessment and   Recommendations(SBAR).     Information from the following report(s) Nurse Handoff Report, MAR, and Event Log was reviewed with the receiving nurse.    Opportunity for questions and clarification was provided.      Patient transported with:   Monitor  Registered Nurse

## 2025-07-21 NOTE — PRE SEDATION
IR  PRE SEDATION NOTE      Patient:  Subha Buckner   :   1975      INTERVENTIONAL RADIOLOGY: Pre-Sedation Assessment      CC: Complex regional pain syndrome    The procedure including risks, alternatives, and benefits discussed at length with the patient (or designated family member) and all questions were answered.    Informed consent to proceed with the procedure was given.        Prior imaging studies noted    Condition : stable  No acute changes   Skin and Access site examination:   Heart : regular rate and rhythm  Lungs : clear, breathing easily    Airway Assessment: Mallampati 2      Melanie Scale:  Activity:  2 - Able to move 4 extremities voluntarily on command  Respiration:  2 - Able to breathe deeply and cough freely  Circulation:  2 - BP+/- 20mmHg of normal  Consciousness:  2 - Fully awake  Oxygen Saturation (color):  2 - Able to maintain oxygen saturation >92% on room air      Allergies:   Allergies   Allergen Reactions    Sulfa Antibiotics Rash       Medications  Current Outpatient Medications on File Prior to Encounter   Medication Sig Dispense Refill    pregabalin (LYRICA) 25 MG capsule Take 1 capsule by mouth 2 times daily for 14 days. Max Daily Amount: 50 mg 28 capsule 0    hydrOXYzine (ATARAX) 25 MG tablet       amitriptyline (ELAVIL) 25 MG tablet nightly      famotidine (PEPCID) 20 MG tablet daily      gabapentin (NEURONTIN) 300 MG capsule at bedtime.       No current facility-administered medications on file prior to encounter.       Sedation : Moderate sedation planned  ASA 1 - Normal health patient  with no risk factors  to preclude sedation    Cleared to proceed with Moderate Sedation

## 2025-07-21 NOTE — BRIEF OP NOTE
Brief Postoperative Note      Patient: Subha Buckner  YOB: 1975  MRN: [id number]    Date of Procedure: 7/21/2025  Complex regional pain syndrome    Post-Op Diagnosis: Same       CT guided bilateral sympathetic block L2-:3    Dr. Kenton Andrade    Anesthesia: An immediate re-assessment was completed prior to sedation, and it is determined to be safe to proceed.    Estimated Blood Loss (mL): 0.0cc    Complications: None    Specimens:   none    Implants:  * No implants in log *      Findings:  Present At Time Of Surgery (PATOS) (choose all levels that have infection present):  No infection present  Other Findings:  Post procedure left groin pain  manage conservativley    Electronically signed by Kenton Andrade MD on 7/21/2025 at 3:54 PM

## 2025-07-21 NOTE — PROGRESS NOTES
Pt assisted to the restroom and ambulated without problems.  Pt states her pain \"is better but still there\".  Pt does not want a prescription for pain medication at this time. Pt is scheduled for a follow-up appointment tomorrow with her doctor. Bilateral back dressings are clean, dry, and intact.  Pt meets discharge criteria. Discharge instructions given to pt and pt verbalized understanding.  IV removed.  Pt d/c'd home to .

## 2025-07-22 ENCOUNTER — HOSPITAL ENCOUNTER (OUTPATIENT)
Dept: PHYSICAL THERAPY | Age: 50
Setting detail: THERAPIES SERIES
Discharge: HOME | End: 2025-07-22
Payer: COMMERCIAL

## 2025-07-22 ENCOUNTER — TELEPHONE (OUTPATIENT)
Dept: ORTHOPEDIC SURGERY | Age: 50
End: 2025-07-22

## 2025-07-22 PROCEDURE — 97110 THERAPEUTIC EXERCISES: CPT

## 2025-07-22 PROCEDURE — 97530 THERAPEUTIC ACTIVITIES: CPT

## 2025-07-22 NOTE — FLOWSHEET NOTE
[facility] - Outpatient Rehabilitation and Therapy: [address], [place] [address] office: [phone] fax: [phone]       Physical Therapy: TREATMENT/PROGRESS NOTE   Patient: Subha Buckner (50 y.o. female)   Examination Date: 2025   :  1975 MRN: [id number]   Visit #: 8   Insurance Allowable Auth Needed   [place], ded met; 85/15; estimates provided (~$26/visit) []Yes    [x]No    Insurance: Payor: [facility] / Plan: [employer] EMPLOYEES / Product Type: *No Product type* /   Insurance ID: [id number] - (Commercial)  Secondary Insurance (if applicable):    Treatment Diagnosis:     ICD-10-CM    1. Right knee pain, unspecified chronicity  M25.561       2. Weakness  R53.1          Medical Diagnosis:  Acute medial meniscus tear of right knee, initial encounter [S83.241A]  Closed osteochondral fracture of distal end of right femur, initial encounter ([facility]) [S72.491A]   Referring Physician: Simba Hutchins MD  PCP: Prasanth Quick MD     Plan of care signed (Y/N): pending    Date of Patient follow up with Physician: 25     Plan of Care Report: NO  POC update due: (10 visits /OR AUTH LIMITS, whichever is less)  2025                                             Medical History:  Comorbidities:  Other: Liden factor V, Hashimoto's; depression, anxiety  Relevant Medical History: L ACL Reconstruction with subsequent CRPS                                          Precautions/ Contra-indications:           Latex allergy:  NO  Pacemaker:    NO  Contraindications for Manipulation: None  Date of Surgery: N/A; injured in 2025  Other:    Red Flags:  None    Suicide Screening:   The patient did not verbalize a primary behavioral concern, suicidal ideation, suicidal intent, or demonstrate suicidal behaviors.    Preferred Language for Healthcare:   [x] English       [] other:    SUBJECTIVE EXAMINATION     Patient stated complaint:  Pt presents to OP PT 1 day s/p SNB. Pt continues to

## 2025-07-29 ENCOUNTER — HOSPITAL ENCOUNTER (OUTPATIENT)
Dept: PHYSICAL THERAPY | Age: 50
Setting detail: THERAPIES SERIES
Discharge: HOME | End: 2025-07-29
Payer: COMMERCIAL

## 2025-07-29 PROCEDURE — 97530 THERAPEUTIC ACTIVITIES: CPT

## 2025-07-29 PROCEDURE — 97110 THERAPEUTIC EXERCISES: CPT

## 2025-07-29 PROCEDURE — 97112 NEUROMUSCULAR REEDUCATION: CPT

## 2025-07-29 NOTE — FLOWSHEET NOTE
[facility] - Outpatient Rehabilitation and Therapy: [address], [place] [address] office: [phone] fax: [phone]       Physical Therapy: TREATMENT/PROGRESS NOTE   Patient: Subha Buckner (50 y.o. female)   Examination Date: 2025   :  1975 MRN: [id number]   Visit #: 9   Insurance Allowable Auth Needed   [place], ded met; 85/15; estimates provided (~$26/visit) []Yes    [x]No    Insurance: Payor: [facility] / Plan: [employer] EMPLOYEES / Product Type: *No Product type* /   Insurance ID: [id number] - (Commercial)  Secondary Insurance (if applicable):    Treatment Diagnosis:     ICD-10-CM    1. Right knee pain, unspecified chronicity  M25.561       2. Weakness  R53.1          Medical Diagnosis:  Acute medial meniscus tear of right knee, initial encounter [S83.241A]  Closed osteochondral fracture of distal end of right femur, initial encounter ([facility]) [S72.491A]   Referring Physician: Simba Hutchins MD  PCP: Prasanth Quick MD     Plan of care signed (Y/N): pending    Date of Patient follow up with Physician: 25     Plan of Care Report: NO  POC update due: (10 visits /OR AUTH LIMITS, whichever is less)  2025                                             Medical History:  Comorbidities:  Other: Liden factor V, Hashimoto's; depression, anxiety  Relevant Medical History: L ACL Reconstruction with subsequent CRPS                                          Precautions/ Contra-indications:           Latex allergy:  NO  Pacemaker:    NO  Contraindications for Manipulation: None  Date of Surgery: N/A; injured in 2025  Other:    Red Flags:  None    Suicide Screening:   The patient did not verbalize a primary behavioral concern, suicidal ideation, suicidal intent, or demonstrate suicidal behaviors.    Preferred Language for Healthcare:   [x] English       [] other:    SUBJECTIVE EXAMINATION     Patient stated complaint:  Pt presents to OP PT 8-   day s/p SNB. Pt reports

## 2025-07-30 ENCOUNTER — TELEPHONE (OUTPATIENT)
Dept: UROGYNECOLOGY | Age: 50
End: 2025-07-30

## 2025-07-30 NOTE — TELEPHONE ENCOUNTER
Pt is calling to request a refill of her vaginal valium Rx, pt states that symptoms have gotten worse and she would like to begin using this medication again.

## 2025-07-30 NOTE — TELEPHONE ENCOUNTER
Per last visit note with Saundra Harris NP -   \"Vulvodynia:  Stable  Continues gabapentin and Elavil through PCP  Vaginal Valium:  refills not needed today.  She will call when refill order needs faxed to [facility]   F/U 6 months \"     Patient already scheduled for 6 month follow up in November. Refills sent per provider to [facility] pharmacy at this time. Called and made patient aware. Denies any questions or concerns.

## 2025-08-04 ENCOUNTER — TELEPHONE (OUTPATIENT)
Dept: ORTHOPEDIC SURGERY | Age: 50
End: 2025-08-04

## 2025-08-05 ENCOUNTER — OFFICE VISIT (OUTPATIENT)
Dept: ORTHOPEDIC SURGERY | Age: 50
End: 2025-08-05
Payer: COMMERCIAL

## 2025-08-05 ENCOUNTER — TELEPHONE (OUTPATIENT)
Dept: ORTHOPEDIC SURGERY | Age: 50
End: 2025-08-05

## 2025-08-05 ENCOUNTER — HOSPITAL ENCOUNTER (OUTPATIENT)
Dept: PHYSICAL THERAPY | Age: 50
Setting detail: THERAPIES SERIES
Discharge: HOME | End: 2025-08-05
Payer: COMMERCIAL

## 2025-08-05 DIAGNOSIS — G89.29 CHRONIC PAIN OF LEFT KNEE: ICD-10-CM

## 2025-08-05 DIAGNOSIS — M17.12 PRIMARY OSTEOARTHRITIS OF LEFT KNEE: ICD-10-CM

## 2025-08-05 DIAGNOSIS — M76.51 PATELLAR TENDINITIS OF RIGHT KNEE: ICD-10-CM

## 2025-08-05 DIAGNOSIS — E88.89: ICD-10-CM

## 2025-08-05 DIAGNOSIS — G57.73 COMPLEX REGIONAL PAIN SYNDROME TYPE 2 OF BOTH LOWER EXTREMITIES: Primary | ICD-10-CM

## 2025-08-05 DIAGNOSIS — M25.562 CHRONIC PAIN OF LEFT KNEE: ICD-10-CM

## 2025-08-05 PROCEDURE — 97110 THERAPEUTIC EXERCISES: CPT

## 2025-08-05 PROCEDURE — 97112 NEUROMUSCULAR REEDUCATION: CPT

## 2025-08-05 PROCEDURE — 99214 OFFICE O/P EST MOD 30 MIN: CPT | Performed by: PHYSICIAN ASSISTANT

## 2025-08-05 PROCEDURE — 97530 THERAPEUTIC ACTIVITIES: CPT

## 2025-08-05 RX ORDER — PREDNISONE 2.5 MG/1
TABLET ORAL
Qty: 49 TABLET | Refills: 0 | Status: SHIPPED | OUTPATIENT
Start: 2025-08-05 | End: 2025-09-02

## 2025-08-12 ENCOUNTER — HOSPITAL ENCOUNTER (OUTPATIENT)
Dept: PHYSICAL THERAPY | Age: 50
Setting detail: THERAPIES SERIES
Discharge: HOME | End: 2025-08-12
Payer: COMMERCIAL

## 2025-08-12 PROCEDURE — 97110 THERAPEUTIC EXERCISES: CPT | Performed by: PHYSICAL THERAPIST

## 2025-08-12 PROCEDURE — 97530 THERAPEUTIC ACTIVITIES: CPT | Performed by: PHYSICAL THERAPIST

## 2025-08-12 PROCEDURE — 97112 NEUROMUSCULAR REEDUCATION: CPT | Performed by: PHYSICAL THERAPIST

## 2025-08-13 DIAGNOSIS — G57.73 COMPLEX REGIONAL PAIN SYNDROME TYPE 2 OF BOTH LOWER EXTREMITIES: ICD-10-CM

## 2025-08-13 DIAGNOSIS — M17.12 PRIMARY OSTEOARTHRITIS OF LEFT KNEE: Primary | ICD-10-CM

## 2025-08-18 DIAGNOSIS — G57.72 COMPLEX REGIONAL PAIN SYNDROME TYPE 2 OF LEFT LOWER EXTREMITY: ICD-10-CM

## 2025-08-18 DIAGNOSIS — G57.73 COMPLEX REGIONAL PAIN SYNDROME TYPE 2 OF BOTH LOWER EXTREMITIES: Primary | ICD-10-CM

## 2025-08-18 DIAGNOSIS — M17.12 PRIMARY OSTEOARTHRITIS OF LEFT KNEE: ICD-10-CM

## 2025-08-18 DIAGNOSIS — G89.29 CHRONIC PAIN OF LEFT KNEE: ICD-10-CM

## 2025-08-18 DIAGNOSIS — M25.562 CHRONIC PAIN OF LEFT KNEE: ICD-10-CM

## 2025-08-19 ENCOUNTER — TELEPHONE (OUTPATIENT)
Dept: ORTHOPEDIC SURGERY | Age: 50
End: 2025-08-19

## 2025-08-19 ENCOUNTER — HOSPITAL ENCOUNTER (OUTPATIENT)
Age: 50
Discharge: HOME | End: 2025-08-19
Payer: COMMERCIAL

## 2025-08-19 ENCOUNTER — HOSPITAL ENCOUNTER (OUTPATIENT)
Age: 50
Discharge: HOME | End: 2025-08-21
Payer: COMMERCIAL

## 2025-08-19 ENCOUNTER — HOSPITAL ENCOUNTER (OUTPATIENT)
Dept: PHYSICAL THERAPY | Age: 50
Setting detail: THERAPIES SERIES
Discharge: HOME | End: 2025-08-19
Payer: COMMERCIAL

## 2025-08-19 VITALS
TEMPERATURE: 98.3 F | RESPIRATION RATE: 11 BRPM | HEART RATE: 71 BPM | SYSTOLIC BLOOD PRESSURE: 117 MMHG | OXYGEN SATURATION: 100 % | DIASTOLIC BLOOD PRESSURE: 75 MMHG

## 2025-08-19 DIAGNOSIS — M25.561 ACUTE PAIN OF RIGHT KNEE: ICD-10-CM

## 2025-08-19 DIAGNOSIS — S83.241A ACUTE MEDIAL MENISCUS TEAR OF RIGHT KNEE, INITIAL ENCOUNTER: ICD-10-CM

## 2025-08-19 DIAGNOSIS — M25.562 CHRONIC PAIN OF LEFT KNEE: ICD-10-CM

## 2025-08-19 DIAGNOSIS — G57.72 COMPLEX REGIONAL PAIN SYNDROME TYPE 2 OF LEFT LOWER EXTREMITY: ICD-10-CM

## 2025-08-19 DIAGNOSIS — M17.12 PRIMARY OSTEOARTHRITIS OF LEFT KNEE: ICD-10-CM

## 2025-08-19 DIAGNOSIS — G57.73 COMPLEX REGIONAL PAIN SYNDROME TYPE 2 OF BOTH LOWER EXTREMITIES: ICD-10-CM

## 2025-08-19 DIAGNOSIS — G89.29 CHRONIC PAIN OF LEFT KNEE: ICD-10-CM

## 2025-08-19 PROCEDURE — 99152 MOD SED SAME PHYS/QHP 5/>YRS: CPT

## 2025-08-19 PROCEDURE — 6360000004 HC RX CONTRAST MEDICATION: Performed by: RADIOLOGY

## 2025-08-19 PROCEDURE — 2580000003 HC RX 258: Performed by: RADIOLOGY

## 2025-08-19 PROCEDURE — 97110 THERAPEUTIC EXERCISES: CPT | Performed by: PHYSICAL THERAPIST

## 2025-08-19 PROCEDURE — 7100000010 HC PHASE II RECOVERY - FIRST 15 MIN

## 2025-08-19 PROCEDURE — 7100000011 HC PHASE II RECOVERY - ADDTL 15 MIN

## 2025-08-19 PROCEDURE — 97530 THERAPEUTIC ACTIVITIES: CPT | Performed by: PHYSICAL THERAPIST

## 2025-08-19 PROCEDURE — 6360000002 HC RX W HCPCS: Performed by: RADIOLOGY

## 2025-08-19 PROCEDURE — 2500000003 HC RX 250 WO HCPCS: Performed by: RADIOLOGY

## 2025-08-19 PROCEDURE — 97112 NEUROMUSCULAR REEDUCATION: CPT | Performed by: PHYSICAL THERAPIST

## 2025-08-19 RX ORDER — 0.9 % SODIUM CHLORIDE 0.9 %
INTRAVENOUS SOLUTION INTRAVENOUS CONTINUOUS PRN
Status: COMPLETED | OUTPATIENT
Start: 2025-08-19 | End: 2025-08-19

## 2025-08-19 RX ORDER — BUPIVACAINE HYDROCHLORIDE 5 MG/ML
INJECTION, SOLUTION EPIDURAL; INTRACAUDAL; PERINEURAL PRN
Status: COMPLETED | OUTPATIENT
Start: 2025-08-19 | End: 2025-08-19

## 2025-08-19 RX ORDER — SODIUM CHLORIDE 9 MG/ML
INJECTION, SOLUTION INTRAMUSCULAR; INTRAVENOUS; SUBCUTANEOUS PRN
Status: COMPLETED | OUTPATIENT
Start: 2025-08-19 | End: 2025-08-19

## 2025-08-19 RX ORDER — OXYCODONE AND ACETAMINOPHEN 5; 325 MG/1; MG/1
1 TABLET ORAL EVERY 8 HOURS PRN
Refills: 0 | Status: DISCONTINUED | OUTPATIENT
Start: 2025-08-19 | End: 2025-08-20 | Stop reason: HOSPADM

## 2025-08-19 RX ORDER — FENTANYL CITRATE 50 UG/ML
INJECTION, SOLUTION INTRAMUSCULAR; INTRAVENOUS PRN
Status: COMPLETED | OUTPATIENT
Start: 2025-08-19 | End: 2025-08-19

## 2025-08-19 RX ORDER — BUPIVACAINE HYDROCHLORIDE 2.5 MG/ML
INJECTION, SOLUTION EPIDURAL; INFILTRATION; INTRACAUDAL; PERINEURAL PRN
Status: COMPLETED | OUTPATIENT
Start: 2025-08-19 | End: 2025-08-19

## 2025-08-19 RX ORDER — IOPAMIDOL 755 MG/ML
INJECTION, SOLUTION INTRAVASCULAR PRN
Status: COMPLETED | OUTPATIENT
Start: 2025-08-19 | End: 2025-08-19

## 2025-08-19 RX ORDER — LIDOCAINE HYDROCHLORIDE 10 MG/ML
INJECTION, SOLUTION INFILTRATION; PERINEURAL PRN
Status: COMPLETED | OUTPATIENT
Start: 2025-08-19 | End: 2025-08-19

## 2025-08-19 RX ORDER — FENTANYL CITRATE 50 UG/ML
50 INJECTION, SOLUTION INTRAMUSCULAR; INTRAVENOUS ONCE
Status: COMPLETED | OUTPATIENT
Start: 2025-08-19 | End: 2025-08-19

## 2025-08-19 RX ORDER — KETOROLAC TROMETHAMINE 30 MG/ML
30 INJECTION, SOLUTION INTRAMUSCULAR; INTRAVENOUS ONCE
Status: COMPLETED | OUTPATIENT
Start: 2025-08-19 | End: 2025-08-19

## 2025-08-19 RX ORDER — OXYCODONE AND ACETAMINOPHEN 5; 325 MG/1; MG/1
2 TABLET ORAL EVERY 8 HOURS PRN
Refills: 0 | Status: DISCONTINUED | OUTPATIENT
Start: 2025-08-19 | End: 2025-08-19 | Stop reason: SDUPTHER

## 2025-08-19 RX ORDER — OXYCODONE AND ACETAMINOPHEN 10; 325 MG/1; MG/1
1 TABLET ORAL EVERY 8 HOURS PRN
Refills: 0 | Status: DISCONTINUED | OUTPATIENT
Start: 2025-08-19 | End: 2025-08-20 | Stop reason: HOSPADM

## 2025-08-19 RX ORDER — MIDAZOLAM HYDROCHLORIDE 1 MG/ML
INJECTION, SOLUTION INTRAMUSCULAR; INTRAVENOUS PRN
Status: COMPLETED | OUTPATIENT
Start: 2025-08-19 | End: 2025-08-19

## 2025-08-19 RX ADMIN — FENTANYL CITRATE 25 MCG: 50 INJECTION, SOLUTION INTRAMUSCULAR; INTRAVENOUS at 15:29

## 2025-08-19 RX ADMIN — MIDAZOLAM HYDROCHLORIDE 1 MG: 1 INJECTION, SOLUTION INTRAMUSCULAR; INTRAVENOUS at 15:21

## 2025-08-19 RX ADMIN — SODIUM CHLORIDE 1 ML: 9 INJECTION INTRAMUSCULAR; INTRAVENOUS; SUBCUTANEOUS at 15:32

## 2025-08-19 RX ADMIN — SODIUM CHLORIDE 750 ML: 9 INJECTION, SOLUTION INTRAVENOUS at 15:39

## 2025-08-19 RX ADMIN — FENTANYL CITRATE 50 MCG: 50 INJECTION INTRAMUSCULAR; INTRAVENOUS at 16:08

## 2025-08-19 RX ADMIN — MIDAZOLAM HYDROCHLORIDE 1 MG: 1 INJECTION, SOLUTION INTRAMUSCULAR; INTRAVENOUS at 15:30

## 2025-08-19 RX ADMIN — IOPAMIDOL 2 ML: 755 INJECTION, SOLUTION INTRAVENOUS at 15:32

## 2025-08-19 RX ADMIN — LIDOCAINE HYDROCHLORIDE 8 ML: 10 INJECTION, SOLUTION INFILTRATION; PERINEURAL at 15:22

## 2025-08-19 RX ADMIN — BUPIVACAINE HYDROCHLORIDE 28 ML: 2.5 INJECTION, SOLUTION EPIDURAL; INFILTRATION; INTRACAUDAL; PERINEURAL at 15:33

## 2025-08-19 RX ADMIN — BUPIVACAINE HYDROCHLORIDE 2 ML: 5 INJECTION, SOLUTION EPIDURAL; INTRACAUDAL; PERINEURAL at 15:29

## 2025-08-19 RX ADMIN — KETOROLAC TROMETHAMINE 30 MG: 30 INJECTION, SOLUTION INTRAMUSCULAR at 16:11

## 2025-08-19 RX ADMIN — FENTANYL CITRATE 50 MCG: 50 INJECTION, SOLUTION INTRAMUSCULAR; INTRAVENOUS at 15:21

## 2025-08-19 ASSESSMENT — PAIN SCALES - GENERAL
PAINLEVEL_OUTOF10: 7
PAINLEVEL_OUTOF10: 0
PAINLEVEL_OUTOF10: 7
PAINLEVEL_OUTOF10: 0

## 2025-08-19 ASSESSMENT — PAIN DESCRIPTION - PAIN TYPE: TYPE: ACUTE PAIN;CHRONIC PAIN

## 2025-08-19 ASSESSMENT — PAIN DESCRIPTION - LOCATION: LOCATION: BACK;GROIN

## 2025-08-19 ASSESSMENT — PAIN DESCRIPTION - FREQUENCY: FREQUENCY: CONTINUOUS

## 2025-08-19 ASSESSMENT — PAIN DESCRIPTION - ORIENTATION: ORIENTATION: RIGHT;LEFT;LOWER

## 2025-08-19 ASSESSMENT — PAIN DESCRIPTION - DESCRIPTORS: DESCRIPTORS: SHARP

## 2025-08-26 ENCOUNTER — HOSPITAL ENCOUNTER (OUTPATIENT)
Dept: PHYSICAL THERAPY | Age: 50
Setting detail: THERAPIES SERIES
Discharge: HOME | End: 2025-08-26
Payer: COMMERCIAL

## 2025-08-26 PROCEDURE — 97110 THERAPEUTIC EXERCISES: CPT

## 2025-08-26 PROCEDURE — 97112 NEUROMUSCULAR REEDUCATION: CPT

## 2025-08-26 PROCEDURE — 97530 THERAPEUTIC ACTIVITIES: CPT

## 2025-08-29 ENCOUNTER — TELEPHONE (OUTPATIENT)
Dept: ORTHOPEDIC SURGERY | Age: 50
End: 2025-08-29

## 2025-09-02 ENCOUNTER — HOSPITAL ENCOUNTER (OUTPATIENT)
Dept: PHYSICAL THERAPY | Age: 50
Setting detail: THERAPIES SERIES
Discharge: HOME | End: 2025-09-02
Payer: COMMERCIAL

## 2025-09-02 ENCOUNTER — TELEPHONE (OUTPATIENT)
Dept: ORTHOPEDIC SURGERY | Age: 50
End: 2025-09-02

## 2025-09-02 DIAGNOSIS — M76.51 PATELLAR TENDINITIS OF RIGHT KNEE: ICD-10-CM

## 2025-09-02 DIAGNOSIS — G57.73 COMPLEX REGIONAL PAIN SYNDROME TYPE 2 OF BOTH LOWER EXTREMITIES: ICD-10-CM

## 2025-09-02 DIAGNOSIS — M17.12 PRIMARY OSTEOARTHRITIS OF LEFT KNEE: Primary | ICD-10-CM

## 2025-09-02 DIAGNOSIS — G89.29 CHRONIC PAIN OF LEFT KNEE: ICD-10-CM

## 2025-09-02 DIAGNOSIS — M25.562 CHRONIC PAIN OF LEFT KNEE: ICD-10-CM

## 2025-09-02 PROCEDURE — 97530 THERAPEUTIC ACTIVITIES: CPT | Performed by: PHYSICAL THERAPIST

## 2025-09-02 PROCEDURE — 97110 THERAPEUTIC EXERCISES: CPT | Performed by: PHYSICAL THERAPIST

## 2025-09-02 PROCEDURE — 97112 NEUROMUSCULAR REEDUCATION: CPT | Performed by: PHYSICAL THERAPIST

## 2025-09-03 ENCOUNTER — TELEPHONE (OUTPATIENT)
Dept: ORTHOPEDIC SURGERY | Age: 50
End: 2025-09-03

## (undated) DEVICE — LIGHT HANDLE: Brand: DEVON

## (undated) DEVICE — COVER,MAYO STAND,STERILE: Brand: MEDLINE

## (undated) DEVICE — Device

## (undated) DEVICE — SPONGE LAP W18XL18IN WHT COT 4 PLY FLD STRUNG RADPQ DISP ST

## (undated) DEVICE — SET VLV 3 PC AWS DISPOSABLE GRDIAN SCOPEVALET

## (undated) DEVICE — 2.4 MM FLEXIBLE PASSING PINS,                                    STERILE 5 PER PACKAGE: Brand: ENDOBUTTON

## (undated) DEVICE — DRILL BIT 2.0MM (5/64'') X 128.0MM

## (undated) DEVICE — MOUTHPIECE ENDOSCP L CTRL OPN AND SIDE PORTS DISP

## (undated) DEVICE — SOLUTION IV IRRIG WATER 500ML POUR BRL ST 2F7113

## (undated) DEVICE — PROCEDURE KIT ENDOSCP CUST

## (undated) DEVICE — PEN: MARKING STD 100/CS: Brand: MEDICAL ACTION INDUSTRIES

## (undated) DEVICE — 2.4 MM X 12 INCH DRILL-TIP GUIDE WIRE

## (undated) DEVICE — SUTURE PDS II SZ 0 L60IN ABSRB VLT L48MM CTX 1/2 CIR Z990G

## (undated) DEVICE — AIR/WATER CLEANING ADAPTER FOR OLYMPUS® GI ENDOSCOPE: Brand: BULLDOG®

## (undated) DEVICE — SUTURE FIBERWIRE SZ 2 W/ TAPERED NEEDLE BLUE L38IN NONABSORB BLU L26.5MM 1/2 CIRCLE AR7200

## (undated) DEVICE — SOLUTION IRRIG 500ML STRL H2O NONPYROGENIC

## (undated) DEVICE — SINGLE USE AIR/WATER, SUCTION AND BIOPSY VALVES SET: Brand: ORCAPOD™

## (undated) DEVICE — KNIFE SURG 10MM GRFT DISP FOR ACL RECON

## (undated) DEVICE — BASIC SINGLE BASIN 1-LF: Brand: MEDLINE INDUSTRIES, INC.

## (undated) DEVICE — MICRO SAGITTAL BLADE (9.5 X 0.4 X 25.5MM)

## (undated) DEVICE — Z INACTIVE USE 2660664 SOLUTION IRRIG 3000ML 0.9% SOD CHL USP UROMATIC PLAS CONT

## (undated) DEVICE — GUIDE WIRE 1.2 MM X 12 INCH. BOX                                    OF 5, STERILE: Brand: BIOSURE

## (undated) DEVICE — TUBING IRRIG COMPATIBLE W ERBE MEDIVATOR PMP HYDR

## (undated) DEVICE — SHEET,DRAPE,53X77,STERILE: Brand: MEDLINE

## (undated) DEVICE — CAP WATER BTL AIR TBNG L 16 IN CO2 TBNG L 48 IN ENDOSCP

## (undated) DEVICE — 3M™ STERI-DRAPE™ ARTHROSCOPY SHEET WITH POUCH 1194: Brand: STERI-DRAPE™

## (undated) DEVICE — SUTURE MCRYL SZ 4-0 L27IN ABSRB UD L19MM PS-2 1/2 CIR PRIM Y426H

## (undated) DEVICE — SUTURE VCRL SZ 1 L18IN ABSRB UD L36MM CT-1 1/2 CIR J841D

## (undated) DEVICE — TRAP SPEC RETRV CLR PLAS POLYP IN LN SUCT QUIK CTCH

## (undated) DEVICE — INTENDED FOR TISSUE SEPARATION, AND OTHER PROCEDURES THAT REQUIRE A SHARP SURGICAL BLADE TO PUNCTURE OR CUT.: Brand: BARD-PARKER ® STAINLESS STEEL BLADES

## (undated) DEVICE — DEVON TUBE HOLDER REMOVABLE TOUCH FASTEN STRAP: Brand: DEVON

## (undated) DEVICE — ULTRABRAID 2 COBRAID 38 LENGTH SINGL: Brand: ULTRABRAID

## (undated) DEVICE — CHLORAPREP 26ML ORANGE

## (undated) DEVICE — PENCIL ES L3M BTTN SWCH S STL HEX LOK BLDE ELECTRD HOLSTER

## (undated) DEVICE — SUTURE VCRL SZ 2-0 L27IN ABSRB UD L26MM SH 1/2 CIR J417H

## (undated) DEVICE — MASC TURNOVER KIT: Brand: MEDLINE INDUSTRIES, INC.

## (undated) DEVICE — SUTURE VCRL SZ 0 L36IN ABSRB UD L36MM CT-1 1/2 CIR J946H

## (undated) DEVICE — BW-412T DISP COMBO CLEANING BRUSH: Brand: SINGLE USE COMBINATION CLEANING BRUSH

## (undated) DEVICE — FMS GRAVITY TUBING: Brand: FMS

## (undated) DEVICE — GOWN,AURORA,NONREINF,RAGLAN,XXL,STERILE: Brand: MEDLINE

## (undated) DEVICE — FAST-FIX 360 STRAIGHT KNOT                                    PUSHER/CUTTER AND SLOTTED CANNULA SETS: Brand: FAST-FIX

## (undated) DEVICE — SNARE ENDOSCP L240CM SHTH DIA24MM LOOP W10MM POLYP RND REINF

## (undated) DEVICE — GLOVE ORANGE PI 8 1/2   MSG9085

## (undated) DEVICE — ENDOSCOPIC KIT 6X3/16 FT COLON W/ 1.1 OZ 2 GWN W/O BRSH

## (undated) DEVICE — 60 ML SYRINGE,REGULAR TIP: Brand: MONOJECT

## (undated) DEVICE — FORCEPS BX L240CM WRK CHN 2.8MM STD CAP W/ NDL MIC MESH

## (undated) DEVICE — 3M™ STERI-STRIP™ REINFORCED ADHESIVE SKIN CLOSURES, R1547, 1/2 IN X 4 IN (12 MM X 100 MM), 6 STRIPS/ENVELOPE: Brand: 3M™ STERI-STRIP™